# Patient Record
Sex: FEMALE | Race: WHITE | NOT HISPANIC OR LATINO
[De-identification: names, ages, dates, MRNs, and addresses within clinical notes are randomized per-mention and may not be internally consistent; named-entity substitution may affect disease eponyms.]

---

## 2017-02-03 ENCOUNTER — APPOINTMENT (OUTPATIENT)
Dept: OBGYN | Facility: CLINIC | Age: 22
End: 2017-02-03

## 2017-02-03 VITALS
DIASTOLIC BLOOD PRESSURE: 85 MMHG | HEIGHT: 66 IN | SYSTOLIC BLOOD PRESSURE: 120 MMHG | BODY MASS INDEX: 27.32 KG/M2 | WEIGHT: 170 LBS

## 2017-02-06 LAB
BACTERIA GENITAL AEROBE CULT: ABNORMAL
C TRACH DNA SPEC QL NAA+PROBE: NORMAL
C TRACH RRNA SPEC QL NAA+PROBE: NORMAL
N GONORRHOEA DNA SPEC QL NAA+PROBE: NORMAL
N GONORRHOEA RRNA SPEC QL NAA+PROBE: NORMAL
SOURCE AMPLIFICATION: NORMAL
SOURCE AMPLIFICATION: NORMAL
T VAGINALIS RRNA SPEC QL NAA+PROBE: NEGATIVE

## 2017-03-17 ENCOUNTER — LABORATORY RESULT (OUTPATIENT)
Age: 22
End: 2017-03-17

## 2017-03-17 ENCOUNTER — RESULT CHARGE (OUTPATIENT)
Age: 22
End: 2017-03-17

## 2017-03-17 ENCOUNTER — APPOINTMENT (OUTPATIENT)
Dept: OBGYN | Facility: CLINIC | Age: 22
End: 2017-03-17

## 2017-03-17 VITALS — SYSTOLIC BLOOD PRESSURE: 120 MMHG | DIASTOLIC BLOOD PRESSURE: 80 MMHG

## 2017-03-17 LAB
BILIRUB UR QL STRIP: NORMAL
GLUCOSE UR-MCNC: NORMAL
HCG UR QL: 1 EU/DL
HGB UR QL STRIP.AUTO: NORMAL
KETONES UR-MCNC: NORMAL
LEUKOCYTE ESTERASE UR QL STRIP: NORMAL
NITRITE UR QL STRIP: NORMAL
PH UR STRIP: 7
PROT UR STRIP-MCNC: NORMAL
SP GR UR STRIP: 1.02

## 2017-03-18 LAB
APPEARANCE: ABNORMAL
BILIRUBIN URINE: ABNORMAL
BLOOD URINE: NEGATIVE
COLOR: ABNORMAL
GLUCOSE QUALITATIVE U: NORMAL MG/DL
KETONES URINE: NEGATIVE
LEUKOCYTE ESTERASE URINE: ABNORMAL
NITRITE URINE: POSITIVE
PH URINE: 6.5
PROTEIN URINE: NEGATIVE MG/DL
SPECIFIC GRAVITY URINE: 1.03
UROBILINOGEN URINE: 1 MG/DL

## 2017-03-20 LAB
BACTERIA GENITAL AEROBE CULT: ABNORMAL
BACTERIA UR CULT: ABNORMAL

## 2017-07-27 ENCOUNTER — RX RENEWAL (OUTPATIENT)
Age: 22
End: 2017-07-27

## 2017-07-27 ENCOUNTER — MEDICATION RENEWAL (OUTPATIENT)
Age: 22
End: 2017-07-27

## 2017-08-21 ENCOUNTER — APPOINTMENT (OUTPATIENT)
Dept: OBGYN | Facility: CLINIC | Age: 22
End: 2017-08-21
Payer: MEDICAID

## 2017-08-21 VITALS
WEIGHT: 170 LBS | BODY MASS INDEX: 27.32 KG/M2 | DIASTOLIC BLOOD PRESSURE: 60 MMHG | HEIGHT: 66 IN | SYSTOLIC BLOOD PRESSURE: 110 MMHG

## 2017-08-21 PROCEDURE — 99395 PREV VISIT EST AGE 18-39: CPT

## 2017-08-24 LAB
C TRACH RRNA SPEC QL NAA+PROBE: NORMAL
CYTOLOGY CVX/VAG DOC THIN PREP: NORMAL
N GONORRHOEA RRNA SPEC QL NAA+PROBE: NORMAL
SOURCE TP AMPLIFICATION: NORMAL

## 2017-08-29 ENCOUNTER — MEDICATION RENEWAL (OUTPATIENT)
Age: 22
End: 2017-08-29

## 2017-09-14 ENCOUNTER — MEDICATION RENEWAL (OUTPATIENT)
Age: 22
End: 2017-09-14

## 2018-02-09 ENCOUNTER — APPOINTMENT (OUTPATIENT)
Dept: OBGYN | Facility: CLINIC | Age: 23
End: 2018-02-09
Payer: MEDICAID

## 2018-02-09 VITALS
BODY MASS INDEX: 27.62 KG/M2 | DIASTOLIC BLOOD PRESSURE: 70 MMHG | SYSTOLIC BLOOD PRESSURE: 110 MMHG | WEIGHT: 176 LBS | HEIGHT: 67 IN

## 2018-02-09 PROCEDURE — 99213 OFFICE O/P EST LOW 20 MIN: CPT

## 2018-02-09 RX ORDER — SULFAMETHOXAZOLE AND TRIMETHOPRIM 800; 160 MG/1; MG/1
800-160 TABLET ORAL TWICE DAILY
Qty: 14 | Refills: 0 | Status: COMPLETED | COMMUNITY
Start: 2017-09-14 | End: 2018-02-09

## 2018-02-09 RX ORDER — VENLAFAXINE HYDROCHLORIDE 37.5 MG/1
37.5 CAPSULE, EXTENDED RELEASE ORAL
Qty: 30 | Refills: 0 | Status: COMPLETED | COMMUNITY
Start: 2017-08-21

## 2018-02-09 RX ORDER — TOPIRAMATE 25 MG/1
25 TABLET, FILM COATED ORAL
Qty: 120 | Refills: 0 | Status: COMPLETED | COMMUNITY
Start: 2017-08-21

## 2018-02-09 RX ORDER — METRONIDAZOLE 7.5 MG/G
0.75 GEL VAGINAL
Qty: 1 | Refills: 0 | Status: COMPLETED | COMMUNITY
Start: 2017-02-03 | End: 2018-02-09

## 2018-02-09 RX ORDER — VENLAFAXINE HYDROCHLORIDE 150 MG/1
150 CAPSULE, EXTENDED RELEASE ORAL
Qty: 30 | Refills: 0 | Status: COMPLETED | COMMUNITY
Start: 2017-10-16

## 2018-02-09 RX ORDER — METHYLPHENIDATE HYDROCHLORIDE 27 MG/1
27 TABLET, EXTENDED RELEASE ORAL
Qty: 30 | Refills: 0 | Status: COMPLETED | COMMUNITY
Start: 2017-08-21

## 2018-02-09 RX ORDER — METHYLPHENIDATE HYDROCHLORIDE 20 MG/1
20 TABLET ORAL
Qty: 60 | Refills: 0 | Status: COMPLETED | COMMUNITY
Start: 2017-08-21

## 2018-02-09 RX ORDER — CLINDAMYCIN PHOSPHATE 20 MG/G
2 CREAM VAGINAL
Qty: 1 | Refills: 0 | Status: COMPLETED | COMMUNITY
Start: 2017-03-17 | End: 2018-02-09

## 2018-02-12 ENCOUNTER — RX RENEWAL (OUTPATIENT)
Age: 23
End: 2018-02-12

## 2018-07-25 ENCOUNTER — RX RENEWAL (OUTPATIENT)
Age: 23
End: 2018-07-25

## 2018-08-09 ENCOUNTER — MEDICATION RENEWAL (OUTPATIENT)
Age: 23
End: 2018-08-09

## 2018-08-27 ENCOUNTER — LABORATORY RESULT (OUTPATIENT)
Age: 23
End: 2018-08-27

## 2018-08-27 ENCOUNTER — APPOINTMENT (OUTPATIENT)
Dept: OBGYN | Facility: CLINIC | Age: 23
End: 2018-08-27
Payer: MEDICAID

## 2018-08-27 VITALS
HEIGHT: 67 IN | SYSTOLIC BLOOD PRESSURE: 130 MMHG | DIASTOLIC BLOOD PRESSURE: 70 MMHG | WEIGHT: 172 LBS | BODY MASS INDEX: 27 KG/M2

## 2018-08-27 PROCEDURE — 99395 PREV VISIT EST AGE 18-39: CPT

## 2018-08-28 LAB
C TRACH RRNA SPEC QL NAA+PROBE: NOT DETECTED
N GONORRHOEA RRNA SPEC QL NAA+PROBE: NOT DETECTED
SOURCE TP AMPLIFICATION: NORMAL

## 2018-09-06 LAB — CYTOLOGY CVX/VAG DOC THIN PREP: NORMAL

## 2018-11-13 ENCOUNTER — RX RENEWAL (OUTPATIENT)
Age: 23
End: 2018-11-13

## 2018-12-18 ENCOUNTER — APPOINTMENT (OUTPATIENT)
Dept: OBGYN | Facility: CLINIC | Age: 23
End: 2018-12-18
Payer: MEDICAID

## 2018-12-18 VITALS — DIASTOLIC BLOOD PRESSURE: 80 MMHG | SYSTOLIC BLOOD PRESSURE: 110 MMHG

## 2018-12-18 PROCEDURE — 99213 OFFICE O/P EST LOW 20 MIN: CPT

## 2018-12-19 ENCOUNTER — TRANSCRIPTION ENCOUNTER (OUTPATIENT)
Age: 23
End: 2018-12-19

## 2019-01-23 ENCOUNTER — MEDICATION RENEWAL (OUTPATIENT)
Age: 24
End: 2019-01-23

## 2019-02-01 ENCOUNTER — APPOINTMENT (OUTPATIENT)
Dept: OBGYN | Facility: CLINIC | Age: 24
End: 2019-02-01
Payer: MEDICAID

## 2019-02-01 VITALS
BODY MASS INDEX: 27.32 KG/M2 | HEIGHT: 66 IN | WEIGHT: 170 LBS | SYSTOLIC BLOOD PRESSURE: 100 MMHG | DIASTOLIC BLOOD PRESSURE: 60 MMHG

## 2019-02-01 PROCEDURE — 99213 OFFICE O/P EST LOW 20 MIN: CPT

## 2019-02-01 RX ORDER — CEPHALEXIN 500 MG/1
500 CAPSULE ORAL
Qty: 14 | Refills: 0 | Status: COMPLETED | COMMUNITY
Start: 2018-12-19

## 2019-02-01 RX ORDER — ESCITALOPRAM OXALATE 10 MG/1
10 TABLET ORAL
Qty: 30 | Refills: 0 | Status: COMPLETED | COMMUNITY
Start: 2018-03-07

## 2019-02-01 RX ORDER — DESVENLAFAXINE 25 MG/1
25 TABLET, EXTENDED RELEASE ORAL
Qty: 30 | Refills: 0 | Status: COMPLETED | COMMUNITY
Start: 2018-10-29

## 2019-02-01 NOTE — HISTORY OF PRESENT ILLNESS
[6 Months Ago] : 6 months ago [Healthy Diet] : a healthy diet [Regular Exercise] : regular exercise [Weight Concerns] : weight concens [Sexually Active] : is sexually active [Monogamous] : is monogamous [Male ___] : [unfilled] male

## 2019-02-18 ENCOUNTER — RX RENEWAL (OUTPATIENT)
Age: 24
End: 2019-02-18

## 2019-04-03 ENCOUNTER — TRANSCRIPTION ENCOUNTER (OUTPATIENT)
Age: 24
End: 2019-04-03

## 2019-06-10 ENCOUNTER — TRANSCRIPTION ENCOUNTER (OUTPATIENT)
Age: 24
End: 2019-06-10

## 2019-07-01 ENCOUNTER — OUTPATIENT (OUTPATIENT)
Dept: OUTPATIENT SERVICES | Facility: HOSPITAL | Age: 24
LOS: 1 days | End: 2019-07-01
Payer: MEDICAID

## 2019-07-01 PROCEDURE — G9001: CPT

## 2019-07-16 DIAGNOSIS — Z71.89 OTHER SPECIFIED COUNSELING: ICD-10-CM

## 2019-08-29 ENCOUNTER — APPOINTMENT (OUTPATIENT)
Dept: OBGYN | Facility: CLINIC | Age: 24
End: 2019-08-29
Payer: MEDICAID

## 2019-08-29 VITALS
BODY MASS INDEX: 27.99 KG/M2 | DIASTOLIC BLOOD PRESSURE: 80 MMHG | WEIGHT: 174.17 LBS | SYSTOLIC BLOOD PRESSURE: 120 MMHG | HEIGHT: 66 IN

## 2019-08-29 PROCEDURE — 99395 PREV VISIT EST AGE 18-39: CPT

## 2019-08-29 RX ORDER — VENLAFAXINE HYDROCHLORIDE 75 MG/1
75 CAPSULE, EXTENDED RELEASE ORAL
Qty: 30 | Refills: 0 | Status: DISCONTINUED | COMMUNITY
Start: 2017-08-21 | End: 2019-08-29

## 2019-08-29 RX ORDER — CIPROFLOXACIN 3 MG/ML
0.3 SOLUTION OPHTHALMIC
Qty: 5 | Refills: 0 | Status: COMPLETED | COMMUNITY
Start: 2019-04-03

## 2019-08-29 RX ORDER — CLONAZEPAM 1 MG/1
1 TABLET ORAL
Qty: 60 | Refills: 0 | Status: DISCONTINUED | COMMUNITY
Start: 2017-08-21 | End: 2019-08-29

## 2019-08-29 RX ORDER — TRAZODONE HYDROCHLORIDE 150 MG/1
150 TABLET ORAL
Qty: 30 | Refills: 0 | Status: DISCONTINUED | COMMUNITY
Start: 2018-08-03 | End: 2019-08-29

## 2019-08-29 RX ORDER — LISDEXAMFETAMINE DIMESYLATE 30 MG/1
30 CAPSULE ORAL
Qty: 30 | Refills: 0 | Status: DISCONTINUED | COMMUNITY
Start: 2017-10-16 | End: 2019-08-29

## 2019-08-29 NOTE — PHYSICAL EXAM
[Awake] : awake [Alert] : alert [Acute Distress] : no acute distress [Thyroid Nodule] : no thyroid nodule [LAD] : no lymphadenopathy [Mass] : no breast mass [Goiter] : no goiter [Nipple Discharge] : no nipple discharge [Soft] : soft [Axillary LAD] : no axillary lymphadenopathy [Tender] : non tender [Distended] : not distended [H/Smegaly] : no hepatosplenomegaly [Oriented x3] : oriented to person, place, and time [Labia Majora] : labia major [Labia Minora] : labia minora [Normal] : clitoris [Pap Obtained] : a Pap smear was performed [No Bleeding] : there was no active vaginal bleeding [Uterine Adnexae] : were not tender and not enlarged

## 2019-08-29 NOTE — COUNSELING
[Nutrition] : nutrition [Breast Self Exam] : breast self exam [Vitamins/Supplements] : vitamins/supplements [Exercise] : exercise [Contraception] : contraception

## 2019-10-04 ENCOUNTER — OUTPATIENT (OUTPATIENT)
Dept: OUTPATIENT SERVICES | Facility: HOSPITAL | Age: 24
LOS: 1 days | End: 2019-10-04
Payer: MEDICAID

## 2019-10-04 VITALS
HEIGHT: 66.5 IN | RESPIRATION RATE: 16 BRPM | TEMPERATURE: 98 F | OXYGEN SATURATION: 100 % | WEIGHT: 171.96 LBS | SYSTOLIC BLOOD PRESSURE: 126 MMHG | DIASTOLIC BLOOD PRESSURE: 79 MMHG | HEART RATE: 78 BPM

## 2019-10-04 DIAGNOSIS — K21.9 GASTRO-ESOPHAGEAL REFLUX DISEASE WITHOUT ESOPHAGITIS: ICD-10-CM

## 2019-10-04 DIAGNOSIS — Z90.3 ACQUIRED ABSENCE OF STOMACH [PART OF]: Chronic | ICD-10-CM

## 2019-10-04 DIAGNOSIS — Z98.890 OTHER SPECIFIED POSTPROCEDURAL STATES: Chronic | ICD-10-CM

## 2019-10-04 DIAGNOSIS — E66.01 MORBID (SEVERE) OBESITY DUE TO EXCESS CALORIES: ICD-10-CM

## 2019-10-04 DIAGNOSIS — Z01.818 ENCOUNTER FOR OTHER PREPROCEDURAL EXAMINATION: ICD-10-CM

## 2019-10-04 LAB
ANION GAP SERPL CALC-SCNC: 7 MMOL/L — SIGNIFICANT CHANGE UP (ref 5–17)
APTT BLD: 27.6 SEC — SIGNIFICANT CHANGE UP (ref 27.5–36.3)
BASOPHILS # BLD AUTO: 0.08 K/UL — SIGNIFICANT CHANGE UP (ref 0–0.2)
BASOPHILS NFR BLD AUTO: 1.7 % — SIGNIFICANT CHANGE UP (ref 0–2)
BUN SERPL-MCNC: 16 MG/DL — SIGNIFICANT CHANGE UP (ref 7–23)
CALCIUM SERPL-MCNC: 9.1 MG/DL — SIGNIFICANT CHANGE UP (ref 8.5–10.1)
CHLORIDE SERPL-SCNC: 107 MMOL/L — SIGNIFICANT CHANGE UP (ref 96–108)
CO2 SERPL-SCNC: 27 MMOL/L — SIGNIFICANT CHANGE UP (ref 22–31)
CREAT SERPL-MCNC: 0.88 MG/DL — SIGNIFICANT CHANGE UP (ref 0.5–1.3)
EOSINOPHIL # BLD AUTO: 0.2 K/UL — SIGNIFICANT CHANGE UP (ref 0–0.5)
EOSINOPHIL NFR BLD AUTO: 4.3 % — SIGNIFICANT CHANGE UP (ref 0–6)
GLUCOSE SERPL-MCNC: 81 MG/DL — SIGNIFICANT CHANGE UP (ref 70–99)
HCT VFR BLD CALC: 33.6 % — LOW (ref 34.5–45)
HGB BLD-MCNC: 10.1 G/DL — LOW (ref 11.5–15.5)
IMM GRANULOCYTES NFR BLD AUTO: 0.4 % — SIGNIFICANT CHANGE UP (ref 0–1.5)
INR BLD: 0.86 RATIO — LOW (ref 0.88–1.16)
LYMPHOCYTES # BLD AUTO: 1.67 K/UL — SIGNIFICANT CHANGE UP (ref 1–3.3)
LYMPHOCYTES # BLD AUTO: 36.1 % — SIGNIFICANT CHANGE UP (ref 13–44)
MCHC RBC-ENTMCNC: 22.8 PG — LOW (ref 27–34)
MCHC RBC-ENTMCNC: 30.1 GM/DL — LOW (ref 32–36)
MCV RBC AUTO: 75.8 FL — LOW (ref 80–100)
MONOCYTES # BLD AUTO: 0.55 K/UL — SIGNIFICANT CHANGE UP (ref 0–0.9)
MONOCYTES NFR BLD AUTO: 11.9 % — SIGNIFICANT CHANGE UP (ref 2–14)
NEUTROPHILS # BLD AUTO: 2.1 K/UL — SIGNIFICANT CHANGE UP (ref 1.8–7.4)
NEUTROPHILS NFR BLD AUTO: 45.6 % — SIGNIFICANT CHANGE UP (ref 43–77)
PLATELET # BLD AUTO: 288 K/UL — SIGNIFICANT CHANGE UP (ref 150–400)
POTASSIUM SERPL-MCNC: 4.2 MMOL/L — SIGNIFICANT CHANGE UP (ref 3.5–5.3)
POTASSIUM SERPL-SCNC: 4.2 MMOL/L — SIGNIFICANT CHANGE UP (ref 3.5–5.3)
PROTHROM AB SERPL-ACNC: 9.5 SEC — LOW (ref 10–12.9)
RBC # BLD: 4.43 M/UL — SIGNIFICANT CHANGE UP (ref 3.8–5.2)
RBC # FLD: 16.8 % — HIGH (ref 10.3–14.5)
SODIUM SERPL-SCNC: 141 MMOL/L — SIGNIFICANT CHANGE UP (ref 135–145)
WBC # BLD: 4.62 K/UL — SIGNIFICANT CHANGE UP (ref 3.8–10.5)
WBC # FLD AUTO: 4.62 K/UL — SIGNIFICANT CHANGE UP (ref 3.8–10.5)

## 2019-10-04 PROCEDURE — 85730 THROMBOPLASTIN TIME PARTIAL: CPT

## 2019-10-04 PROCEDURE — 80048 BASIC METABOLIC PNL TOTAL CA: CPT

## 2019-10-04 PROCEDURE — 85610 PROTHROMBIN TIME: CPT

## 2019-10-04 PROCEDURE — G0463: CPT | Mod: 25

## 2019-10-04 PROCEDURE — 85025 COMPLETE CBC W/AUTO DIFF WBC: CPT

## 2019-10-04 PROCEDURE — 36415 COLL VENOUS BLD VENIPUNCTURE: CPT

## 2019-10-04 NOTE — H&P PST ADULT - ASSESSMENT
24 years old female present to PST prior  to upper endoscopy with Dr. Edwards.    Plan  1. Expect a call from Endoscopy the day before your procedure between 11am and 3pm.  2. Follow the GI doctor's instructions for preparation  and day before procedure activities.  3. Follow the GI doctor's  instructions for medications.

## 2019-10-04 NOTE — H&P PST ADULT - NSICDXPASTMEDICALHX_GEN_ALL_CORE_FT
PAST MEDICAL HISTORY:  Anxiety and depression     Chronic GERD     Constipation     History of anemia     History of asthma as a child    History of obesity     Migraines

## 2019-10-04 NOTE — H&P PST ADULT - NSICDXFAMILYHX_GEN_ALL_CORE_FT
FAMILY HISTORY:  Family history of anxiety disorder, mother  Family history of depression, mother  Family history of hyperlipidemia, father

## 2019-10-04 NOTE — H&P PST ADULT - NSICDXPASTSURGICALHX_GEN_ALL_CORE_FT
PAST SURGICAL HISTORY:  History of augmentation of both breasts 8/ 2014    History of sleeve gastrectomy 5/ 2012

## 2019-10-05 DIAGNOSIS — K21.9 GASTRO-ESOPHAGEAL REFLUX DISEASE WITHOUT ESOPHAGITIS: ICD-10-CM

## 2019-10-05 DIAGNOSIS — E66.01 MORBID (SEVERE) OBESITY DUE TO EXCESS CALORIES: ICD-10-CM

## 2019-10-05 DIAGNOSIS — Z01.818 ENCOUNTER FOR OTHER PREPROCEDURAL EXAMINATION: ICD-10-CM

## 2019-10-11 ENCOUNTER — OUTPATIENT (OUTPATIENT)
Dept: OUTPATIENT SERVICES | Facility: HOSPITAL | Age: 24
LOS: 1 days | Discharge: HOME | End: 2019-10-11
Payer: MEDICAID

## 2019-10-11 ENCOUNTER — RESULT REVIEW (OUTPATIENT)
Age: 24
End: 2019-10-11

## 2019-10-11 VITALS
TEMPERATURE: 97 F | SYSTOLIC BLOOD PRESSURE: 104 MMHG | WEIGHT: 169.98 LBS | HEIGHT: 65 IN | OXYGEN SATURATION: 100 % | DIASTOLIC BLOOD PRESSURE: 64 MMHG | HEART RATE: 73 BPM | RESPIRATION RATE: 17 BRPM

## 2019-10-11 DIAGNOSIS — Z98.890 OTHER SPECIFIED POSTPROCEDURAL STATES: Chronic | ICD-10-CM

## 2019-10-11 DIAGNOSIS — Z90.3 ACQUIRED ABSENCE OF STOMACH [PART OF]: Chronic | ICD-10-CM

## 2019-10-11 DIAGNOSIS — K21.9 GASTRO-ESOPHAGEAL REFLUX DISEASE WITHOUT ESOPHAGITIS: ICD-10-CM

## 2019-10-11 DIAGNOSIS — E66.01 MORBID (SEVERE) OBESITY DUE TO EXCESS CALORIES: ICD-10-CM

## 2019-10-11 LAB — HCG UR QL: NEGATIVE — SIGNIFICANT CHANGE UP

## 2019-10-11 PROCEDURE — 88313 SPECIAL STAINS GROUP 2: CPT

## 2019-10-11 PROCEDURE — 88313 SPECIAL STAINS GROUP 2: CPT | Mod: 26

## 2019-10-11 PROCEDURE — 88305 TISSUE EXAM BY PATHOLOGIST: CPT

## 2019-10-11 PROCEDURE — 88305 TISSUE EXAM BY PATHOLOGIST: CPT | Mod: 26

## 2019-10-11 PROCEDURE — 88312 SPECIAL STAINS GROUP 1: CPT | Mod: 26

## 2019-10-11 PROCEDURE — 88312 SPECIAL STAINS GROUP 1: CPT

## 2019-10-11 PROCEDURE — 81025 URINE PREGNANCY TEST: CPT

## 2019-10-11 NOTE — ASU PATIENT PROFILE, ADULT - PMH
Anxiety and depression    Chronic GERD    Constipation    History of anemia    History of asthma  as a child  History of obesity    Migraines

## 2019-10-17 DIAGNOSIS — K21.9 GASTRO-ESOPHAGEAL REFLUX DISEASE WITHOUT ESOPHAGITIS: ICD-10-CM

## 2019-10-17 DIAGNOSIS — Z98.84 BARIATRIC SURGERY STATUS: ICD-10-CM

## 2019-10-17 DIAGNOSIS — F32.9 MAJOR DEPRESSIVE DISORDER, SINGLE EPISODE, UNSPECIFIED: ICD-10-CM

## 2019-10-17 DIAGNOSIS — E66.9 OBESITY, UNSPECIFIED: ICD-10-CM

## 2019-10-17 DIAGNOSIS — Z88.0 ALLERGY STATUS TO PENICILLIN: ICD-10-CM

## 2019-10-17 DIAGNOSIS — K21.0 GASTRO-ESOPHAGEAL REFLUX DISEASE WITH ESOPHAGITIS: ICD-10-CM

## 2019-10-17 DIAGNOSIS — F41.9 ANXIETY DISORDER, UNSPECIFIED: ICD-10-CM

## 2019-10-17 DIAGNOSIS — K29.50 UNSPECIFIED CHRONIC GASTRITIS WITHOUT BLEEDING: ICD-10-CM

## 2019-10-17 DIAGNOSIS — J45.909 UNSPECIFIED ASTHMA, UNCOMPLICATED: ICD-10-CM

## 2019-10-22 PROBLEM — K21.9 GASTRO-ESOPHAGEAL REFLUX DISEASE WITHOUT ESOPHAGITIS: Chronic | Status: ACTIVE | Noted: 2019-10-04

## 2019-10-22 PROBLEM — Z86.39 PERSONAL HISTORY OF OTHER ENDOCRINE, NUTRITIONAL AND METABOLIC DISEASE: Chronic | Status: ACTIVE | Noted: 2019-10-04

## 2019-10-22 PROBLEM — Z87.09 PERSONAL HISTORY OF OTHER DISEASES OF THE RESPIRATORY SYSTEM: Chronic | Status: ACTIVE | Noted: 2019-10-04

## 2019-10-22 PROBLEM — Z86.2 PERSONAL HISTORY OF DISEASES OF THE BLOOD AND BLOOD-FORMING ORGANS AND CERTAIN DISORDERS INVOLVING THE IMMUNE MECHANISM: Chronic | Status: ACTIVE | Noted: 2019-10-04

## 2019-10-22 PROBLEM — F41.9 ANXIETY DISORDER, UNSPECIFIED: Chronic | Status: ACTIVE | Noted: 2019-10-04

## 2019-10-22 PROBLEM — G43.909 MIGRAINE, UNSPECIFIED, NOT INTRACTABLE, WITHOUT STATUS MIGRAINOSUS: Chronic | Status: ACTIVE | Noted: 2019-10-04

## 2019-10-22 PROBLEM — K59.00 CONSTIPATION, UNSPECIFIED: Chronic | Status: ACTIVE | Noted: 2019-10-04

## 2019-10-28 ENCOUNTER — TRANSCRIPTION ENCOUNTER (OUTPATIENT)
Age: 24
End: 2019-10-28

## 2019-10-28 ENCOUNTER — RESULT CHARGE (OUTPATIENT)
Age: 24
End: 2019-10-28

## 2019-10-28 ENCOUNTER — APPOINTMENT (OUTPATIENT)
Dept: OBGYN | Facility: CLINIC | Age: 24
End: 2019-10-28
Payer: MEDICAID

## 2019-10-28 LAB
BILIRUB UR QL STRIP: NEGATIVE
CLARITY UR: CLEAR
COLLECTION METHOD: NORMAL
GLUCOSE UR-MCNC: NEGATIVE
HCG UR QL: 0.2 EU/DL
HGB UR QL STRIP.AUTO: NEGATIVE
KETONES UR-MCNC: NEGATIVE
LEUKOCYTE ESTERASE UR QL STRIP: NEGATIVE
NITRITE UR QL STRIP: NEGATIVE
PH UR STRIP: 8.5
PROT UR STRIP-MCNC: NEGATIVE
SP GR UR STRIP: 1.01

## 2019-10-28 PROCEDURE — 99214 OFFICE O/P EST MOD 30 MIN: CPT

## 2019-10-28 RX ORDER — NORGESTIMATE AND ETHINYL ESTRADIOL 0.25-0.035
0.25-35 KIT ORAL
Qty: 84 | Refills: 1 | Status: COMPLETED | COMMUNITY
Start: 2017-08-21 | End: 2019-10-28

## 2019-10-28 NOTE — PHYSICAL EXAM
[Normal] : cervix [Labia Majora] : labia major [Scant] : there was scant vaginal bleeding [FreeTextEntry4] : discharge and blood.

## 2019-10-29 LAB
HBV SURFACE AG SER QL: NONREACTIVE
HCV AB SER QL: NONREACTIVE
HCV S/CO RATIO: 0.11 S/CO
HIV1+2 AB SPEC QL IA.RAPID: NONREACTIVE
T PALLIDUM AB SER QL IA: NEGATIVE

## 2019-11-01 ENCOUNTER — MEDICATION RENEWAL (OUTPATIENT)
Age: 24
End: 2019-11-01

## 2019-11-04 LAB
A VAGINAE DNA VAG QL NAA+PROBE: NORMAL
BVAB2 DNA VAG QL NAA+PROBE: NORMAL
C KRUSEI DNA VAG QL NAA+PROBE: NEGATIVE
C TRACH RRNA SPEC QL NAA+PROBE: NEGATIVE
MEGA1 DNA VAG QL NAA+PROBE: NORMAL
N GONORRHOEA RRNA SPEC QL NAA+PROBE: NEGATIVE
T VAGINALIS RRNA SPEC QL NAA+PROBE: NEGATIVE

## 2019-12-31 ENCOUNTER — OUTPATIENT (OUTPATIENT)
Dept: OUTPATIENT SERVICES | Facility: HOSPITAL | Age: 24
LOS: 1 days | End: 2019-12-31
Payer: MEDICAID

## 2019-12-31 VITALS
SYSTOLIC BLOOD PRESSURE: 112 MMHG | TEMPERATURE: 98 F | HEART RATE: 85 BPM | WEIGHT: 171.96 LBS | HEIGHT: 66 IN | DIASTOLIC BLOOD PRESSURE: 64 MMHG | OXYGEN SATURATION: 99 % | RESPIRATION RATE: 18 BRPM

## 2019-12-31 DIAGNOSIS — Z98.890 OTHER SPECIFIED POSTPROCEDURAL STATES: Chronic | ICD-10-CM

## 2019-12-31 DIAGNOSIS — Z01.818 ENCOUNTER FOR OTHER PREPROCEDURAL EXAMINATION: ICD-10-CM

## 2019-12-31 DIAGNOSIS — E66.01 MORBID (SEVERE) OBESITY DUE TO EXCESS CALORIES: ICD-10-CM

## 2019-12-31 DIAGNOSIS — Z90.3 ACQUIRED ABSENCE OF STOMACH [PART OF]: Chronic | ICD-10-CM

## 2019-12-31 DIAGNOSIS — Z29.9 ENCOUNTER FOR PROPHYLACTIC MEASURES, UNSPECIFIED: ICD-10-CM

## 2019-12-31 LAB
ALBUMIN SERPL ELPH-MCNC: 3.6 G/DL — SIGNIFICANT CHANGE UP (ref 3.3–5)
ANION GAP SERPL CALC-SCNC: 3 MMOL/L — LOW (ref 5–17)
APPEARANCE UR: ABNORMAL
APTT BLD: 28.3 SEC — SIGNIFICANT CHANGE UP (ref 27.5–36.3)
BASOPHILS # BLD AUTO: 0.05 K/UL — SIGNIFICANT CHANGE UP (ref 0–0.2)
BASOPHILS NFR BLD AUTO: 0.8 % — SIGNIFICANT CHANGE UP (ref 0–2)
BILIRUB UR-MCNC: ABNORMAL
BUN SERPL-MCNC: 10 MG/DL — SIGNIFICANT CHANGE UP (ref 7–23)
CALCIUM SERPL-MCNC: 9.1 MG/DL — SIGNIFICANT CHANGE UP (ref 8.5–10.1)
CHLORIDE SERPL-SCNC: 109 MMOL/L — HIGH (ref 96–108)
CO2 SERPL-SCNC: 27 MMOL/L — SIGNIFICANT CHANGE UP (ref 22–31)
COHGB MFR BLDV: 2.2 % — HIGH (ref 0–1.5)
COLOR SPEC: YELLOW — SIGNIFICANT CHANGE UP
CREAT SERPL-MCNC: 1.05 MG/DL — SIGNIFICANT CHANGE UP (ref 0.5–1.3)
DIFF PNL FLD: ABNORMAL
EOSINOPHIL # BLD AUTO: 0.28 K/UL — SIGNIFICANT CHANGE UP (ref 0–0.5)
EOSINOPHIL NFR BLD AUTO: 4.5 % — SIGNIFICANT CHANGE UP (ref 0–6)
GLUCOSE SERPL-MCNC: 96 MG/DL — SIGNIFICANT CHANGE UP (ref 70–99)
GLUCOSE UR QL: NEGATIVE MG/DL — SIGNIFICANT CHANGE UP
HCT VFR BLD CALC: 33.2 % — LOW (ref 34.5–45)
HGB BLD CALC-MCNC: 10.1 G/DL — LOW (ref 11.5–15.5)
HGB BLD-MCNC: 9.8 G/DL — LOW (ref 11.5–15.5)
IMM GRANULOCYTES NFR BLD AUTO: 0.3 % — SIGNIFICANT CHANGE UP (ref 0–1.5)
INR BLD: 0.97 RATIO — SIGNIFICANT CHANGE UP (ref 0.88–1.16)
KETONES UR-MCNC: ABNORMAL
LEUKOCYTE ESTERASE UR-ACNC: ABNORMAL
LYMPHOCYTES # BLD AUTO: 0.92 K/UL — LOW (ref 1–3.3)
LYMPHOCYTES # BLD AUTO: 14.7 % — SIGNIFICANT CHANGE UP (ref 13–44)
MCHC RBC-ENTMCNC: 22.3 PG — LOW (ref 27–34)
MCHC RBC-ENTMCNC: 29.5 GM/DL — LOW (ref 32–36)
MCV RBC AUTO: 75.6 FL — LOW (ref 80–100)
MONOCYTES # BLD AUTO: 0.46 K/UL — SIGNIFICANT CHANGE UP (ref 0–0.9)
MONOCYTES NFR BLD AUTO: 7.4 % — SIGNIFICANT CHANGE UP (ref 2–14)
NEUTROPHILS # BLD AUTO: 4.52 K/UL — SIGNIFICANT CHANGE UP (ref 1.8–7.4)
NEUTROPHILS NFR BLD AUTO: 72.3 % — SIGNIFICANT CHANGE UP (ref 43–77)
NITRITE UR-MCNC: NEGATIVE — SIGNIFICANT CHANGE UP
PH UR: 5 — SIGNIFICANT CHANGE UP (ref 5–8)
PLATELET # BLD AUTO: 247 K/UL — SIGNIFICANT CHANGE UP (ref 150–400)
POTASSIUM SERPL-MCNC: 4.6 MMOL/L — SIGNIFICANT CHANGE UP (ref 3.5–5.3)
POTASSIUM SERPL-SCNC: 4.6 MMOL/L — SIGNIFICANT CHANGE UP (ref 3.5–5.3)
PROT UR-MCNC: 30 MG/DL
PROTHROM AB SERPL-ACNC: 10.7 SEC — SIGNIFICANT CHANGE UP (ref 10–12.9)
RBC # BLD: 4.39 M/UL — SIGNIFICANT CHANGE UP (ref 3.8–5.2)
RBC # FLD: 18 % — HIGH (ref 10.3–14.5)
SODIUM SERPL-SCNC: 139 MMOL/L — SIGNIFICANT CHANGE UP (ref 135–145)
SP GR SPEC: 1.02 — SIGNIFICANT CHANGE UP (ref 1.01–1.02)
UROBILINOGEN FLD QL: 1 MG/DL
WBC # BLD: 6.25 K/UL — SIGNIFICANT CHANGE UP (ref 3.8–10.5)
WBC # FLD AUTO: 6.25 K/UL — SIGNIFICANT CHANGE UP (ref 3.8–10.5)

## 2019-12-31 PROCEDURE — 82375 ASSAY CARBOXYHB QUANT: CPT

## 2019-12-31 PROCEDURE — 81001 URINALYSIS AUTO W/SCOPE: CPT

## 2019-12-31 PROCEDURE — 85730 THROMBOPLASTIN TIME PARTIAL: CPT

## 2019-12-31 PROCEDURE — 93005 ELECTROCARDIOGRAM TRACING: CPT

## 2019-12-31 PROCEDURE — 82040 ASSAY OF SERUM ALBUMIN: CPT

## 2019-12-31 PROCEDURE — 71046 X-RAY EXAM CHEST 2 VIEWS: CPT

## 2019-12-31 PROCEDURE — 80048 BASIC METABOLIC PNL TOTAL CA: CPT

## 2019-12-31 PROCEDURE — 93010 ELECTROCARDIOGRAM REPORT: CPT

## 2019-12-31 PROCEDURE — 85610 PROTHROMBIN TIME: CPT

## 2019-12-31 PROCEDURE — G0463: CPT | Mod: 25

## 2019-12-31 PROCEDURE — 85025 COMPLETE CBC W/AUTO DIFF WBC: CPT

## 2019-12-31 PROCEDURE — 86901 BLOOD TYPING SEROLOGIC RH(D): CPT

## 2019-12-31 PROCEDURE — 86850 RBC ANTIBODY SCREEN: CPT

## 2019-12-31 PROCEDURE — 86900 BLOOD TYPING SEROLOGIC ABO: CPT

## 2019-12-31 PROCEDURE — 36415 COLL VENOUS BLD VENIPUNCTURE: CPT

## 2019-12-31 PROCEDURE — 71046 X-RAY EXAM CHEST 2 VIEWS: CPT | Mod: 26

## 2019-12-31 RX ORDER — NORGESTIMATE AND ETHINYL ESTRADIOL 7DAYSX3 LO
1 KIT ORAL
Qty: 0 | Refills: 0 | DISCHARGE

## 2019-12-31 RX ORDER — BUPROPION HYDROCHLORIDE 150 MG/1
4 TABLET, EXTENDED RELEASE ORAL
Qty: 0 | Refills: 0 | DISCHARGE

## 2019-12-31 NOTE — H&P PST ADULT - NSICDXPASTSURGICALHX_GEN_ALL_CORE_FT
PAST SURGICAL HISTORY:  History of augmentation of both breasts 8/ 2014    History of sleeve gastrectomy 5/ 2012    S/P endoscopy upper - 10/2019

## 2019-12-31 NOTE — H&P PST ADULT - ASSESSMENT
23 y/o female with history of morbid obesity, S/P Lap sleeve gastrectomy in , lost about 80lbs. Complain of chronic GERD, "It wakes me up at night", pt on Nexium. Scheduled for revision of bariatric surgery, for Laparoscopic gastric bypass.   Plan  1. Stop all NSAIDS, herbal supplements and vitamins for 7 days.  2. NPO at midnight.  3. Take the following medications ( Wellbutrin ) with small sips of water on the morning of your procedure/surgery.  4. Use EZ sponges as directed  5. Labs, EKG, CXR as per surgeon. STAT urine pregnancy on admission.   6. PMD visit for optimization prior to surgery as per surgeon    CAPRINI SCORE [CLOT]  AGE RELATED RISK FACTORS                                                       MOBILITY RELATED FACTORS  [ ] Age 41-60 years                                            (1 Point)                  [ ] Bed rest                                                        (1 Point)  [ ] Age: 61-74 years                                           (2 Points)                 [ ] Plaster cast                                                   (2 Points)  [ ] Age= 75 years                                              (3 Points)                 [ ] Bed bound for more than 72 hours                 (2 Points)    DISEASE RELATED RISK FACTORS                                               GENDER SPECIFIC FACTORS  [ ] Edema in the lower extremities                       (1 Point)                  [ ] Pregnancy                                                     (1 Point)  [ ] Varicose veins                                               (1 Point)                  [ ] Post-partum < 6 weeks                                   (1 Point)             [ x ] BMI > 25 Kg/m2                                            (1 Point)                  [ ] Hormonal therapy  or oral contraception          (1 Point)                 [ ] Sepsis (in the previous month)                        (1 Point)                  [ ] History of pregnancy complications                 (1 point)  [ ] Pneumonia or serious lung disease                                               [ ] Unexplained or recurrent                     (1 Point)           (in the previous month)                               (1 Point)  [ ] Abnormal pulmonary function test                     (1 Point)                 SURGERY RELATED RISK FACTORS  [ ] Acute myocardial infarction                              (1 Point)                 [ ]  Section                                             (1 Point)  [ ] Congestive heart failure (in the previous month)  (1 Point)               [ ] Minor surgery                                                  (1 Point)   [ ] Inflammatory bowel disease                             (1 Point)                 [ ] Arthroscopic surgery                                        (2 Points)  [ ] Central venous access                                      (2 Points)                [ x ] General surgery lasting more than 45 minutes   (2 Points)       [ ] Stroke (in the previous month)                          (5 Points)               [ ] Elective arthroplasty                                         (5 Points)     ()  malignancy                                                             (2 points )                                                                                                                                      HEMATOLOGY RELATED FACTORS                                                 TRAUMA RELATED RISK FACTORS  [ ] Prior episodes of VTE                                     (3 Points)                 [ ] Fracture of the hip, pelvis, or leg                       (5 Points)  [ ] Positive family history for VTE                         (3 Points)                 [ ] Acute spinal cord injury (in the previous month)  (5 Points)  [ ] Prothrombin 00218 A                                     (3 Points)                 [ ] Paralysis  (less than 1 month)                             (5 Points)  [ ] Factor V Leiden                                             (3 Points)                  [ ] Multiple Trauma within 1 month                        (5 Points)  [ ] Lupus anticoagulants                                     (3 Points)                                                           [ ] Anticardiolipin antibodies                               (3 Points)                                                       [ ] High homocysteine in the blood                      (3 Points)                                             [ ] Other congenital or acquired thrombophilia      (3 Points)                                                [ ] Heparin induced thrombocytopenia                  (3 Points)    (  ) Malignancy                                        Total Score [  3        ]  The Caprini score indicates this patient is at risk for a VTE event (score 3-5).  Most surgical patients in this group would benefit from pharmacologic prophylaxis.  The surgical team will determine the balance between VTE risk and bleeding risk

## 2019-12-31 NOTE — H&P PST ADULT - HISTORY OF PRESENT ILLNESS
25 y/o female with history of morbid obesity, S/P Lap sleeve gastrectomy in 2012, lost about 80lbs. Complain of chronic GERD, "It wakes me up at night", pt on Nexium. Scheduled for revision of bariatric surgery, for Laparoscopic gastric bypass.

## 2020-01-01 DIAGNOSIS — E66.01 MORBID (SEVERE) OBESITY DUE TO EXCESS CALORIES: ICD-10-CM

## 2020-01-01 DIAGNOSIS — Z01.818 ENCOUNTER FOR OTHER PREPROCEDURAL EXAMINATION: ICD-10-CM

## 2020-01-08 ENCOUNTER — INPATIENT (INPATIENT)
Facility: HOSPITAL | Age: 25
LOS: 1 days | Discharge: HOME | DRG: 222 | End: 2020-01-10
Attending: SURGERY | Admitting: SURGERY
Payer: MEDICAID

## 2020-01-08 VITALS
DIASTOLIC BLOOD PRESSURE: 66 MMHG | TEMPERATURE: 98 F | OXYGEN SATURATION: 98 % | WEIGHT: 169.98 LBS | RESPIRATION RATE: 15 BRPM | HEART RATE: 68 BPM | SYSTOLIC BLOOD PRESSURE: 112 MMHG | HEIGHT: 66 IN

## 2020-01-08 DIAGNOSIS — E66.01 MORBID (SEVERE) OBESITY DUE TO EXCESS CALORIES: ICD-10-CM

## 2020-01-08 DIAGNOSIS — Z90.3 ACQUIRED ABSENCE OF STOMACH [PART OF]: Chronic | ICD-10-CM

## 2020-01-08 DIAGNOSIS — Z98.890 OTHER SPECIFIED POSTPROCEDURAL STATES: Chronic | ICD-10-CM

## 2020-01-08 LAB
APPEARANCE UR: CLEAR — SIGNIFICANT CHANGE UP
BILIRUB UR-MCNC: NEGATIVE — SIGNIFICANT CHANGE UP
COLOR SPEC: YELLOW — SIGNIFICANT CHANGE UP
DIFF PNL FLD: ABNORMAL
GLUCOSE UR QL: NEGATIVE MG/DL — SIGNIFICANT CHANGE UP
HCG UR QL: NEGATIVE — SIGNIFICANT CHANGE UP
KETONES UR-MCNC: ABNORMAL
LEUKOCYTE ESTERASE UR-ACNC: ABNORMAL
NITRITE UR-MCNC: NEGATIVE — SIGNIFICANT CHANGE UP
PH UR: 7 — SIGNIFICANT CHANGE UP (ref 5–8)
PROT UR-MCNC: NEGATIVE MG/DL — SIGNIFICANT CHANGE UP
SP GR SPEC: 1 — LOW (ref 1.01–1.02)
UROBILINOGEN FLD QL: NEGATIVE MG/DL — SIGNIFICANT CHANGE UP

## 2020-01-08 PROCEDURE — 82962 GLUCOSE BLOOD TEST: CPT

## 2020-01-08 PROCEDURE — 85025 COMPLETE CBC W/AUTO DIFF WBC: CPT

## 2020-01-08 PROCEDURE — C9290: CPT

## 2020-01-08 PROCEDURE — C9113: CPT

## 2020-01-08 PROCEDURE — 80048 BASIC METABOLIC PNL TOTAL CA: CPT

## 2020-01-08 PROCEDURE — 85027 COMPLETE CBC AUTOMATED: CPT

## 2020-01-08 PROCEDURE — 81001 URINALYSIS AUTO W/SCOPE: CPT

## 2020-01-08 PROCEDURE — C1889: CPT

## 2020-01-08 PROCEDURE — 36415 COLL VENOUS BLD VENIPUNCTURE: CPT

## 2020-01-08 PROCEDURE — 74220 X-RAY XM ESOPHAGUS 1CNTRST: CPT

## 2020-01-08 PROCEDURE — 81025 URINE PREGNANCY TEST: CPT

## 2020-01-08 RX ORDER — APREPITANT 80 MG/1
80 CAPSULE ORAL ONCE
Refills: 0 | Status: COMPLETED | OUTPATIENT
Start: 2020-01-08 | End: 2020-01-08

## 2020-01-08 RX ORDER — GABAPENTIN 400 MG/1
300 CAPSULE ORAL
Refills: 0 | Status: DISCONTINUED | OUTPATIENT
Start: 2020-01-08 | End: 2020-01-10

## 2020-01-08 RX ORDER — ACETAMINOPHEN 500 MG
1000 TABLET ORAL EVERY 6 HOURS
Refills: 0 | Status: COMPLETED | OUTPATIENT
Start: 2020-01-08 | End: 2020-01-08

## 2020-01-08 RX ORDER — GABAPENTIN 400 MG/1
300 CAPSULE ORAL ONCE
Refills: 0 | Status: DISCONTINUED | OUTPATIENT
Start: 2020-01-08 | End: 2020-01-08

## 2020-01-08 RX ORDER — KETOROLAC TROMETHAMINE 30 MG/ML
30 SYRINGE (ML) INJECTION EVERY 6 HOURS
Refills: 0 | Status: DISCONTINUED | OUTPATIENT
Start: 2020-01-08 | End: 2020-01-09

## 2020-01-08 RX ORDER — HYDROMORPHONE HYDROCHLORIDE 2 MG/ML
0.5 INJECTION INTRAMUSCULAR; INTRAVENOUS; SUBCUTANEOUS
Refills: 0 | Status: DISCONTINUED | OUTPATIENT
Start: 2020-01-08 | End: 2020-01-08

## 2020-01-08 RX ORDER — HEPARIN SODIUM 5000 [USP'U]/ML
5000 INJECTION INTRAVENOUS; SUBCUTANEOUS ONCE
Refills: 0 | Status: COMPLETED | OUTPATIENT
Start: 2020-01-08 | End: 2020-01-08

## 2020-01-08 RX ORDER — ENOXAPARIN SODIUM 100 MG/ML
40 INJECTION SUBCUTANEOUS DAILY
Refills: 0 | Status: DISCONTINUED | OUTPATIENT
Start: 2020-01-08 | End: 2020-01-10

## 2020-01-08 RX ORDER — OXYCODONE HYDROCHLORIDE 5 MG/1
10 TABLET ORAL EVERY 4 HOURS
Refills: 0 | Status: DISCONTINUED | OUTPATIENT
Start: 2020-01-08 | End: 2020-01-10

## 2020-01-08 RX ORDER — SODIUM CHLORIDE 9 MG/ML
1000 INJECTION, SOLUTION INTRAVENOUS
Refills: 0 | Status: DISCONTINUED | OUTPATIENT
Start: 2020-01-08 | End: 2020-01-08

## 2020-01-08 RX ORDER — PANTOPRAZOLE SODIUM 20 MG/1
40 TABLET, DELAYED RELEASE ORAL EVERY 24 HOURS
Refills: 0 | Status: DISCONTINUED | OUTPATIENT
Start: 2020-01-08 | End: 2020-01-10

## 2020-01-08 RX ORDER — ONDANSETRON 8 MG/1
4 TABLET, FILM COATED ORAL ONCE
Refills: 0 | Status: DISCONTINUED | OUTPATIENT
Start: 2020-01-08 | End: 2020-01-08

## 2020-01-08 RX ORDER — SODIUM CHLORIDE 9 MG/ML
1000 INJECTION, SOLUTION INTRAVENOUS
Refills: 0 | Status: DISCONTINUED | OUTPATIENT
Start: 2020-01-08 | End: 2020-01-10

## 2020-01-08 RX ORDER — GABAPENTIN 400 MG/1
300 CAPSULE ORAL ONCE
Refills: 0 | Status: COMPLETED | OUTPATIENT
Start: 2020-01-08 | End: 2020-01-08

## 2020-01-08 RX ORDER — OXYCODONE HYDROCHLORIDE 5 MG/1
5 TABLET ORAL EVERY 4 HOURS
Refills: 0 | Status: DISCONTINUED | OUTPATIENT
Start: 2020-01-08 | End: 2020-01-10

## 2020-01-08 RX ADMIN — ENOXAPARIN SODIUM 40 MILLIGRAM(S): 100 INJECTION SUBCUTANEOUS at 23:31

## 2020-01-08 RX ADMIN — SODIUM CHLORIDE 150 MILLILITER(S): 9 INJECTION, SOLUTION INTRAVENOUS at 23:29

## 2020-01-08 RX ADMIN — Medication 30 MILLIGRAM(S): at 17:48

## 2020-01-08 RX ADMIN — HYDROMORPHONE HYDROCHLORIDE 0.5 MILLIGRAM(S): 2 INJECTION INTRAMUSCULAR; INTRAVENOUS; SUBCUTANEOUS at 17:30

## 2020-01-08 RX ADMIN — OXYCODONE HYDROCHLORIDE 10 MILLIGRAM(S): 5 TABLET ORAL at 22:10

## 2020-01-08 RX ADMIN — GABAPENTIN 300 MILLIGRAM(S): 400 CAPSULE ORAL at 23:31

## 2020-01-08 RX ADMIN — PANTOPRAZOLE SODIUM 40 MILLIGRAM(S): 20 TABLET, DELAYED RELEASE ORAL at 17:48

## 2020-01-08 RX ADMIN — Medication 400 MILLIGRAM(S): at 17:48

## 2020-01-08 RX ADMIN — GABAPENTIN 300 MILLIGRAM(S): 400 CAPSULE ORAL at 10:40

## 2020-01-08 RX ADMIN — SODIUM CHLORIDE 75 MILLILITER(S): 9 INJECTION, SOLUTION INTRAVENOUS at 17:49

## 2020-01-08 RX ADMIN — HEPARIN SODIUM 5000 UNIT(S): 5000 INJECTION INTRAVENOUS; SUBCUTANEOUS at 10:40

## 2020-01-08 RX ADMIN — APREPITANT 80 MILLIGRAM(S): 80 CAPSULE ORAL at 10:40

## 2020-01-08 RX ADMIN — OXYCODONE HYDROCHLORIDE 10 MILLIGRAM(S): 5 TABLET ORAL at 22:45

## 2020-01-08 NOTE — BRIEF OPERATIVE NOTE - NSICDXBRIEFPROCEDURE_GEN_ALL_CORE_FT
PROCEDURES:  Bilateral injection of anesthetic agent into tissue plane defined by transversus abdominis muscle 08-Jan-2020 17:35:17  Tod Machado  EGD 08-Jan-2020 17:35:13  Tod Machado  Laparoscopic conversion, sleeve gastrectomy to Brittany-en-Y gastric bypass 08-Jan-2020 17:35:08  Tod Machado

## 2020-01-08 NOTE — BRIEF OPERATIVE NOTE - OPERATION/FINDINGS
Patient underwent laparoscopic conversion of sleeve gastrectomy to gastric bypass (Brittany 150 & BP 80cm) without any complications. intraoperative EGD confirmed intact GJ anastomosis that was able to be transversed.

## 2020-01-09 DIAGNOSIS — K21.9 GASTRO-ESOPHAGEAL REFLUX DISEASE WITHOUT ESOPHAGITIS: ICD-10-CM

## 2020-01-09 LAB
ANION GAP SERPL CALC-SCNC: 4 MMOL/L — LOW (ref 5–17)
BASOPHILS # BLD AUTO: 0.01 K/UL — SIGNIFICANT CHANGE UP (ref 0–0.2)
BASOPHILS NFR BLD AUTO: 0.1 % — SIGNIFICANT CHANGE UP (ref 0–2)
BUN SERPL-MCNC: 5 MG/DL — LOW (ref 7–23)
CALCIUM SERPL-MCNC: 8.9 MG/DL — SIGNIFICANT CHANGE UP (ref 8.5–10.1)
CHLORIDE SERPL-SCNC: 107 MMOL/L — SIGNIFICANT CHANGE UP (ref 96–108)
CO2 SERPL-SCNC: 27 MMOL/L — SIGNIFICANT CHANGE UP (ref 22–31)
CREAT SERPL-MCNC: 0.7 MG/DL — SIGNIFICANT CHANGE UP (ref 0.5–1.3)
EOSINOPHIL # BLD AUTO: 0 K/UL — SIGNIFICANT CHANGE UP (ref 0–0.5)
EOSINOPHIL NFR BLD AUTO: 0 % — SIGNIFICANT CHANGE UP (ref 0–6)
GLUCOSE SERPL-MCNC: 112 MG/DL — HIGH (ref 70–99)
HCT VFR BLD CALC: 28.1 % — LOW (ref 34.5–45)
HGB BLD-MCNC: 8.7 G/DL — LOW (ref 11.5–15.5)
IMM GRANULOCYTES NFR BLD AUTO: 0.3 % — SIGNIFICANT CHANGE UP (ref 0–1.5)
LYMPHOCYTES # BLD AUTO: 0.97 K/UL — LOW (ref 1–3.3)
LYMPHOCYTES # BLD AUTO: 9.9 % — LOW (ref 13–44)
MCHC RBC-ENTMCNC: 22.9 PG — LOW (ref 27–34)
MCHC RBC-ENTMCNC: 31 GM/DL — LOW (ref 32–36)
MCV RBC AUTO: 73.9 FL — LOW (ref 80–100)
MONOCYTES # BLD AUTO: 0.96 K/UL — HIGH (ref 0–0.9)
MONOCYTES NFR BLD AUTO: 9.8 % — SIGNIFICANT CHANGE UP (ref 2–14)
NEUTROPHILS # BLD AUTO: 7.79 K/UL — HIGH (ref 1.8–7.4)
NEUTROPHILS NFR BLD AUTO: 79.9 % — HIGH (ref 43–77)
PLATELET # BLD AUTO: 230 K/UL — SIGNIFICANT CHANGE UP (ref 150–400)
POTASSIUM SERPL-MCNC: 4.3 MMOL/L — SIGNIFICANT CHANGE UP (ref 3.5–5.3)
POTASSIUM SERPL-SCNC: 4.3 MMOL/L — SIGNIFICANT CHANGE UP (ref 3.5–5.3)
RBC # BLD: 3.8 M/UL — SIGNIFICANT CHANGE UP (ref 3.8–5.2)
RBC # FLD: 17.4 % — HIGH (ref 10.3–14.5)
SODIUM SERPL-SCNC: 138 MMOL/L — SIGNIFICANT CHANGE UP (ref 135–145)
WBC # BLD: 9.76 K/UL — SIGNIFICANT CHANGE UP (ref 3.8–10.5)
WBC # FLD AUTO: 9.76 K/UL — SIGNIFICANT CHANGE UP (ref 3.8–10.5)

## 2020-01-09 PROCEDURE — 74220 X-RAY XM ESOPHAGUS 1CNTRST: CPT | Mod: 26

## 2020-01-09 RX ORDER — ACETAMINOPHEN 500 MG
1000 TABLET ORAL ONCE
Refills: 0 | Status: COMPLETED | OUTPATIENT
Start: 2020-01-09 | End: 2020-01-09

## 2020-01-09 RX ADMIN — OXYCODONE HYDROCHLORIDE 10 MILLIGRAM(S): 5 TABLET ORAL at 10:33

## 2020-01-09 RX ADMIN — GABAPENTIN 300 MILLIGRAM(S): 400 CAPSULE ORAL at 11:12

## 2020-01-09 RX ADMIN — PANTOPRAZOLE SODIUM 40 MILLIGRAM(S): 20 TABLET, DELAYED RELEASE ORAL at 17:56

## 2020-01-09 RX ADMIN — Medication 30 MILLIGRAM(S): at 06:07

## 2020-01-09 RX ADMIN — GABAPENTIN 300 MILLIGRAM(S): 400 CAPSULE ORAL at 21:05

## 2020-01-09 RX ADMIN — Medication 400 MILLIGRAM(S): at 08:45

## 2020-01-09 RX ADMIN — OXYCODONE HYDROCHLORIDE 10 MILLIGRAM(S): 5 TABLET ORAL at 14:51

## 2020-01-09 RX ADMIN — OXYCODONE HYDROCHLORIDE 10 MILLIGRAM(S): 5 TABLET ORAL at 11:12

## 2020-01-09 RX ADMIN — Medication 0.5 MILLIGRAM(S): at 22:00

## 2020-01-09 RX ADMIN — SODIUM CHLORIDE 150 MILLILITER(S): 9 INJECTION, SOLUTION INTRAVENOUS at 05:36

## 2020-01-09 RX ADMIN — OXYCODONE HYDROCHLORIDE 10 MILLIGRAM(S): 5 TABLET ORAL at 06:07

## 2020-01-09 RX ADMIN — OXYCODONE HYDROCHLORIDE 5 MILLIGRAM(S): 5 TABLET ORAL at 20:04

## 2020-01-09 RX ADMIN — ENOXAPARIN SODIUM 40 MILLIGRAM(S): 100 INJECTION SUBCUTANEOUS at 10:34

## 2020-01-09 RX ADMIN — OXYCODONE HYDROCHLORIDE 10 MILLIGRAM(S): 5 TABLET ORAL at 05:37

## 2020-01-09 RX ADMIN — Medication 30 MILLIGRAM(S): at 11:12

## 2020-01-09 RX ADMIN — Medication 30 MILLIGRAM(S): at 05:37

## 2020-01-09 RX ADMIN — Medication 1000 MILLIGRAM(S): at 02:05

## 2020-01-09 RX ADMIN — Medication 30 MILLIGRAM(S): at 00:07

## 2020-01-09 RX ADMIN — Medication 30 MILLIGRAM(S): at 10:33

## 2020-01-09 RX ADMIN — Medication 1000 MILLIGRAM(S): at 09:00

## 2020-01-09 RX ADMIN — Medication 400 MILLIGRAM(S): at 01:35

## 2020-01-09 RX ADMIN — Medication 30 MILLIGRAM(S): at 00:37

## 2020-01-09 NOTE — PROGRESS NOTE ADULT - PROBLEM SELECTOR PLAN 1
The patient is status post laparoscopic conversion of sleeve gastrectomy to gastric bypass and doing very well.    The patient will have an upper G.I. series this morning, if it shows no leak or stricture, will start gastric bypass clears.  Ambulation.  Pain control.  Incentive spirometer.

## 2020-01-09 NOTE — PROGRESS NOTE ADULT - SUBJECTIVE AND OBJECTIVE BOX
Post Op Day#: 1  Procedure:  Laparoscopic conversion of Sleeve Gastrectomy to gastric bypass    The patient is doing well without complaints.    Vital Signs Last 24 Hrs  T(C): 36.7 (09 Jan 2020 11:25), Max: 36.8 (08 Jan 2020 18:45)  T(F): 98.1 (09 Jan 2020 11:25), Max: 98.3 (08 Jan 2020 18:45)  HR: 61 (09 Jan 2020 11:25) (56 - 72)  BP: 114/70 (09 Jan 2020 11:25) (104/61 - 114/70)  BP(mean): --  RR: 16 (09 Jan 2020 11:25) (14 - 20)  SpO2: 100% (09 Jan 2020 11:25) (98% - 100%)    PHYSICAL EXAM:  General: NAD.  HEENT: no JVD, no jaundice.  LUNGS: CTAB.  Heart: S1 S2 RRR  Abd: soft nt/nd   Wounds: clean dry and intact                          8.7    9.76  )-----------( 230      ( 09 Jan 2020 06:35 )             28.1       01-09    138  |  107  |  5<L>  ----------------------------<  112<H>  4.3   |  27  |  0.70    Ca    8.9      09 Jan 2020 06:35

## 2020-01-10 ENCOUNTER — TRANSCRIPTION ENCOUNTER (OUTPATIENT)
Age: 25
End: 2020-01-10

## 2020-01-10 VITALS
HEART RATE: 85 BPM | TEMPERATURE: 98 F | OXYGEN SATURATION: 100 % | SYSTOLIC BLOOD PRESSURE: 132 MMHG | DIASTOLIC BLOOD PRESSURE: 87 MMHG | RESPIRATION RATE: 16 BRPM

## 2020-01-10 LAB
HCT VFR BLD CALC: 25.9 % — LOW (ref 34.5–45)
HGB BLD-MCNC: 7.9 G/DL — LOW (ref 11.5–15.5)
MCHC RBC-ENTMCNC: 23 PG — LOW (ref 27–34)
MCHC RBC-ENTMCNC: 30.5 GM/DL — LOW (ref 32–36)
MCV RBC AUTO: 75.5 FL — LOW (ref 80–100)
PLATELET # BLD AUTO: 203 K/UL — SIGNIFICANT CHANGE UP (ref 150–400)
RBC # BLD: 3.43 M/UL — LOW (ref 3.8–5.2)
RBC # FLD: 17.6 % — HIGH (ref 10.3–14.5)
WBC # BLD: 6.78 K/UL — SIGNIFICANT CHANGE UP (ref 3.8–10.5)
WBC # FLD AUTO: 6.78 K/UL — SIGNIFICANT CHANGE UP (ref 3.8–10.5)

## 2020-01-10 RX ADMIN — ENOXAPARIN SODIUM 40 MILLIGRAM(S): 100 INJECTION SUBCUTANEOUS at 11:21

## 2020-01-10 RX ADMIN — OXYCODONE HYDROCHLORIDE 10 MILLIGRAM(S): 5 TABLET ORAL at 08:43

## 2020-01-10 RX ADMIN — SODIUM CHLORIDE 150 MILLILITER(S): 9 INJECTION, SOLUTION INTRAVENOUS at 09:02

## 2020-01-10 RX ADMIN — OXYCODONE HYDROCHLORIDE 10 MILLIGRAM(S): 5 TABLET ORAL at 10:35

## 2020-01-10 RX ADMIN — GABAPENTIN 300 MILLIGRAM(S): 400 CAPSULE ORAL at 05:59

## 2020-01-10 NOTE — DISCHARGE NOTE PROVIDER - NSDCCPCAREPLAN_GEN_ALL_CORE_FT
PRINCIPAL DISCHARGE DIAGNOSIS  Diagnosis: Chronic GERD  Assessment and Plan of Treatment: Chronic GERD

## 2020-01-10 NOTE — DISCHARGE NOTE NURSING/CASE MANAGEMENT/SOCIAL WORK - PATIENT PORTAL LINK FT
You can access the FollowMyHealth Patient Portal offered by [facility] by registering at the following website: [email or web address]. By joining [employer]’s FollowMyHealth portal, you will also be able to view your health information using other applications (apps) compatible with our system.

## 2020-01-10 NOTE — DISCHARGE NOTE PROVIDER - CARE PROVIDER_API CALL
Kai Edwards)  Surgery  [address], [address]  [place] [address]  Phone: [phone]  Fax: [phone]  Follow Up Time:

## 2020-01-10 NOTE — PROGRESS NOTE ADULT - ASSESSMENT
s/p lap guerrero-en-y gastric bypass    doing very well.  f/u cbc this am  All discharge instructions were given to the patient, as well as potential signs of complications.  The patient will follow up in 2 weeks.  [facility]

## 2020-01-10 NOTE — PROGRESS NOTE ADULT - SUBJECTIVE AND OBJECTIVE BOX
Post Op Day#: 1  Procedure:  Laparoscopic Sleeve Gastrectomy    The patient is doing well without complaints.    Vital Signs Last 24 Hrs  T(C): 36.5 (10 Alexander 2020 05:59), Max: 36.9 (09 Jan 2020 20:42)  T(F): 97.7 (10 Alexander 2020 05:59), Max: 98.5 (09 Jan 2020 20:42)  HR: 73 (10 Alexander 2020 05:59) (61 - 77)  BP: 125/870 (10 Alexander 2020 05:59) (111/65 - 125/870)  BP(mean): --  RR: 16 (10 Alexander 2020 05:59) (16 - 16)  SpO2: 98% (10 Alexander 2020 05:59) (98% - 100%)    PHYSICAL EXAM:  General: NAD.  HEENT: no JVD, no jaundice.  LUNGS: CTAB.  Heart: S1 S2 RRR  Abd: soft nt/nd   Wounds: clean dry and intact                          8.7    9.76  )-----------( 230      ( 09 Jan 2020 06:35 )             28.1       01-09    138  |  107  |  5<L>  ----------------------------<  112<H>  4.3   |  27  |  0.70    Ca    8.9      09 Jan 2020 06:35

## 2020-01-10 NOTE — DISCHARGE NOTE PROVIDER - NSDCCPTREATMENT_GEN_ALL_CORE_FT
PRINCIPAL PROCEDURE  Procedure: Laparoscopic conversion, sleeve gastrectomy to Brittany-en-Y gastric bypass  Findings and Treatment:

## 2020-01-10 NOTE — DISCHARGE NOTE PROVIDER - NSDCMRMEDTOKEN_GEN_ALL_CORE_FT
clonazePAM 0.5 mg oral tablet: 1 tab(s) orally 3 times a day, As Needed  fluvoxaMINE 50 mg oral tablet: 1 tab(s) orally once a day (at bedtime)  Nexium 24HR 20 mg oral delayed release tablet: 1 tab(s) orally once a day (in the morning)  Topamax 50 mg oral tablet: 1 tab(s) orally once a day (at bedtime)  Wellbutrin  mg/24 hours oral tablet, extended release: 1 tab(s) orally every 24 hours

## 2020-01-10 NOTE — CHART NOTE - NSCHARTNOTEFT_GEN_A_CORE
Surgery date: January 8, 2020     Pre-Operative Diagnosis: Refractory GERD after Sleeve Gastrectomy     Post-operative Diagnosis: Same    Primary Procedure  [94842] Brittany-En-Y Gastric Bypass - Laparoscopic   Secondary Procedure(s)  [49152] Uppr Gi Endoscopy, Diagnosis   [31456] Transversus Abdominis Plan (TAP) Block Bilateral     Surgeon: Kai Edwards M.D., FACS   Assistant surgeon: Tod Machado MD     Anesthesia type: General Anesthesia      ICD9 Code   Diagnosis   [E66.3]   OVERWEIGHT (Stable)   [K21.9]   GERD WITHOUT ESOPHAGITIS (Stable)   [R13.10]   DYSPHAGIA UNSPECIFIED (Stable)   [Z68.29]   BODY MASS INDEX 29.0-29.9 ADULT (Stable)   [Z98.84]   BARIATRIC SURGERY STATUS (Stable)     Estimated blood loss: 30 ml     DRAINS: none    COMPLICATIONS: none    INDICATIONS FOR THE PROCEDURE:  The patient is a 24-year old female who had a sleeve gastrectomy in 2012. The patient did significantly well with weight loss but developed severe reflux as a result of the sleeve gastrectomy. The patient did not have gastroesophageal reflux disease prior to the sleeve gastrectomy. She was indicated for conversion of her sleeve gastrectomy to Brittany-en-Y gastric bypass.    DESCRIPTION OF PROCEDURE: The patient was identified properly via a time-out. The patient was brought into the operating room and was placed on the operating room table in supine position. The patient was then given general endotracheal anesthesia and was given preoperative antibiotics. Preoperative heparin was given in the ambulatory surgery unit. The patient was then prepped and draped in the usual sterile fashion. An Exparel mixture was mixed at the back table with 20 mL of Exparel, 30 mL of 0.25% Marcaine and 150 mL of normal saline. A Veress needle was placed in the left upper quadrant. The abdomen was insufflated to 15 millimeters of mercury. Once the abdomen was insufflated, the Exparel mixture was given subcutaneously at the sites of incision. An incision was made within the umbilicus and a 12-millimeter trocar was placed in the abdomen. The laparoscope was inserted and there was no injury on initial trocar placement or initial Veress needle placement. A Transversus Abdominus Plane Block was then conducted by placing 20 mL of the Exparel mixture preperitoneal at the distribution of the spinal nerves on the lateral abdominal wall. This was started at the costal margin at the mid axillary line. 20cc of the Exparel mixture was placed in this area. Another 20 mL was placed four centimeters caudal to this area. Another 20 mL was placed four centimeters caudal to this area and another 15 mL was placed four centimeters caudal to this area. This was repeated on the opposite side of the body in a similar fashion. The rest of the Exparel was placed into the subcutaneous tissues and preperitoneal at every trocar site. Under direct vision, the 12 mm trocar was placed in the right upper quadrant and mid clavicular line and a 5 mm trocar was placed in the right upper quadrant in the anterior axillary line. A 12 mm and 5 mm trocar was placed in the left upper quadrant. A 5 mm subxiphoid incision was then made and through this the Veronica liver retractor was inserted and was held in place using the medi-flex device. The patient was then placed into steep reverse Trendelenburg position. The ligament of Treitz was identified after retracting the colon and greater omentum superiorly. The small bowel was measured for 80 cm from the ligament of Treitz and divided using a 60 mm I drive stapler using the tan load. The Brittany limb was then measured at this point for 150 cm. The Brittany limb was marked using a Penrose drain and was sutured to the end of the Brittany limb using a 2-0 Surgidac suture. The biliopancreatic limb and the Brittany limb were then approximated using two sutures using the Endostitch device. Enterotomies were made in both these limbs. A single firing of the 60 mm tan load was then used to create a functional side to side anastomosis. The enterotomies sites were closed using a running 2-0 Vicryl suture using the Endostitch device. On completion of the anastomosis, the mesenteric defects between the small bowel mesentery were closed with the 2-0 Surgidac suture using the Endostitch device. The greater omentum was then divided longitudinally to create space for the Brittany limb to come into the anticolic and antigastric position. At this time, the upper part of the sleeve gastrectomy was dissected and transected approxiametly 3 cm from the GEJ. A 60 mm purple load was used to create a transverse cut into the stomach for the creation of the pouch. The Brittany limb and the gastric pouch were then approximated using 2-0 Vicryl suture using the Endostitch device. Enterotomies measuring approximately 1 cm were made in both the gastric pouch and the Brittany limb. A completely hand sewn anastomosis was then performed using an inner layer of running Vicryl suture and we then performed an outer layer of running 2-0 Vicryl suture in a Lembert fashion. After completion of the anastomosis, the Brittany limb was clamped and an intraoperative endoscopy was performed. It was found that there was no bleeding or stricture at the anastomosis, and after pumping air and submerging the anastomosis underwater no leak was found. Hemostasis was assured. The abdominal cavity was irrigated and suctioned. Satisfied with the surgery at this point, the liver retractor was removed under direct vision. The remaining trocars were then removed. The skin was then closed with running Monocryl sutures and dermabond was applied over that. The patient tolerated the procedure well. The patient was extubated in the operating room.     Disposition  To recovery room, VS stable.

## 2020-01-14 DIAGNOSIS — K21.9 GASTRO-ESOPHAGEAL REFLUX DISEASE WITHOUT ESOPHAGITIS: ICD-10-CM

## 2020-01-14 DIAGNOSIS — R13.10 DYSPHAGIA, UNSPECIFIED: ICD-10-CM

## 2020-01-14 DIAGNOSIS — Z98.84 BARIATRIC SURGERY STATUS: ICD-10-CM

## 2020-03-13 ENCOUNTER — APPOINTMENT (OUTPATIENT)
Dept: OBGYN | Facility: CLINIC | Age: 25
End: 2020-03-13
Payer: MEDICAID

## 2020-03-13 VITALS
WEIGHT: 160 LBS | SYSTOLIC BLOOD PRESSURE: 120 MMHG | TEMPERATURE: 98 F | DIASTOLIC BLOOD PRESSURE: 80 MMHG | BODY MASS INDEX: 25.71 KG/M2 | HEIGHT: 66 IN

## 2020-03-13 DIAGNOSIS — Z78.9 OTHER SPECIFIED HEALTH STATUS: ICD-10-CM

## 2020-03-13 DIAGNOSIS — Z87.19 PERSONAL HISTORY OF OTHER DISEASES OF THE DIGESTIVE SYSTEM: ICD-10-CM

## 2020-03-13 PROCEDURE — 99214 OFFICE O/P EST MOD 30 MIN: CPT

## 2020-03-13 RX ORDER — TERCONAZOLE 8 MG/G
0.8 CREAM VAGINAL
Qty: 1 | Refills: 1 | Status: DISCONTINUED | COMMUNITY
Start: 2018-08-09 | End: 2020-03-13

## 2020-03-13 RX ORDER — ACAMPROSATE CALCIUM 333 MG/1
333 TABLET, DELAYED RELEASE ORAL
Qty: 18 | Refills: 0 | Status: COMPLETED | COMMUNITY
Start: 2019-11-08

## 2020-03-13 RX ORDER — METRONIDAZOLE 7.5 MG/G
0.75 GEL VAGINAL
Qty: 1 | Refills: 0 | Status: DISCONTINUED | COMMUNITY
Start: 2018-12-18 | End: 2020-03-13

## 2020-03-13 RX ORDER — METRONIDAZOLE 7.5 MG/G
0.75 GEL VAGINAL
Qty: 1 | Refills: 0 | Status: DISCONTINUED | COMMUNITY
Start: 2019-10-28 | End: 2020-03-13

## 2020-03-13 RX ORDER — TOPIRAMATE 50 MG/1
50 TABLET, FILM COATED ORAL
Qty: 180 | Refills: 0 | Status: DISCONTINUED | COMMUNITY
Start: 2019-08-01 | End: 2020-03-13

## 2020-03-13 RX ORDER — RANITIDINE 150 MG/1
150 TABLET ORAL
Qty: 60 | Refills: 0 | Status: DISCONTINUED | COMMUNITY
Start: 2019-03-01 | End: 2020-03-13

## 2020-03-13 RX ORDER — GABAPENTIN 250 MG/5ML
300 SOLUTION ORAL
Qty: 84 | Refills: 0 | Status: COMPLETED | COMMUNITY
Start: 2020-01-08

## 2020-03-13 RX ORDER — NORETHINDRONE ACETATE AND ETHINYL ESTRADIOL TABLETS AND FERROUS FUMARATE TABLETS 1MG-20(24)
1-20 KIT ORAL
Qty: 3 | Refills: 1 | Status: DISCONTINUED | COMMUNITY
Start: 2019-10-28 | End: 2020-03-13

## 2020-03-13 RX ORDER — AZITHROMYCIN 250 MG/1
250 TABLET, FILM COATED ORAL
Qty: 6 | Refills: 0 | Status: COMPLETED | COMMUNITY
Start: 2019-12-31

## 2020-03-13 NOTE — CHIEF COMPLAINT
[Follow Up] : follow up GYN visit [FreeTextEntry1] : For past 2 days has +urgency, feels like bladder isn't emptying, pain with urination, burning, +frequency.\par Taking azo, not helping.\par Taking junel 24 now and happy with it, menses 3 days.\par Had gastric bypass 1/8/2020

## 2020-03-13 NOTE — COUNSELING
[Breast Self Exam] : breast self exam [Nutrition] : nutrition [Exercise] : exercise [Vitamins/Supplements] : vitamins/supplements [FreeTextEntry2] : post coital voiding discussed

## 2020-03-16 LAB — BACTERIA UR CULT: NORMAL

## 2020-04-01 ENCOUNTER — TRANSCRIPTION ENCOUNTER (OUTPATIENT)
Age: 25
End: 2020-04-01

## 2020-04-22 ENCOUNTER — INPATIENT (INPATIENT)
Facility: HOSPITAL | Age: 25
LOS: 0 days | Discharge: HOME | DRG: 337 | End: 2020-04-23
Attending: SURGERY | Admitting: SURGERY
Payer: COMMERCIAL

## 2020-04-22 VITALS — WEIGHT: 145.06 LBS

## 2020-04-22 DIAGNOSIS — Z90.3 ACQUIRED ABSENCE OF STOMACH [PART OF]: Chronic | ICD-10-CM

## 2020-04-22 DIAGNOSIS — Z98.890 OTHER SPECIFIED POSTPROCEDURAL STATES: Chronic | ICD-10-CM

## 2020-04-22 NOTE — ED ADULT TRIAGE NOTE - CHIEF COMPLAINT QUOTE
pt presents to ED due to abdominal pain pt had recent bypass sx 1/2020  now having severe abdominal cramping and pain since 1pm

## 2020-04-23 ENCOUNTER — TRANSCRIPTION ENCOUNTER (OUTPATIENT)
Age: 25
End: 2020-04-23

## 2020-04-23 VITALS
DIASTOLIC BLOOD PRESSURE: 58 MMHG | SYSTOLIC BLOOD PRESSURE: 114 MMHG | OXYGEN SATURATION: 100 % | RESPIRATION RATE: 19 BRPM | HEART RATE: 78 BPM

## 2020-04-23 DIAGNOSIS — Z98.84 BARIATRIC SURGERY STATUS: Chronic | ICD-10-CM

## 2020-04-23 DIAGNOSIS — K56.609 UNSPECIFIED INTESTINAL OBSTRUCTION, UNSPECIFIED AS TO PARTIAL VERSUS COMPLETE OBSTRUCTION: ICD-10-CM

## 2020-04-23 LAB
ALBUMIN SERPL ELPH-MCNC: 3.9 G/DL — SIGNIFICANT CHANGE UP (ref 3.3–5)
ALP SERPL-CCNC: 52 U/L — SIGNIFICANT CHANGE UP (ref 40–120)
ALT FLD-CCNC: 36 U/L — SIGNIFICANT CHANGE UP (ref 12–78)
ANION GAP SERPL CALC-SCNC: 8 MMOL/L — SIGNIFICANT CHANGE UP (ref 5–17)
AST SERPL-CCNC: 22 U/L — SIGNIFICANT CHANGE UP (ref 15–37)
BASOPHILS # BLD AUTO: 0.05 K/UL — SIGNIFICANT CHANGE UP (ref 0–0.2)
BASOPHILS NFR BLD AUTO: 0.5 % — SIGNIFICANT CHANGE UP (ref 0–2)
BILIRUB SERPL-MCNC: 0.3 MG/DL — SIGNIFICANT CHANGE UP (ref 0.2–1.2)
BUN SERPL-MCNC: 12 MG/DL — SIGNIFICANT CHANGE UP (ref 7–23)
CALCIUM SERPL-MCNC: 8.8 MG/DL — SIGNIFICANT CHANGE UP (ref 8.5–10.1)
CHLORIDE SERPL-SCNC: 103 MMOL/L — SIGNIFICANT CHANGE UP (ref 96–108)
CO2 SERPL-SCNC: 25 MMOL/L — SIGNIFICANT CHANGE UP (ref 22–31)
CREAT SERPL-MCNC: 0.85 MG/DL — SIGNIFICANT CHANGE UP (ref 0.5–1.3)
EOSINOPHIL # BLD AUTO: 0.14 K/UL — SIGNIFICANT CHANGE UP (ref 0–0.5)
EOSINOPHIL NFR BLD AUTO: 1.5 % — SIGNIFICANT CHANGE UP (ref 0–6)
GLUCOSE SERPL-MCNC: 98 MG/DL — SIGNIFICANT CHANGE UP (ref 70–99)
HCG SERPL-ACNC: <1 MIU/ML — SIGNIFICANT CHANGE UP
HCT VFR BLD CALC: 31.3 % — LOW (ref 34.5–45)
HGB BLD-MCNC: 9.6 G/DL — LOW (ref 11.5–15.5)
IMM GRANULOCYTES NFR BLD AUTO: 0.2 % — SIGNIFICANT CHANGE UP (ref 0–1.5)
LACTATE SERPL-SCNC: 0.9 MMOL/L — SIGNIFICANT CHANGE UP (ref 0.7–2)
LIDOCAIN IGE QN: 100 U/L — SIGNIFICANT CHANGE UP (ref 73–393)
LYMPHOCYTES # BLD AUTO: 1.35 K/UL — SIGNIFICANT CHANGE UP (ref 1–3.3)
LYMPHOCYTES # BLD AUTO: 14.5 % — SIGNIFICANT CHANGE UP (ref 13–44)
MAGNESIUM SERPL-MCNC: 2.1 MG/DL — SIGNIFICANT CHANGE UP (ref 1.6–2.6)
MCHC RBC-ENTMCNC: 21.9 PG — LOW (ref 27–34)
MCHC RBC-ENTMCNC: 30.7 GM/DL — LOW (ref 32–36)
MCV RBC AUTO: 71.5 FL — LOW (ref 80–100)
MONOCYTES # BLD AUTO: 0.75 K/UL — SIGNIFICANT CHANGE UP (ref 0–0.9)
MONOCYTES NFR BLD AUTO: 8 % — SIGNIFICANT CHANGE UP (ref 2–14)
NEUTROPHILS # BLD AUTO: 7.03 K/UL — SIGNIFICANT CHANGE UP (ref 1.8–7.4)
NEUTROPHILS NFR BLD AUTO: 75.3 % — SIGNIFICANT CHANGE UP (ref 43–77)
PLATELET # BLD AUTO: 258 K/UL — SIGNIFICANT CHANGE UP (ref 150–400)
POTASSIUM SERPL-MCNC: 3.5 MMOL/L — SIGNIFICANT CHANGE UP (ref 3.5–5.3)
POTASSIUM SERPL-SCNC: 3.5 MMOL/L — SIGNIFICANT CHANGE UP (ref 3.5–5.3)
PROT SERPL-MCNC: 7.3 GM/DL — SIGNIFICANT CHANGE UP (ref 6–8.3)
RBC # BLD: 4.38 M/UL — SIGNIFICANT CHANGE UP (ref 3.8–5.2)
RBC # FLD: 15.9 % — HIGH (ref 10.3–14.5)
SARS-COV-2 RNA SPEC QL NAA+PROBE: SIGNIFICANT CHANGE UP
SODIUM SERPL-SCNC: 136 MMOL/L — SIGNIFICANT CHANGE UP (ref 135–145)
WBC # BLD: 9.34 K/UL — SIGNIFICANT CHANGE UP (ref 3.8–10.5)
WBC # FLD AUTO: 9.34 K/UL — SIGNIFICANT CHANGE UP (ref 3.8–10.5)

## 2020-04-23 PROCEDURE — 86900 BLOOD TYPING SEROLOGIC ABO: CPT

## 2020-04-23 PROCEDURE — 86901 BLOOD TYPING SEROLOGIC RH(D): CPT

## 2020-04-23 PROCEDURE — 86850 RBC ANTIBODY SCREEN: CPT

## 2020-04-23 PROCEDURE — 87635 SARS-COV-2 COVID-19 AMP PRB: CPT

## 2020-04-23 PROCEDURE — 74177 CT ABD & PELVIS W/CONTRAST: CPT | Mod: 26

## 2020-04-23 PROCEDURE — C9290: CPT

## 2020-04-23 PROCEDURE — 36415 COLL VENOUS BLD VENIPUNCTURE: CPT

## 2020-04-23 RX ORDER — ACETAMINOPHEN 500 MG
1000 TABLET ORAL ONCE
Refills: 0 | Status: COMPLETED | OUTPATIENT
Start: 2020-04-23 | End: 2020-04-23

## 2020-04-23 RX ORDER — ONDANSETRON 8 MG/1
4 TABLET, FILM COATED ORAL ONCE
Refills: 0 | Status: DISCONTINUED | OUTPATIENT
Start: 2020-04-23 | End: 2020-04-23

## 2020-04-23 RX ORDER — FENTANYL CITRATE 50 UG/ML
25 INJECTION INTRAVENOUS
Refills: 0 | Status: DISCONTINUED | OUTPATIENT
Start: 2020-04-23 | End: 2020-04-23

## 2020-04-23 RX ORDER — FAMOTIDINE 10 MG/ML
20 INJECTION INTRAVENOUS ONCE
Refills: 0 | Status: COMPLETED | OUTPATIENT
Start: 2020-04-23 | End: 2020-04-23

## 2020-04-23 RX ORDER — MORPHINE SULFATE 50 MG/1
4 CAPSULE, EXTENDED RELEASE ORAL ONCE
Refills: 0 | Status: DISCONTINUED | OUTPATIENT
Start: 2020-04-23 | End: 2020-04-23

## 2020-04-23 RX ORDER — SODIUM CHLORIDE 9 MG/ML
1000 INJECTION INTRAMUSCULAR; INTRAVENOUS; SUBCUTANEOUS
Refills: 0 | Status: DISCONTINUED | OUTPATIENT
Start: 2020-04-23 | End: 2020-04-23

## 2020-04-23 RX ORDER — HYDROMORPHONE HYDROCHLORIDE 2 MG/ML
1 INJECTION INTRAMUSCULAR; INTRAVENOUS; SUBCUTANEOUS ONCE
Refills: 0 | Status: DISCONTINUED | OUTPATIENT
Start: 2020-04-23 | End: 2020-04-23

## 2020-04-23 RX ORDER — MEPERIDINE HYDROCHLORIDE 50 MG/ML
12.5 INJECTION INTRAMUSCULAR; INTRAVENOUS; SUBCUTANEOUS
Refills: 0 | Status: DISCONTINUED | OUTPATIENT
Start: 2020-04-23 | End: 2020-04-23

## 2020-04-23 RX ORDER — TOPIRAMATE 25 MG
50 TABLET ORAL AT BEDTIME
Refills: 0 | Status: DISCONTINUED | OUTPATIENT
Start: 2020-04-23 | End: 2020-04-23

## 2020-04-23 RX ORDER — MORPHINE SULFATE 50 MG/1
2 CAPSULE, EXTENDED RELEASE ORAL EVERY 4 HOURS
Refills: 0 | Status: DISCONTINUED | OUTPATIENT
Start: 2020-04-23 | End: 2020-04-23

## 2020-04-23 RX ORDER — SODIUM CHLORIDE 9 MG/ML
1000 INJECTION INTRAMUSCULAR; INTRAVENOUS; SUBCUTANEOUS ONCE
Refills: 0 | Status: COMPLETED | OUTPATIENT
Start: 2020-04-23 | End: 2020-04-23

## 2020-04-23 RX ORDER — OXYCODONE AND ACETAMINOPHEN 5; 325 MG/1; MG/1
1 TABLET ORAL EVERY 4 HOURS
Refills: 0 | Status: DISCONTINUED | OUTPATIENT
Start: 2020-04-23 | End: 2020-04-23

## 2020-04-23 RX ORDER — ONDANSETRON 8 MG/1
4 TABLET, FILM COATED ORAL ONCE
Refills: 0 | Status: COMPLETED | OUTPATIENT
Start: 2020-04-23 | End: 2020-04-23

## 2020-04-23 RX ORDER — HYDROMORPHONE HYDROCHLORIDE 2 MG/ML
1 INJECTION INTRAMUSCULAR; INTRAVENOUS; SUBCUTANEOUS
Refills: 0 | Status: DISCONTINUED | OUTPATIENT
Start: 2020-04-23 | End: 2020-04-23

## 2020-04-23 RX ORDER — SODIUM CHLORIDE 9 MG/ML
1000 INJECTION, SOLUTION INTRAVENOUS
Refills: 0 | Status: DISCONTINUED | OUTPATIENT
Start: 2020-04-23 | End: 2020-04-23

## 2020-04-23 RX ORDER — ENOXAPARIN SODIUM 100 MG/ML
40 INJECTION SUBCUTANEOUS DAILY
Refills: 0 | Status: DISCONTINUED | OUTPATIENT
Start: 2020-04-23 | End: 2020-04-23

## 2020-04-23 RX ORDER — OXYCODONE AND ACETAMINOPHEN 5; 325 MG/1; MG/1
2 TABLET ORAL EVERY 4 HOURS
Refills: 0 | Status: DISCONTINUED | OUTPATIENT
Start: 2020-04-23 | End: 2020-04-23

## 2020-04-23 RX ORDER — SODIUM CHLORIDE 9 MG/ML
1000 INJECTION, SOLUTION INTRAVENOUS ONCE
Refills: 0 | Status: COMPLETED | OUTPATIENT
Start: 2020-04-23 | End: 2020-04-23

## 2020-04-23 RX ORDER — FLUVOXAMINE MALEATE 25 MG/1
50 TABLET ORAL AT BEDTIME
Refills: 0 | Status: DISCONTINUED | OUTPATIENT
Start: 2020-04-23 | End: 2020-04-23

## 2020-04-23 RX ORDER — BUPROPION HYDROCHLORIDE 150 MG/1
300 TABLET, EXTENDED RELEASE ORAL DAILY
Refills: 0 | Status: DISCONTINUED | OUTPATIENT
Start: 2020-04-23 | End: 2020-04-23

## 2020-04-23 RX ORDER — ONDANSETRON 8 MG/1
4 TABLET, FILM COATED ORAL EVERY 6 HOURS
Refills: 0 | Status: DISCONTINUED | OUTPATIENT
Start: 2020-04-23 | End: 2020-04-23

## 2020-04-23 RX ADMIN — HYDROMORPHONE HYDROCHLORIDE 1 MILLIGRAM(S): 2 INJECTION INTRAMUSCULAR; INTRAVENOUS; SUBCUTANEOUS at 02:50

## 2020-04-23 RX ADMIN — FENTANYL CITRATE 25 MICROGRAM(S): 50 INJECTION INTRAVENOUS at 07:34

## 2020-04-23 RX ADMIN — FENTANYL CITRATE 25 MICROGRAM(S): 50 INJECTION INTRAVENOUS at 08:08

## 2020-04-23 RX ADMIN — SODIUM CHLORIDE 1000 MILLILITER(S): 9 INJECTION, SOLUTION INTRAVENOUS at 03:21

## 2020-04-23 RX ADMIN — MORPHINE SULFATE 4 MILLIGRAM(S): 50 CAPSULE, EXTENDED RELEASE ORAL at 02:25

## 2020-04-23 RX ADMIN — SODIUM CHLORIDE 1000 MILLILITER(S): 9 INJECTION INTRAMUSCULAR; INTRAVENOUS; SUBCUTANEOUS at 01:21

## 2020-04-23 RX ADMIN — FENTANYL CITRATE 25 MICROGRAM(S): 50 INJECTION INTRAVENOUS at 07:26

## 2020-04-23 RX ADMIN — ONDANSETRON 4 MILLIGRAM(S): 8 TABLET, FILM COATED ORAL at 01:00

## 2020-04-23 RX ADMIN — SODIUM CHLORIDE 2000 MILLILITER(S): 9 INJECTION INTRAMUSCULAR; INTRAVENOUS; SUBCUTANEOUS at 00:21

## 2020-04-23 RX ADMIN — SODIUM CHLORIDE 75 MILLILITER(S): 9 INJECTION, SOLUTION INTRAVENOUS at 07:31

## 2020-04-23 RX ADMIN — SODIUM CHLORIDE 1000 MILLILITER(S): 9 INJECTION, SOLUTION INTRAVENOUS at 02:45

## 2020-04-23 RX ADMIN — Medication 400 MILLIGRAM(S): at 08:12

## 2020-04-23 RX ADMIN — FAMOTIDINE 20 MILLIGRAM(S): 10 INJECTION INTRAVENOUS at 00:59

## 2020-04-23 NOTE — H&P ADULT - ATTENDING COMMENTS
Imaging reviewed and discussed with patient. Risk & benefits of diagnostic laparoscopy with patient in the setting of internal hernia was discussed in laymen's terms. Patient understands risk's and wishes to proceed with surgical intervention

## 2020-04-23 NOTE — H&P ADULT - NSICDXPASTSURGICALHX_GEN_ALL_CORE_FT
PAST SURGICAL HISTORY:  History of augmentation of both breasts 8/ 2014    History of sleeve gastrectomy 5/ 2012    S/P endoscopy upper - 10/2019    S/P gastric bypass 01/2020

## 2020-04-23 NOTE — ED ADULT NURSE NOTE - OBJECTIVE STATEMENT
Pt presents to er with complaints of abd pain to upper/lower with previous band surgery, pt states she had procedure due to gastric reflux procedure, states she is constipated and passed gas earlier today, pt unable to lie flat, abd soft, non-distended, painful to light palpation, color appropriate for ethnicity, respirations unlabored, pt denies sob, chest pain, headaches, safety maintained, will continue to monitor.

## 2020-04-23 NOTE — BRIEF OPERATIVE NOTE - OPERATION/FINDINGS
Intestinal Adhesions at JJ anastomosis and at terminal ileum, no internal hernia, no Gamboa defect noted

## 2020-04-23 NOTE — BRIEF OPERATIVE NOTE - NSICDXBRIEFPROCEDURE_GEN_ALL_CORE_FT
PROCEDURES:  Laparoscopic lysis of intestinal adhesions 23-Apr-2020 07:17:59  Soni Blackburn  Diagnostic laparoscopy 23-Apr-2020 07:17:28  Soni Blackburn

## 2020-04-23 NOTE — ED PROVIDER NOTE - CLINICAL SUMMARY MEDICAL DECISION MAKING FREE TEXT BOX
23 y/o F s/p laparoscopic Brittany en Y gastric bypass p/w severe diffuse abd pain and vomiting suspect SBO vs gastritis vs anastomotic leak.  Plan: pain control, IV fluids, labs CTAP

## 2020-04-23 NOTE — H&P ADULT - ASSESSMENT
24 F w/ hx of RYGB, presents with SBO and internal hernia     admit to surgery service  NPO  IVF hydration  preop labs, T/S, Covid swab  plan for diagnostic laparoscopy and indicated procedures  DVT/GI ppx    plan discussed with surgery attending Dr Machado

## 2020-04-23 NOTE — H&P ADULT - NSHPPHYSICALEXAM_GEN_ALL_CORE
General: WDWN, afebrile, young female  Neuro: A&Ox3  Chest: normal, nontender  CV: RRR, S1/S2  Abd: soft, tender in epigastric area, nondistended, no palpable mass or aorta  Ext: no peripheral edema

## 2020-04-23 NOTE — DISCHARGE NOTE PROVIDER - NSDCCPCAREPLAN_GEN_ALL_CORE_FT
PRINCIPAL DISCHARGE DIAGNOSIS  Diagnosis: Small bowel obstruction  Assessment and Plan of Treatment:       SECONDARY DISCHARGE DIAGNOSES  Diagnosis: Intestinal adhesions  Assessment and Plan of Treatment:

## 2020-04-23 NOTE — ED PROVIDER NOTE - CPE EDP ENMT NORM
NOTE to MD: patient has declined having occupational therapy at this time. Will continue with PT/SN only.    normal...

## 2020-04-23 NOTE — ED PROVIDER NOTE - OBJECTIVE STATEMENT
23 y/o F with h/o laparoscopic conversion of gastric sleeve to Brittany en Y by Dr. Edwards on 1/8/2020 p/w diffuse abd pain that began in the epigastric area and radiates beginning at 1300 yesterday afternoon.  Pain is severe cramping and associated with nausea and one episode of NBNB emesis at about 1700 yesterday after attempting to eat. Pain is constant but waxing and waning in intensity.  Pt hasn't had a BM in about 4 days.  Pt is having difficulty passing gas.  No recent melena or BRBPR.  Pt was taken off of PPI recently after a tele-medicine visit with bariatric surgery.  Pt denies f/c/r, cough, CP or SOB.  She contacted Dr. Machado who advised her to get labs and CTAP.

## 2020-04-23 NOTE — H&P ADULT - NSHPLABSRESULTS_GEN_ALL_CORE
9.6    9.34  )-----------( 258      ( 23 Apr 2020 00:05 )             31.3   04-23    136  |  103  |  12  ----------------------------<  98  3.5   |  25  |  0.85    Ca    8.8      23 Apr 2020 00:05  Mg     2.1     04-23    TPro  7.3  /  Alb  3.9  /  TBili  0.3  /  DBili  x   /  AST  22  /  ALT  36  /  AlkPhos  52  04-23  < from: CT Abdomen and Pelvis w/ Oral Cont and w/ IV Cont (04.23.20 @ 02:03) >      EXAM:  CT ABDOMEN AND PELVIS OC IC                            PROCEDURE DATE:  04/23/2020          INTERPRETATION:  CLINICAL INFORMATION: Diffuse abdominal pain, Brittany-en-Y gastric bypass on 1/8/2020.    PROCEDURE:   Helical axial images were obtained from the domes of the diaphragm through the pubic symphysis following the administration of oral contrast and intravenous contrast. 90 mls of Omnipaque-350 administered without complication. 10 ml(s) discarded. Coronal and sagittal reformats were also obtained.    COMPARISON: Esophagram 1/9/2020.    FINDINGS:    LOWER CHEST: Partially visualized bilateral breast implants.    LIVER: Unremarkable.  GALLBLADDER/BILE DUCTS: No intrahepatic or extrahepatic biliary dilatation. No significant gallbladder edema.  PANCREAS: Unremarkable.  SPLEEN: Unremarkable.    ADRENALS: Unremarkable.  KIDNEYS/URETERS: No hydronephrosis, hydroureter or significant perinephric stranding.    BLADDER: Partially distended.  REPRODUCTIVE ORGANS: Unremarkable uterus andleft adnexa. A 1.3 x 1.6 cm right ovarian follicle.    BOWEL: Brittany-en-Y gastric bypass. Nonspecific trace fluid in the excluded stomach. Beaked transition point in the distal ileum in the right lower medial abdomen (602:46) with upstream small bowel dilatation and distal collapse. Somewhat clumped bowel loops at the small bowel anastomosis located in the right medial abdomen. Possible tiny jejunal intussusception proximal to the anastomosis (2:51 and 602:48). Nonspecific bowel wall thickening at the transition point. Unremarkable appendix.   PERITONEUM: No drainable fluid collection or free air. Nonspecific mesenteric edema and small free fluid.  VESSELS: Normal caliber of the abdominal aorta.   RETROPERITONEUM: No lymphadenopathy.Subcentimeter lymph nodes without lymphadenopathy.    ABDOMINAL WALL/SOFT TISSUES: Postsurgical changes along the anterior dislocation. Small fat-containing umbilical hernia.  BONES: Degenerative changes of the spine. Grade 1 anterolisthesis of L5 on S1 with bilateral L5 pars defect. Chronic mild anterior wedging of the lower thoracic spine appear    IMPRESSION:     Brittany-en-Y gastric bypass. Suspect small bowel obstruction secondary to internal hernia. Question tiny intussusception proximal to the small bowel small bowel anastomosis in the right abdomen. Nonspecific bowel wall thickening, mesenteric edema and free fluid, which can be seen in the setting of bowel congestion/ischemia. Recommend clinical correlation.    Discussed with Dr. Choi.      < end of copied text >

## 2020-04-23 NOTE — ASU DISCHARGE PLAN (ADULT/PEDIATRIC) - CALL YOUR DOCTOR IF YOU HAVE ANY OF THE FOLLOWING:
Numbness, tingling, color or temperature change to extremity/Increased irritability or sluggishness/Swelling that gets worse/Fever greater than (need to indicate Fahrenheit or Celsius)/Wound/Surgical Site with redness, or foul smelling discharge or pus/Excessive diarrhea/Nausea and vomiting that does not stop/Unable to urinate/Inability to tolerate liquids or foods/Bleeding that does not stop/Pain not relieved by Medications

## 2020-04-23 NOTE — ED PROVIDER NOTE - NS ED ATTENDING STATEMENT MOD
Verified Results  * XR KNEE 3 VW RIGHT NON INJURY [id number] 03:16PM Bree Weston Order Number: [id number]     Test Name Result Flag Reference   XR KNEE 3 VW RIGHT (Report)     RIGHT KNEE     INDICATION: Pain and weakness in the right knee  COMPARISON: None     VIEWS: 3; 3 images     FINDINGS:     There is no acute fracture or dislocation  There is no joint effusion  There is mild patellofemoral compartment osteoarthritis as evidenced by marginal osteophytes  No lytic or blastic lesions are seen  Soft tissues are unremarkable  IMPRESSION:     No acute osseous abnormality  Mild patellofemoral compartment osteoarthritis         Workstation performed: [id number]     Signed by:   Larry Garcia MD   9/13/16 Attending Only

## 2020-04-23 NOTE — ED ADULT NURSE REASSESSMENT NOTE - NS ED NURSE REASSESS COMMENT FT1
Pt started drinking oral contrast at this time, as per ct, pt will be going to ct at 02:00, pt made aware at this time, will continue to monitor.

## 2020-04-23 NOTE — BRIEF OPERATIVE NOTE - NSICDXBRIEFPOSTOP_GEN_ALL_CORE_FT
POST-OP DIAGNOSIS:  Intestinal obstruction due to adhesions 23-Apr-2020 07:18:47  Soni Blackburn  Small bowel obstruction 23-Apr-2020 07:18:14  Soni Blackburn

## 2020-04-23 NOTE — DISCHARGE NOTE PROVIDER - NSDCMRMEDTOKEN_GEN_ALL_CORE_FT
clonazePAM 0.5 mg oral tablet: 1 tab(s) orally 3 times a day, As Needed  fluvoxaMINE 50 mg oral tablet: 1 tab(s) orally once a day (at bedtime)  Nexium 24HR 20 mg oral delayed release tablet: 1 tab(s) orally once a day (in the morning)  oxycodone-acetaminophen 5 mg-325 mg oral tablet: 1-2 tab(s) orally every 4-6 hours, As Needed -for moderate pain MDD:4gm   Topamax 50 mg oral tablet: 1 tab(s) orally once a day (at bedtime)  Wellbutrin  mg/24 hours oral tablet, extended release: 1 tab(s) orally every 24 hours

## 2020-04-23 NOTE — ASU DISCHARGE PLAN (ADULT/PEDIATRIC) - CARE PROVIDER_API CALL
Tod Machado)  Surgery  [address] [facility], [address]  [place] [address]  Phone: [phone]  Fax: [phone]  Follow Up Time:

## 2020-04-23 NOTE — DISCHARGE NOTE PROVIDER - HOSPITAL COURSE
24 F admitted overnight for small bowel obstruction due to possible internal hernia, patient was taken overnight to surgery, underwent a diagnostic laparoscopy and lysis of intestinal adhesions. Patient tolerated the procedure well. Patient is now on liquid diet which will then be advanced to regular bariatric diet for lunch. Patient can return home today early afternoon and follow up in 1 week in bariatric clinic with Dr Edwards and Dr Machado.

## 2020-04-23 NOTE — BRIEF OPERATIVE NOTE - NSICDXBRIEFPREOP_GEN_ALL_CORE_FT
PRE-OP DIAGNOSIS:  Small bowel obstruction 23-Apr-2020 07:17:08  Soni Blackburn  Internal hernia 23-Apr-2020 07:17:00  Soni Blackburn

## 2020-04-23 NOTE — DISCHARGE NOTE PROVIDER - NSDCCPTREATMENT_GEN_ALL_CORE_FT
PRINCIPAL PROCEDURE  Procedure: Diagnostic laparoscopy  Findings and Treatment:       SECONDARY PROCEDURE  Procedure: Lysis of intestinal adhesions  Findings and Treatment:

## 2020-04-23 NOTE — DISCHARGE NOTE PROVIDER - CARE PROVIDER_API CALL
Kai Edwards)  Surgery  [address] [facility], [address]  [place] [address]  Phone: [phone]  Fax: [phone]  Established Patient  Follow Up Time: 1 week hard copy, drawn during this pregnancy

## 2020-04-23 NOTE — ED ADULT NURSE REASSESSMENT NOTE - NS ED NURSE REASSESS COMMENT FT1
Pt back from CT with increased abd pain,  notified, 4mg Morphine ordered, will administer after verification.

## 2020-04-23 NOTE — H&P ADULT - HISTORY OF PRESENT ILLNESS
25 y/o F with h/o laparoscopic conversion of gastric sleeve to Brittany en Y by Dr. Edwards on 1/8/2020 p/w diffuse abd pain that began in the epigastric area and radiates beginning at 1300 yesterday afternoon.  Pain is severe cramping and associated with nausea and one episode of NBNB emesis at about 1700 yesterday after attempting to eat. Pain is constant but waxing and waning in intensity.  Pt hasn't had a BM in about 3 days.  Pt is having difficulty passing gas.  No recent melena or BRBPR.  Pt was taken off of PPI recently and admits to persistent reflux symptoms since off medicine. Pt underwent a conversion from a lap sleeve to a bypass for reflux symptoms. Denies any fever, chills, chest pain, shortness of breath. Admits to nausea, vomiting, abdominal pain and constipation.

## 2020-04-23 NOTE — ED PROVIDER NOTE - PSH
History of augmentation of both breasts  8/ 2014  History of sleeve gastrectomy  5/ 2012  S/P endoscopy  upper - 10/2019

## 2020-04-25 ENCOUNTER — MESSAGE (OUTPATIENT)
Age: 25
End: 2020-04-25

## 2020-04-29 NOTE — CHART NOTE - NSCHARTNOTEFT_GEN_A_CORE
Surgery date:  4-    Pre-Operative Diagnosis: Small Bowel Obstruction, Internal Hernia    Post-operative Diagnosis: Small Bowel Obstruction due to adhesions    Primary Procedure  [98070] Laparoscopic Lysis of Adhesions     Secondary Procedure(s)  [52654] Bilateral TAP Block    Surgeon(s)  Surgeon:  Kai Edwards MD    Assistant surgeon: Tod Machado MD    Anesthesia type: General Anesthesia     Specimens:  none     Estimated blood loss: 30 ml     DRAINS: NONE    COMPLICATIONS: NONE     INDICATIONS OF THE PROCEDURE: The patient is a 24-year-old female who has a history of a laparoscopic conversion of a sleeve gastrectomy to a laparoscopic Brittany-en-Y gastric bypass on January 8, 2020.  The patient is complaining of diffuse abdominal pain which began in the epigastric area which started yesterday afternoon.  the patient attempted to eat yesterday and had an episode of vomiting.  The patient also has not had a bowel movement in 3 days and states that she has not passed gas.  The CT scan of the abdomen showed an internal hernia and a small bowel obstruction.  The patient was brought to the operating room for diagnostic laparoscopy and to fix her small bowel obstruction.    DESCRIPTION OF PROCEDURE: The patient was identified properly via a time-out. The patient was brought into the operating room and was placed on the operating room table in supine position. The patient was then given general endotracheal anesthesia and was given preoperative antibiotics. Preoperative heparin was given in the ambulatory surgery unit. The patient was then prepped and draped in the usual sterile fashion. An Exparel mixture was mixed at the back table with 20 mL of Exparel, 30 mL of 0.25% Marcaine and 150 mL of normal saline. A Veress needle was placed in the left upper quadrant. The abdomen was insufflated to 15 millimeters of mercury. Once the abdomen was insufflated, the Exparel mixture was given subcutaneously at the sites of incision. An incision was made within the umbilicus and a 5-millimeter trocar was placed in the abdomen. The laparoscope was inserted and there was no injury on initial trocar placement or initial Veress needle placement. A Transversus Abdominus Plane Block was then conducted by placing 20 mL of the Exparel mixture preperitoneal at the distribution of the spinal nerves on the lateral abdominal wall. This was started at the costal margin at the mid axillary line. 20cc of the Exparel mixture was placed in this area. Another 20 mL was placed four centimeters caudal to this area. Another 20 mL was placed four centimeters caudal to this area and another 15 mL was placed four centimeters caudal to this area. This was repeated on the opposite side of the body in a similar fashion. The rest of the Exparel was placed into the subcutaneous tissues and preperitoneal at every trocar site. A 5-millimeter and 12-millimeter trocar was placed in the right upper quadrant. A 5-millimeter and 5-millimeter trocar was placed in left upper quadrant.  We started to run the bowel from the gastric pouch.  We ran the bowel from the Brittany limb to the jejunojejunostomy.  The Brittany limb was then partially obstructed by an adhesion from the jejunojejunostomy to the transverse mesocolon.  The jejunojejunostomy was on the right side of the Brittany limb.  The adhesion was then lysed with the LigaSure device which then freed the jejunojejunostomy and unobstructed the Brittany limb.  The Brittany limb was then continued to be run to the jejunojejunostomy.  The jejunojejunostomy was then run back to the ligament of Treitz which did not show any adhesions.  We then ran the common channel from the jejunojejunostomy to the ilio cecal valve and found another adhesion that partially obstructed the terminal ilium.  This adhesion was from omentum to the cecum by the ilio cecal valve.  This was also lysed with the LigaSure device.  The common channel was dilated due to this adhesion.  Once the adhesion was lysed her obstruction was relieved.  There was no further pathology within the abdomen.  We then checked the 2 mesenteric defects which were adhesed together.  The Petersons defect was adhesed to the mesocolon and the jejunojejunostomy mesenteric defect was also adhesed together  from her previous operation.  The abdomen was then desufflated.  The trochars were then removed.  The skin was closed with 4 Monocryl and Dermabond was applied over that.  The patient tolerated procedure well.    Disposition  To recovery room, VS stable.

## 2020-05-03 DIAGNOSIS — R10.9 UNSPECIFIED ABDOMINAL PAIN: ICD-10-CM

## 2020-05-03 DIAGNOSIS — F32.9 MAJOR DEPRESSIVE DISORDER, SINGLE EPISODE, UNSPECIFIED: ICD-10-CM

## 2020-05-03 DIAGNOSIS — G43.909 MIGRAINE, UNSPECIFIED, NOT INTRACTABLE, WITHOUT STATUS MIGRAINOSUS: ICD-10-CM

## 2020-05-03 DIAGNOSIS — F41.9 ANXIETY DISORDER, UNSPECIFIED: ICD-10-CM

## 2020-05-03 DIAGNOSIS — K56.51 INTESTINAL ADHESIONS [BANDS], WITH PARTIAL OBSTRUCTION: ICD-10-CM

## 2020-05-03 DIAGNOSIS — Z88.0 ALLERGY STATUS TO PENICILLIN: ICD-10-CM

## 2020-05-03 DIAGNOSIS — K21.9 GASTRO-ESOPHAGEAL REFLUX DISEASE WITHOUT ESOPHAGITIS: ICD-10-CM

## 2020-05-03 DIAGNOSIS — Z98.82 BREAST IMPLANT STATUS: ICD-10-CM

## 2020-05-03 DIAGNOSIS — Z98.84 BARIATRIC SURGERY STATUS: ICD-10-CM

## 2020-05-06 ENCOUNTER — APPOINTMENT (OUTPATIENT)
Dept: DISASTER EMERGENCY | Facility: HOSPITAL | Age: 25
End: 2020-05-06

## 2020-05-06 LAB
SARS-COV-2 IGG SERPL IA-ACNC: <0.1 INDEX
SARS-COV-2 IGG SERPL QL IA: NEGATIVE

## 2020-05-11 ENCOUNTER — OUTPATIENT (OUTPATIENT)
Dept: OUTPATIENT SERVICES | Facility: HOSPITAL | Age: 25
LOS: 1 days | Discharge: HOME | End: 2020-05-11

## 2020-05-11 DIAGNOSIS — Z98.84 BARIATRIC SURGERY STATUS: Chronic | ICD-10-CM

## 2020-05-11 DIAGNOSIS — D64.9 ANEMIA, UNSPECIFIED: ICD-10-CM

## 2020-05-11 DIAGNOSIS — Z98.890 OTHER SPECIFIED POSTPROCEDURAL STATES: Chronic | ICD-10-CM

## 2020-05-11 DIAGNOSIS — Z90.3 ACQUIRED ABSENCE OF STOMACH [PART OF]: Chronic | ICD-10-CM

## 2020-05-12 ENCOUNTER — APPOINTMENT (OUTPATIENT)
Dept: HEMATOLOGY ONCOLOGY | Facility: CLINIC | Age: 25
End: 2020-05-12
Payer: COMMERCIAL

## 2020-05-12 PROCEDURE — 99205 OFFICE O/P NEW HI 60 MIN: CPT | Mod: 95

## 2020-05-12 NOTE — ASSESSMENT
[FreeTextEntry1] : Iron deficiency anemia-clinically secondary to menstrual blood loss in this premenopausal female, exacerbated by likely decreased absorption secondary to bariatric surgeries. Discussed treatment options with respective pro's/con's. At this time, patient is unable to tolerate p.o. iron, and wishes for parenteral iron-alternatives discussed. Patient gave verbal consent for IV Feraheme-potential side effects reviewed including but not limited to hypersensitivity reaction, skin hyperpigmentation. Interval followup lab work planned.\par Patient was given the opportunity to ask questions. Her questions have been answered to her apparent satisfaction at this time.

## 2020-05-12 NOTE — OB HISTORY
[Definite:  ___ (Date)] : the last menstrual period was [unfilled] [Regular Cycle Intervals] : periods have been regular [Frequency: Q ___ days] : menstrual periods occur approximately every [unfilled] days [Menarche Age: ____] : age at menarche was [unfilled] [___] : Total Pregnancies: [unfilled]

## 2020-05-12 NOTE — CONSULT LETTER
[Dear  ___] : Dear  [unfilled], [Consult Letter:] : I had the pleasure of evaluating your patient, [unfilled]. [Please see my note below.] : Please see my note below. [Consult Closing:] : Thank you very much for allowing me to participate in the care of this patient.  If you have any questions, please do not hesitate to contact me. [Sincerely,] : Sincerely, [FreeTextEntry3] : Cheryl Norris M.D.

## 2020-05-12 NOTE — RESULTS/DATA
[FreeTextEntry1] : 12/31/19-prothrombin time 10.7/INR 0.97, PTT 28.3.\par 4/23/20-creatinine 0.85, total protein 7.3, total bilirubin 0.3. 4/23/20-MCV 72\par 4/11/20-hemoglobin 9.5, hematocrit 31.6, WBC 4.2, platelet count 313,000, serum iron 25, 7% saturation, ferritin 2. Vitamin B12 is 401, folate 11.3.

## 2020-05-12 NOTE — HISTORY OF PRESENT ILLNESS
[de-identified] : 5/2012-Status post sleeve gastrectomy, after which patient lost 100 pounds. \par 1/2020-Patient underwent revision of her prior bariatric surgery secondary to severe reflux-status post gastric bypass.\par 4/2020-Developed bowel obstruction requiring surgery/lysis of adhesions.\par Patient has a history of heavy menstrual periods with iron deficiency anemia. She is no longer able to tolerate p.o. iron with recent bowel issues.\par Patient is fatigued. No complaints of chest pain, shortness of breath, palpitations. 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

## 2020-05-12 NOTE — REVIEW OF SYSTEMS
[Fatigue] : fatigue [Negative] : Allergic/Immunologic [Dysuria] : no dysuria [Easy Bruising] : no tendency for easy bruising

## 2020-05-14 LAB
ALBUMIN MFR SERPL ELPH: 60 %
ALBUMIN SERPL-MCNC: 4.1 G/DL
ALBUMIN/GLOB SERPL: 1.5 RATIO
ALPHA1 GLOB MFR SERPL ELPH: 3.8 %
ALPHA1 GLOB SERPL ELPH-MCNC: 0.3 G/DL
ALPHA2 GLOB MFR SERPL ELPH: 11.2 %
ALPHA2 GLOB SERPL ELPH-MCNC: 0.8 G/DL
B-GLOBULIN MFR SERPL ELPH: 12.1 %
B-GLOBULIN SERPL ELPH-MCNC: 0.8 G/DL
BASOPHILS # BLD AUTO: 0.06 K/UL
BASOPHILS NFR BLD AUTO: 1.3 %
CREAT SERPL-MCNC: 0.77 MG/DL
EOSINOPHIL # BLD AUTO: 0.11 K/UL
EOSINOPHIL NFR BLD AUTO: 2.4 %
FERRITIN SERPL-MCNC: 6 NG/ML
FOLATE SERPL-MCNC: 16.7 NG/ML
GAMMA GLOB FLD ELPH-MCNC: 0.9 G/DL
GAMMA GLOB MFR SERPL ELPH: 12.9 %
HAPTOGLOB SERPL-MCNC: 136 MG/DL
HCT VFR BLD CALC: 32.8 %
HGB BLD-MCNC: 9.9 G/DL
IMM GRANULOCYTES NFR BLD AUTO: 0 %
INTERPRETATION SERPL IEP-IMP: NORMAL
IRON SATN MFR SERPL: 7 %
IRON SERPL-MCNC: 34 UG/DL
LYMPHOCYTES # BLD AUTO: 1.59 K/UL
LYMPHOCYTES NFR BLD AUTO: 34.1 %
MAN DIFF?: NORMAL
MCHC RBC-ENTMCNC: 22.5 PG
MCHC RBC-ENTMCNC: 30.2 GM/DL
MCV RBC AUTO: 74.5 FL
MONOCYTES # BLD AUTO: 0.43 K/UL
MONOCYTES NFR BLD AUTO: 9.2 %
NEUTROPHILS # BLD AUTO: 2.47 K/UL
NEUTROPHILS NFR BLD AUTO: 53 %
PLATELET # BLD AUTO: 380 K/UL
PROT SERPL-MCNC: 6.8 G/DL
PROT SERPL-MCNC: 6.8 G/DL
RBC # BLD: 4.4 M/UL
RBC # BLD: 4.4 M/UL
RBC # FLD: 17.8 %
RETICS # AUTO: 1.1 %
RETICS AGGREG/RBC NFR: 46.2 K/UL
TIBC SERPL-MCNC: 458 UG/DL
UIBC SERPL-MCNC: 424 UG/DL
VIT B12 SERPL-MCNC: 452 PG/ML
WBC # FLD AUTO: 4.66 K/UL

## 2020-05-15 ENCOUNTER — APPOINTMENT (OUTPATIENT)
Dept: INFUSION THERAPY | Facility: HOSPITAL | Age: 25
End: 2020-05-15

## 2020-05-15 LAB
HGB A MFR BLD: 97.8 %
HGB A2 MFR BLD: 2.2 %
HGB FRACT BLD-IMP: NORMAL

## 2020-05-18 DIAGNOSIS — D50.9 IRON DEFICIENCY ANEMIA, UNSPECIFIED: ICD-10-CM

## 2020-05-22 ENCOUNTER — APPOINTMENT (OUTPATIENT)
Dept: INFUSION THERAPY | Facility: HOSPITAL | Age: 25
End: 2020-05-22

## 2020-05-26 DIAGNOSIS — R11.2 NAUSEA WITH VOMITING, UNSPECIFIED: ICD-10-CM

## 2020-05-29 ENCOUNTER — APPOINTMENT (OUTPATIENT)
Dept: INFUSION THERAPY | Facility: HOSPITAL | Age: 25
End: 2020-05-29

## 2020-05-29 ENCOUNTER — TRANSCRIPTION ENCOUNTER (OUTPATIENT)
Age: 25
End: 2020-05-29

## 2020-05-29 ENCOUNTER — RX RENEWAL (OUTPATIENT)
Age: 25
End: 2020-05-29

## 2020-06-02 ENCOUNTER — APPOINTMENT (OUTPATIENT)
Dept: CT IMAGING | Facility: CLINIC | Age: 25
End: 2020-06-02
Payer: COMMERCIAL

## 2020-06-02 ENCOUNTER — OUTPATIENT (OUTPATIENT)
Dept: OUTPATIENT SERVICES | Facility: HOSPITAL | Age: 25
LOS: 1 days | End: 2020-06-02
Payer: COMMERCIAL

## 2020-06-02 DIAGNOSIS — Z90.3 ACQUIRED ABSENCE OF STOMACH [PART OF]: Chronic | ICD-10-CM

## 2020-06-02 DIAGNOSIS — Z98.890 OTHER SPECIFIED POSTPROCEDURAL STATES: Chronic | ICD-10-CM

## 2020-06-02 DIAGNOSIS — Z00.8 ENCOUNTER FOR OTHER GENERAL EXAMINATION: ICD-10-CM

## 2020-06-02 DIAGNOSIS — Z98.84 BARIATRIC SURGERY STATUS: Chronic | ICD-10-CM

## 2020-06-02 PROCEDURE — 74177 CT ABD & PELVIS W/CONTRAST: CPT | Mod: 26

## 2020-06-02 PROCEDURE — 74177 CT ABD & PELVIS W/CONTRAST: CPT

## 2020-06-05 ENCOUNTER — APPOINTMENT (OUTPATIENT)
Dept: INFUSION THERAPY | Facility: HOSPITAL | Age: 25
End: 2020-06-05

## 2020-06-24 ENCOUNTER — OUTPATIENT (OUTPATIENT)
Dept: OUTPATIENT SERVICES | Facility: HOSPITAL | Age: 25
LOS: 1 days | Discharge: HOME | End: 2020-06-24

## 2020-06-24 DIAGNOSIS — Z98.890 OTHER SPECIFIED POSTPROCEDURAL STATES: Chronic | ICD-10-CM

## 2020-06-24 DIAGNOSIS — Z90.3 ACQUIRED ABSENCE OF STOMACH [PART OF]: Chronic | ICD-10-CM

## 2020-06-24 DIAGNOSIS — D64.9 ANEMIA, UNSPECIFIED: ICD-10-CM

## 2020-06-24 DIAGNOSIS — Z98.84 BARIATRIC SURGERY STATUS: Chronic | ICD-10-CM

## 2020-06-29 ENCOUNTER — APPOINTMENT (OUTPATIENT)
Dept: HEMATOLOGY ONCOLOGY | Facility: CLINIC | Age: 25
End: 2020-06-29
Payer: COMMERCIAL

## 2020-06-29 PROCEDURE — 99212 OFFICE O/P EST SF 10 MIN: CPT | Mod: 95

## 2020-06-29 RX ORDER — NITROFURANTOIN (MONOHYDRATE/MACROCRYSTALS) 25; 75 MG/1; MG/1
100 CAPSULE ORAL
Qty: 14 | Refills: 0 | Status: DISCONTINUED | COMMUNITY
Start: 2020-03-13 | End: 2020-06-29

## 2020-06-29 RX ORDER — OXYCODONE AND ACETAMINOPHEN 5; 325 MG/1; MG/1
5-325 TABLET ORAL
Qty: 30 | Refills: 0 | Status: DISCONTINUED | COMMUNITY
Start: 2020-04-27 | End: 2020-06-29

## 2020-06-29 RX ORDER — FERROUS SULFATE TAB 325 MG (65 MG ELEMENTAL FE) 325 (65 FE) MG
325 (65 FE) TAB ORAL
Qty: 90 | Refills: 0 | Status: DISCONTINUED | COMMUNITY
Start: 2020-02-03 | End: 2020-06-29

## 2020-06-29 NOTE — PHYSICAL EXAM
[Normal] : affect appropriate [de-identified] : breathing appeared unlabored [de-identified] : coloration appeared normal

## 2020-06-29 NOTE — HISTORY OF PRESENT ILLNESS
[de-identified] : 5/2012-Status post sleeve gastrectomy, after which patient lost 100 pounds. \par 1/2020-Patient underwent revision of her prior bariatric surgery secondary to severe reflux-status post gastric bypass.\par 4/2020-Developed bowel obstruction requiring surgery/lysis of adhesions.\par Patient has a history of heavy menstrual periods with iron deficiency anemia. She is no longer able to tolerate p.o. iron with recent bowel issues.\par Receives PRN IV iron/Venofer infusions. [de-identified] : Telehealth visit due to COVID-19 pandemic.\par On OC's. Menses have had light flow.\par S/P 4 Venofer infusions. \par Thinks overall energy may be a little better. 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 No hematuria, BPR or melena. No c/o cough or fevers.

## 2020-06-29 NOTE — ASSESSMENT
[FreeTextEntry1] : Iron deficiency anemia-clinically secondary to h/o menstrual blood loss in this premenopausal female, exacerbated by likely decreased absorption secondary to bariatric surgeries.\par S/P IV venofer infusions. F/U lab work to be done.\par Patient was given the opportunity to ask questions. Her questions have been answered to her apparent satisfaction at this time.

## 2020-06-29 NOTE — REASON FOR VISIT
[Medical Office: ([facility])___] : at the medical office located in  [Home] : at home, [unfilled] , at the time of the visit. [Verbal consent obtained from patient] : the patient, [unfilled] [Follow-Up Visit] : a follow-up visit for [FreeTextEntry2] : iron deficiency anemia

## 2020-06-29 NOTE — OB HISTORY
[Definite:  ___ (Date)] : the last menstrual period was [unfilled] [Frequency: Q ___ days] : menstrual periods occur approximately every [unfilled] days [Menarche Age: ____] : age at menarche was [unfilled] [Regular Cycle Intervals] : periods have been regular [___] : Total Pregnancies: [unfilled]

## 2020-07-01 LAB
BASOPHILS # BLD AUTO: 0.04 K/UL
BASOPHILS NFR BLD AUTO: 0.8 %
EOSINOPHIL # BLD AUTO: 0.1 K/UL
EOSINOPHIL NFR BLD AUTO: 2 %
FERRITIN SERPL-MCNC: 78 NG/ML
HCT VFR BLD CALC: 37.9 %
HGB BLD-MCNC: 11.5 G/DL
IMM GRANULOCYTES NFR BLD AUTO: 0.4 %
IRON SATN MFR SERPL: 9 %
IRON SERPL-MCNC: 31 UG/DL
LYMPHOCYTES # BLD AUTO: 1.05 K/UL
LYMPHOCYTES NFR BLD AUTO: 20.5 %
MAN DIFF?: NORMAL
MCHC RBC-ENTMCNC: 26.7 PG
MCHC RBC-ENTMCNC: 30.3 GM/DL
MCV RBC AUTO: 88.1 FL
MONOCYTES # BLD AUTO: 0.41 K/UL
MONOCYTES NFR BLD AUTO: 8 %
NEUTROPHILS # BLD AUTO: 3.49 K/UL
NEUTROPHILS NFR BLD AUTO: 68.3 %
PLATELET # BLD AUTO: 238 K/UL
RBC # BLD: 4.3 M/UL
RBC # BLD: 4.3 M/UL
RBC # FLD: 21.6 %
RETICS # AUTO: 1.4 %
RETICS AGGREG/RBC NFR: 58.1 K/UL
TIBC SERPL-MCNC: 347 UG/DL
UIBC SERPL-MCNC: 317 UG/DL
WBC # FLD AUTO: 5.11 K/UL

## 2020-07-23 ENCOUNTER — RESULT CHARGE (OUTPATIENT)
Age: 25
End: 2020-07-23

## 2020-07-23 ENCOUNTER — APPOINTMENT (OUTPATIENT)
Dept: OBGYN | Facility: CLINIC | Age: 25
End: 2020-07-23
Payer: COMMERCIAL

## 2020-07-23 VITALS
TEMPERATURE: 98.3 F | SYSTOLIC BLOOD PRESSURE: 112 MMHG | HEIGHT: 66 IN | WEIGHT: 150 LBS | DIASTOLIC BLOOD PRESSURE: 78 MMHG | BODY MASS INDEX: 24.11 KG/M2

## 2020-07-23 LAB
BILIRUB UR QL STRIP: NORMAL
GLUCOSE UR-MCNC: NORMAL
HCG UR QL: 0.2 EU/DL
HGB UR QL STRIP.AUTO: NORMAL
KETONES UR-MCNC: NORMAL
LEUKOCYTE ESTERASE UR QL STRIP: NORMAL
NITRITE UR QL STRIP: NORMAL
PH UR STRIP: 6
PROT UR STRIP-MCNC: NORMAL
SP GR UR STRIP: 1.03

## 2020-07-23 PROCEDURE — 99395 PREV VISIT EST AGE 18-39: CPT

## 2020-07-23 PROCEDURE — 99213 OFFICE O/P EST LOW 20 MIN: CPT | Mod: 25

## 2020-07-23 NOTE — PHYSICAL EXAM
[Awake] : awake [Acute Distress] : no acute distress [Alert] : alert [LAD] : no lymphadenopathy [Goiter] : no goiter [Thyroid Nodule] : no thyroid nodule [Mass] : no breast mass [Nipple Discharge] : no nipple discharge [Soft] : soft [Axillary LAD] : no axillary lymphadenopathy [Distended] : not distended [Tender] : non tender [H/Smegaly] : no hepatosplenomegaly [Oriented x3] : oriented to person, place, and time [Depressed Mood] : not depressed [Flat Affect] : affect not flat [Labia Majora] : labia major [Labia Minora] : labia minora [No Bleeding] : there was no active vaginal bleeding [Normal] : clitoris [Discharge] : a  ~M vaginal discharge was present [Uterine Adnexae] : were not tender and not enlarged

## 2020-07-25 LAB
C TRACH RRNA SPEC QL NAA+PROBE: NOT DETECTED
HBV SURFACE AG SER QL: NONREACTIVE
HCV AB SER QL: NONREACTIVE
HCV S/CO RATIO: 0.07 S/CO
HIV1+2 AB SPEC QL IA.RAPID: NONREACTIVE
N GONORRHOEA RRNA SPEC QL NAA+PROBE: NOT DETECTED
SOURCE TP AMPLIFICATION: NORMAL
T PALLIDUM AB SER QL IA: NEGATIVE

## 2020-08-01 ENCOUNTER — TRANSCRIPTION ENCOUNTER (OUTPATIENT)
Age: 25
End: 2020-08-01

## 2020-10-28 ENCOUNTER — EMERGENCY (EMERGENCY)
Facility: HOSPITAL | Age: 25
LOS: 0 days | Discharge: HOME | End: 2020-10-28
Attending: EMERGENCY MEDICINE
Payer: COMMERCIAL

## 2020-10-28 VITALS
RESPIRATION RATE: 18 BRPM | HEART RATE: 64 BPM | TEMPERATURE: 98 F | DIASTOLIC BLOOD PRESSURE: 89 MMHG | HEIGHT: 72 IN | OXYGEN SATURATION: 98 % | SYSTOLIC BLOOD PRESSURE: 133 MMHG | WEIGHT: 139.99 LBS

## 2020-10-28 VITALS
SYSTOLIC BLOOD PRESSURE: 129 MMHG | RESPIRATION RATE: 20 BRPM | TEMPERATURE: 98 F | OXYGEN SATURATION: 98 % | HEART RATE: 57 BPM | DIASTOLIC BLOOD PRESSURE: 86 MMHG

## 2020-10-28 DIAGNOSIS — Z98.890 OTHER SPECIFIED POSTPROCEDURAL STATES: Chronic | ICD-10-CM

## 2020-10-28 DIAGNOSIS — Z88.0 ALLERGY STATUS TO PENICILLIN: ICD-10-CM

## 2020-10-28 DIAGNOSIS — Z90.3 ACQUIRED ABSENCE OF STOMACH [PART OF]: Chronic | ICD-10-CM

## 2020-10-28 DIAGNOSIS — Z98.84 BARIATRIC SURGERY STATUS: Chronic | ICD-10-CM

## 2020-10-28 DIAGNOSIS — F41.9 ANXIETY DISORDER, UNSPECIFIED: ICD-10-CM

## 2020-10-28 DIAGNOSIS — R10.84 GENERALIZED ABDOMINAL PAIN: ICD-10-CM

## 2020-10-28 DIAGNOSIS — R10.9 UNSPECIFIED ABDOMINAL PAIN: ICD-10-CM

## 2020-10-28 DIAGNOSIS — K21.9 GASTRO-ESOPHAGEAL REFLUX DISEASE WITHOUT ESOPHAGITIS: ICD-10-CM

## 2020-10-28 DIAGNOSIS — F32.9 MAJOR DEPRESSIVE DISORDER, SINGLE EPISODE, UNSPECIFIED: ICD-10-CM

## 2020-10-28 DIAGNOSIS — Z90.3 ACQUIRED ABSENCE OF STOMACH [PART OF]: ICD-10-CM

## 2020-10-28 DIAGNOSIS — K59.00 CONSTIPATION, UNSPECIFIED: ICD-10-CM

## 2020-10-28 DIAGNOSIS — Z98.84 BARIATRIC SURGERY STATUS: ICD-10-CM

## 2020-10-28 LAB
ALBUMIN SERPL ELPH-MCNC: 3.4 G/DL — SIGNIFICANT CHANGE UP (ref 3.3–5)
ALP SERPL-CCNC: 56 U/L — SIGNIFICANT CHANGE UP (ref 40–120)
ALT FLD-CCNC: 28 U/L — SIGNIFICANT CHANGE UP (ref 12–78)
ANION GAP SERPL CALC-SCNC: 3 MMOL/L — LOW (ref 5–17)
APPEARANCE UR: CLEAR — SIGNIFICANT CHANGE UP
APTT BLD: 27.7 SEC — SIGNIFICANT CHANGE UP (ref 27.5–35.5)
AST SERPL-CCNC: 24 U/L — SIGNIFICANT CHANGE UP (ref 15–37)
BASOPHILS # BLD AUTO: 0.06 K/UL — SIGNIFICANT CHANGE UP (ref 0–0.2)
BASOPHILS NFR BLD AUTO: 0.6 % — SIGNIFICANT CHANGE UP (ref 0–2)
BILIRUB SERPL-MCNC: 0.2 MG/DL — SIGNIFICANT CHANGE UP (ref 0.2–1.2)
BILIRUB UR-MCNC: NEGATIVE — SIGNIFICANT CHANGE UP
BUN SERPL-MCNC: 18 MG/DL — SIGNIFICANT CHANGE UP (ref 7–23)
CALCIUM SERPL-MCNC: 8.7 MG/DL — SIGNIFICANT CHANGE UP (ref 8.5–10.1)
CHLORIDE SERPL-SCNC: 109 MMOL/L — HIGH (ref 96–108)
CO2 SERPL-SCNC: 27 MMOL/L — SIGNIFICANT CHANGE UP (ref 22–31)
COLOR SPEC: YELLOW — SIGNIFICANT CHANGE UP
CREAT SERPL-MCNC: 0.76 MG/DL — SIGNIFICANT CHANGE UP (ref 0.5–1.3)
DIFF PNL FLD: NEGATIVE — SIGNIFICANT CHANGE UP
EOSINOPHIL # BLD AUTO: 0.37 K/UL — SIGNIFICANT CHANGE UP (ref 0–0.5)
EOSINOPHIL NFR BLD AUTO: 3.9 % — SIGNIFICANT CHANGE UP (ref 0–6)
GLUCOSE SERPL-MCNC: 70 MG/DL — SIGNIFICANT CHANGE UP (ref 70–99)
GLUCOSE UR QL: NEGATIVE MG/DL — SIGNIFICANT CHANGE UP
HCG SERPL-ACNC: <1 MIU/ML — SIGNIFICANT CHANGE UP
HCT VFR BLD CALC: 35.2 % — SIGNIFICANT CHANGE UP (ref 34.5–45)
HGB BLD-MCNC: 11.2 G/DL — LOW (ref 11.5–15.5)
IMM GRANULOCYTES NFR BLD AUTO: 0.3 % — SIGNIFICANT CHANGE UP (ref 0–1.5)
INR BLD: 0.82 RATIO — LOW (ref 0.88–1.16)
KETONES UR-MCNC: NEGATIVE — SIGNIFICANT CHANGE UP
LEUKOCYTE ESTERASE UR-ACNC: ABNORMAL
LIDOCAIN IGE QN: 124 U/L — SIGNIFICANT CHANGE UP (ref 73–393)
LYMPHOCYTES # BLD AUTO: 2.74 K/UL — SIGNIFICANT CHANGE UP (ref 1–3.3)
LYMPHOCYTES # BLD AUTO: 28.9 % — SIGNIFICANT CHANGE UP (ref 13–44)
MCHC RBC-ENTMCNC: 27.7 PG — SIGNIFICANT CHANGE UP (ref 27–34)
MCHC RBC-ENTMCNC: 31.8 GM/DL — LOW (ref 32–36)
MCV RBC AUTO: 87.1 FL — SIGNIFICANT CHANGE UP (ref 80–100)
MONOCYTES # BLD AUTO: 0.97 K/UL — HIGH (ref 0–0.9)
MONOCYTES NFR BLD AUTO: 10.2 % — SIGNIFICANT CHANGE UP (ref 2–14)
NEUTROPHILS # BLD AUTO: 5.31 K/UL — SIGNIFICANT CHANGE UP (ref 1.8–7.4)
NEUTROPHILS NFR BLD AUTO: 56.1 % — SIGNIFICANT CHANGE UP (ref 43–77)
NITRITE UR-MCNC: NEGATIVE — SIGNIFICANT CHANGE UP
PH UR: 6 — SIGNIFICANT CHANGE UP (ref 5–8)
PLATELET # BLD AUTO: 241 K/UL — SIGNIFICANT CHANGE UP (ref 150–400)
POTASSIUM SERPL-MCNC: 4 MMOL/L — SIGNIFICANT CHANGE UP (ref 3.5–5.3)
POTASSIUM SERPL-SCNC: 4 MMOL/L — SIGNIFICANT CHANGE UP (ref 3.5–5.3)
PROT SERPL-MCNC: 6.9 GM/DL — SIGNIFICANT CHANGE UP (ref 6–8.3)
PROT UR-MCNC: NEGATIVE MG/DL — SIGNIFICANT CHANGE UP
PROTHROM AB SERPL-ACNC: 9.7 SEC — LOW (ref 10.6–13.6)
RBC # BLD: 4.04 M/UL — SIGNIFICANT CHANGE UP (ref 3.8–5.2)
RBC # FLD: 13.1 % — SIGNIFICANT CHANGE UP (ref 10.3–14.5)
SODIUM SERPL-SCNC: 139 MMOL/L — SIGNIFICANT CHANGE UP (ref 135–145)
SP GR SPEC: 1.02 — SIGNIFICANT CHANGE UP (ref 1.01–1.02)
UROBILINOGEN FLD QL: NEGATIVE MG/DL — SIGNIFICANT CHANGE UP
WBC # BLD: 9.48 K/UL — SIGNIFICANT CHANGE UP (ref 3.8–10.5)
WBC # FLD AUTO: 9.48 K/UL — SIGNIFICANT CHANGE UP (ref 3.8–10.5)

## 2020-10-28 PROCEDURE — 96361 HYDRATE IV INFUSION ADD-ON: CPT

## 2020-10-28 PROCEDURE — 74177 CT ABD & PELVIS W/CONTRAST: CPT

## 2020-10-28 PROCEDURE — 96374 THER/PROPH/DIAG INJ IV PUSH: CPT | Mod: XU

## 2020-10-28 PROCEDURE — 85730 THROMBOPLASTIN TIME PARTIAL: CPT

## 2020-10-28 PROCEDURE — 84702 CHORIONIC GONADOTROPIN TEST: CPT

## 2020-10-28 PROCEDURE — 85610 PROTHROMBIN TIME: CPT

## 2020-10-28 PROCEDURE — 96375 TX/PRO/DX INJ NEW DRUG ADDON: CPT

## 2020-10-28 PROCEDURE — 99284 EMERGENCY DEPT VISIT MOD MDM: CPT | Mod: 25

## 2020-10-28 PROCEDURE — 85025 COMPLETE CBC W/AUTO DIFF WBC: CPT

## 2020-10-28 PROCEDURE — 83690 ASSAY OF LIPASE: CPT

## 2020-10-28 PROCEDURE — 81001 URINALYSIS AUTO W/SCOPE: CPT

## 2020-10-28 PROCEDURE — 99284 EMERGENCY DEPT VISIT MOD MDM: CPT

## 2020-10-28 PROCEDURE — 74177 CT ABD & PELVIS W/CONTRAST: CPT | Mod: 26

## 2020-10-28 PROCEDURE — 87086 URINE CULTURE/COLONY COUNT: CPT

## 2020-10-28 PROCEDURE — 86901 BLOOD TYPING SEROLOGIC RH(D): CPT

## 2020-10-28 PROCEDURE — 36415 COLL VENOUS BLD VENIPUNCTURE: CPT

## 2020-10-28 PROCEDURE — 86850 RBC ANTIBODY SCREEN: CPT

## 2020-10-28 PROCEDURE — 86900 BLOOD TYPING SEROLOGIC ABO: CPT

## 2020-10-28 PROCEDURE — 80053 COMPREHEN METABOLIC PANEL: CPT

## 2020-10-28 RX ORDER — SODIUM CHLORIDE 9 MG/ML
1000 INJECTION INTRAMUSCULAR; INTRAVENOUS; SUBCUTANEOUS ONCE
Refills: 0 | Status: COMPLETED | OUTPATIENT
Start: 2020-10-28 | End: 2020-10-28

## 2020-10-28 RX ORDER — KETOROLAC TROMETHAMINE 30 MG/ML
30 SYRINGE (ML) INJECTION ONCE
Refills: 0 | Status: DISCONTINUED | OUTPATIENT
Start: 2020-10-28 | End: 2020-10-28

## 2020-10-28 RX ORDER — ONDANSETRON 8 MG/1
4 TABLET, FILM COATED ORAL ONCE
Refills: 0 | Status: COMPLETED | OUTPATIENT
Start: 2020-10-28 | End: 2020-10-28

## 2020-10-28 RX ORDER — SOD SULF/SODIUM/NAHCO3/KCL/PEG
4000 SOLUTION, RECONSTITUTED, ORAL ORAL ONCE
Refills: 0 | Status: COMPLETED | OUTPATIENT
Start: 2020-10-28 | End: 2020-10-28

## 2020-10-28 RX ADMIN — Medication 30 MILLIGRAM(S): at 05:40

## 2020-10-28 RX ADMIN — SODIUM CHLORIDE 1000 MILLILITER(S): 9 INJECTION INTRAMUSCULAR; INTRAVENOUS; SUBCUTANEOUS at 05:01

## 2020-10-28 RX ADMIN — SODIUM CHLORIDE 1000 MILLILITER(S): 9 INJECTION INTRAMUSCULAR; INTRAVENOUS; SUBCUTANEOUS at 06:01

## 2020-10-28 RX ADMIN — ONDANSETRON 4 MILLIGRAM(S): 8 TABLET, FILM COATED ORAL at 05:01

## 2020-10-28 RX ADMIN — Medication 4000 MILLILITER(S): at 09:40

## 2020-10-28 RX ADMIN — SODIUM CHLORIDE 1000 MILLILITER(S): 9 INJECTION INTRAMUSCULAR; INTRAVENOUS; SUBCUTANEOUS at 09:01

## 2020-10-28 RX ADMIN — SODIUM CHLORIDE 1000 MILLILITER(S): 9 INJECTION INTRAMUSCULAR; INTRAVENOUS; SUBCUTANEOUS at 08:00

## 2020-10-28 RX ADMIN — Medication 30 MILLIGRAM(S): at 05:10

## 2020-10-28 NOTE — ED PROVIDER NOTE - PROGRESS NOTE DETAILS
discussed with dr finch who will be down to see pt, asks for another liter of fluid pt seen and cleared for dc by dr finch, f/u with dr soria and with ирина

## 2020-10-28 NOTE — ED PROVIDER NOTE - PSH
History of augmentation of both breasts  8/ 2014  History of sleeve gastrectomy  5/ 2012  S/P endoscopy  upper - 10/2019  S/P gastric bypass  01/2020

## 2020-10-28 NOTE — ED ADULT NURSE NOTE - CHPI ED NUR SYMPTOMS NEG
no burning urination/no abdominal distension/no blood in stool/no diarrhea/no dysuria/no vomiting/no chills/no hematuria/no fever

## 2020-10-28 NOTE — ED PROVIDER NOTE - CARE PROVIDER_API CALL
Edy Gonzalez (MD)  Gastroenterology; Internal Medicine  [address] [facility], [address] [place] [address]  Phone: [phone]  Fax: [phone]  Follow Up Time: 1-3 Days

## 2020-10-28 NOTE — ED ADULT TRIAGE NOTE - CHIEF COMPLAINT QUOTE
Pt presents to ER with c/o of abdominal pain and bloating.  Pt in RN on 3E.  Pt had gastric bypass sx in January and due to complications had to have sx for SBO and adhesions in March.  Pt reports not having a BM since Monday. Took Magnesium citrate and stool softners/lax with no relief.  Pt reports eating regularly, had one episode of Nausea.  Denies VD.

## 2020-10-28 NOTE — ED PROVIDER NOTE - OBJECTIVE STATEMENT
24 y/o female in ED c/o abd pain and constipation x 5 days.   pt states h/o gastric surgery by Dr Edwards and sbo.   pt denies any fever, HA, cp, sob, n/v/d.   tolerating PO.   no sick contacts or recent travel.   states took laxatives with some relief.

## 2020-10-28 NOTE — CONSULT NOTE ADULT - SUBJECTIVE AND OBJECTIVE BOX
HPI: Patient is an 24 yo F that  underwent laparoscopic conversion of sleeve gastrectomy to RNY gastric bypass (2020) due to chronic GERD, who subsequently had a diagnostic laparoscopy with lysis of adhesions secondary to small bowel obstruction (2020) who present's today with abdominal pain and constipation since . Patient took multiple laxatives, including Mg-Citrate, and had small bowel movement after enema without much improvement of her abdominal pain.       PAST MEDICAL & SURGICAL HISTORY:  History of asthma  as a child    History of anemia    Constipation    Migraines    History of obesity    Chronic GERD    Anxiety and depression    S/P gastric bypass  2020    S/P endoscopy  upper - 10/2019    History of sleeve gastrectomy  2012    History of augmentation of both breasts  2014        REVIEW OF SYSTEMS      General: Denies	    Skin/Breast:  Denies  	  Ophthalmologic: Denies  	  ENMT:	 Denies    Respiratory and Thorax: Denies  	  Cardiovascular:	 Denies    Abdominal pain & constipation     Genitourinary:	 Denies    Musculoskeletal:	 Denies    Neurological:	 Denies    Psychiatric:	 Denies    Hematology/Lymphatics:	 Denies    Endocrine:	 Denies    Allergic/Immunologic:	    MEDICATIONS  (STANDING):  polyethylene glycol/electrolyte Solution. 4000 milliLiter(s) Oral once  sodium chloride 0.9% Bolus 1000 milliLiter(s) IV Bolus once    MEDICATIONS  (PRN):      Allergies    penicillins (Unknown)    Intolerances        SOCIAL HISTORY:    FAMILY HISTORY:  Family history of hyperlipidemia  father    Family history of depression  mother    Family history of anxiety disorder  mother        Vital Signs Last 24 Hrs  T(C): 36.7 (28 Oct 2020 04:28), Max: 36.7 (28 Oct 2020 04:28)  T(F): 98 (28 Oct 2020 04:28), Max: 98 (28 Oct 2020 04:28)  HR: 64 (28 Oct 2020 04:28) (64 - 64)  BP: 133/89 (28 Oct 2020 04:28) (133/89 - 133/89)  BP(mean): --  RR: 18 (28 Oct 2020 04:28) (18 - 18)  SpO2: 98% (28 Oct 2020 04:28) (98% - 98%)    PHYSICAL EXAM:      Constitutional: NAD, AAOx3    Respiratory: Non-labored    Cardiovascular: +S1+S2     Gastrointestinal: Soft, NT, ND      LABS:                        11.2   9.48  )-----------( 241      ( 28 Oct 2020 04:49 )             35.2     10-28    139  |  109<H>  |  18  ----------------------------<  70  4.0   |  27  |  0.76    Ca    8.7      28 Oct 2020 04:49    TPro  6.9  /  Alb  3.4  /  TBili  0.2  /  DBili  x   /  AST  24  /  ALT  28  /  AlkPhos  56  10-28    PT/INR - ( 28 Oct 2020 04:49 )   PT: 9.7 sec;   INR: 0.82 ratio         PTT - ( 28 Oct 2020 04:49 )  PTT:27.7 sec  Urinalysis Basic - ( 28 Oct 2020 04:49 )    Color: Yellow / Appearance: Clear / S.025 / pH: x  Gluc: x / Ketone: Negative  / Bili: Negative / Urobili: Negative mg/dL   Blood: x / Protein: Negative mg/dL / Nitrite: Negative   Leuk Esterase: Trace / RBC: 0-2 /HPF / WBC 3-5   Sq Epi: x / Non Sq Epi: Few / Bacteria: Occasional        RADIOLOGY & ADDITIONAL STUDIES:      EXAM:  CT ABDOMEN AND PELVIS OC IC                            PROCEDURE DATE:  10/28/2020          INTERPRETATION:  CLINICAL HISTORY: Abdominal pain. Constipation.    TECHNIQUE:  CT scan of the abdomen and pelvis with IV contrast.  Transaxial images were acquired from the domes of the diaphragm to the symphysis pubis with intravenous contrast. Oral contrast was administered. Coronal and sagittal images were also provided from the transaxial source data. 90mLs of Omnipaque 350 was administered intravenously without complication and 10 mLs was discarded.    COMPARISON: CT of the abdomen and pelvis from 2020    FINDINGS:  The heart is not enlarged. There is new patchy groundglass attenuation in the right lung base.    Patient is again status post Brittany-en-Y gastric bypass surgery. The large and small bowel are normal in caliber without obstruction. There is no abnormal mesenteric swirling to suggest the presence of an internal hernia. There is no free intraperitoneal air or abdominal abscess.  The appendix is normal. There is no abnormal bowel wall thickening or inflammatory change. Moderate amount of retained fecal material seen through the colon.    The liver, gallbladder, spleen, pancreas and adrenal glands are unremarkable aside from trace amount of nonspecific periportal edema..  The kidneys enhance symmetrically without hydronephrosis.    The abdominal aorta is normal in caliber. There is no retroperitoneal, pelvic or inguinal adenopathy. There is no ascites.    The urinary bladder is unremarkable. The uterus and adnexal structures are within normal limits.    The visualized osseous structures demonstrate no acute abnormality. Bilateral pars interarticularis defects at L5, unchanged. Bilateral subglandular breast implants again noted.    IMPRESSION:  Status post gastric bypass surgery. No bowel obstruction, wall thickening or inflammatory change.    New nonspecific groundglass attenuation right lung base which could be due to partial atelectasis, infection or inflammatory process. Please correlate clinically.            MICHI DIEGO MD; Attending Radiologist  This document has been electronically signed. Oct 28 2020  7:36AM

## 2020-10-28 NOTE — ED ADULT NURSE NOTE - OBJECTIVE STATEMENT
Patient A&Ox4 ambulatory to ED c/o abdominal pain.  Patient reports onset of intermittent  abdominal pain since saturday, patient notes nausea.  Patient reports last BM was monday and feels constipated.  Patient has been taking mag citrate without relief.  Patient had gastric bypass surgery in january.  Patient had SBO and went for emergent surgery in march.  Patient denies SOB, chest pain, vomiting, diarrhea, fever/chills.

## 2020-10-28 NOTE — CONSULT NOTE ADULT - PROBLEM SELECTOR RECOMMENDATION 9
- Patient cleared for discharge home with Golytely bowel prep for constipation. Patient has been instructed not to combine with any other laxative.  - Patient can follow-up with Dr. Gonzalez (GI) for problems with constipation.  - Patient also instructed to get adequate hydration and to use Metamucil daily  - Patient can follow-up in the office within 2 weeks.

## 2020-10-28 NOTE — ED PROVIDER NOTE - PATIENT PORTAL LINK FT
You can access the FollowMyHealth Patient Portal offered by [facility] by registering at the following website: [email or web address]. By joining [employer]’s FollowMyHealth portal, you will also be able to view your health information using other applications (apps) compatible with our system.

## 2020-10-29 LAB
CULTURE RESULTS: SIGNIFICANT CHANGE UP
SPECIMEN SOURCE: SIGNIFICANT CHANGE UP

## 2020-11-03 ENCOUNTER — APPOINTMENT (OUTPATIENT)
Dept: GASTROENTEROLOGY | Facility: CLINIC | Age: 25
End: 2020-11-03
Payer: COMMERCIAL

## 2020-11-03 VITALS
BODY MASS INDEX: 25.71 KG/M2 | DIASTOLIC BLOOD PRESSURE: 94 MMHG | TEMPERATURE: 98 F | HEIGHT: 66 IN | HEART RATE: 72 BPM | WEIGHT: 160 LBS | SYSTOLIC BLOOD PRESSURE: 132 MMHG

## 2020-11-03 PROCEDURE — 99072 ADDL SUPL MATRL&STAF TM PHE: CPT

## 2020-11-03 PROCEDURE — 99203 OFFICE O/P NEW LOW 30 MIN: CPT

## 2020-11-03 NOTE — ASSESSMENT
[FreeTextEntry1] : 26 yo female s/p gastric bypass with chronic constipation. Will obtain labs. Sitz marker study. Trial of Linzess 145 micrograms. Follow up in three weeks.

## 2020-11-03 NOTE — HISTORY OF PRESENT ILLNESS
[de-identified] : Ms. ARSLAN NATARAJAN is a 25 year old female with history of gastric sleeve converted to Brittany-en-Y gastric bypass several years ago for GERD. Patient has had issues with a small bowel obstruction in the past. Patient has had long-standing issues with constipation which have increased since her prior surgery. Patient ended up in the emergency room her CT scan did not show evidence of obstruction. There has been no nausea vomiting, weight loss.\par

## 2020-12-03 ENCOUNTER — APPOINTMENT (OUTPATIENT)
Dept: GASTROENTEROLOGY | Facility: CLINIC | Age: 25
End: 2020-12-03

## 2020-12-10 ENCOUNTER — OUTPATIENT (OUTPATIENT)
Dept: OUTPATIENT SERVICES | Facility: HOSPITAL | Age: 25
LOS: 1 days | End: 2020-12-10
Payer: COMMERCIAL

## 2020-12-10 DIAGNOSIS — Z98.890 OTHER SPECIFIED POSTPROCEDURAL STATES: Chronic | ICD-10-CM

## 2020-12-10 DIAGNOSIS — Z90.3 ACQUIRED ABSENCE OF STOMACH [PART OF]: Chronic | ICD-10-CM

## 2020-12-10 DIAGNOSIS — Z11.59 ENCOUNTER FOR SCREENING FOR OTHER VIRAL DISEASES: ICD-10-CM

## 2020-12-10 DIAGNOSIS — Z98.84 BARIATRIC SURGERY STATUS: Chronic | ICD-10-CM

## 2020-12-10 LAB — SARS-COV-2 RNA SPEC QL NAA+PROBE: SIGNIFICANT CHANGE UP

## 2020-12-10 PROCEDURE — U0003: CPT

## 2020-12-11 DIAGNOSIS — Z11.59 ENCOUNTER FOR SCREENING FOR OTHER VIRAL DISEASES: ICD-10-CM

## 2020-12-21 ENCOUNTER — OUTPATIENT (OUTPATIENT)
Dept: OUTPATIENT SERVICES | Facility: HOSPITAL | Age: 25
LOS: 1 days | End: 2020-12-21
Payer: COMMERCIAL

## 2020-12-21 DIAGNOSIS — Z20.828 CONTACT WITH AND (SUSPECTED) EXPOSURE TO OTHER VIRAL COMMUNICABLE DISEASES: ICD-10-CM

## 2020-12-21 DIAGNOSIS — Z98.890 OTHER SPECIFIED POSTPROCEDURAL STATES: Chronic | ICD-10-CM

## 2020-12-21 DIAGNOSIS — Z90.3 ACQUIRED ABSENCE OF STOMACH [PART OF]: Chronic | ICD-10-CM

## 2020-12-21 DIAGNOSIS — Z98.84 BARIATRIC SURGERY STATUS: Chronic | ICD-10-CM

## 2020-12-21 LAB — SARS-COV-2 RNA SPEC QL NAA+PROBE: SIGNIFICANT CHANGE UP

## 2020-12-21 PROCEDURE — U0003: CPT

## 2020-12-22 DIAGNOSIS — Z20.828 CONTACT WITH AND (SUSPECTED) EXPOSURE TO OTHER VIRAL COMMUNICABLE DISEASES: ICD-10-CM

## 2021-01-02 ENCOUNTER — OUTPATIENT (OUTPATIENT)
Dept: OUTPATIENT SERVICES | Facility: HOSPITAL | Age: 26
LOS: 1 days | End: 2021-01-02
Payer: COMMERCIAL

## 2021-01-02 DIAGNOSIS — Z98.890 OTHER SPECIFIED POSTPROCEDURAL STATES: Chronic | ICD-10-CM

## 2021-01-02 DIAGNOSIS — Z20.828 CONTACT WITH AND (SUSPECTED) EXPOSURE TO OTHER VIRAL COMMUNICABLE DISEASES: ICD-10-CM

## 2021-01-02 DIAGNOSIS — Z90.3 ACQUIRED ABSENCE OF STOMACH [PART OF]: Chronic | ICD-10-CM

## 2021-01-02 DIAGNOSIS — Z98.84 BARIATRIC SURGERY STATUS: Chronic | ICD-10-CM

## 2021-01-02 LAB — SARS-COV-2 RNA SPEC QL NAA+PROBE: SIGNIFICANT CHANGE UP

## 2021-01-02 PROCEDURE — U0003: CPT

## 2021-01-02 PROCEDURE — U0005: CPT

## 2021-01-02 PROCEDURE — C9803: CPT

## 2021-01-03 DIAGNOSIS — Z20.828 CONTACT WITH AND (SUSPECTED) EXPOSURE TO OTHER VIRAL COMMUNICABLE DISEASES: ICD-10-CM

## 2021-01-09 ENCOUNTER — EMERGENCY (EMERGENCY)
Facility: HOSPITAL | Age: 26
LOS: 0 days | Discharge: HOME | End: 2021-01-09
Attending: EMERGENCY MEDICINE
Payer: COMMERCIAL

## 2021-01-09 VITALS
DIASTOLIC BLOOD PRESSURE: 75 MMHG | SYSTOLIC BLOOD PRESSURE: 122 MMHG | OXYGEN SATURATION: 98 % | HEART RATE: 98 BPM | RESPIRATION RATE: 18 BRPM | TEMPERATURE: 98 F

## 2021-01-09 VITALS — HEIGHT: 66 IN | WEIGHT: 149.91 LBS

## 2021-01-09 DIAGNOSIS — Z90.3 ACQUIRED ABSENCE OF STOMACH [PART OF]: Chronic | ICD-10-CM

## 2021-01-09 DIAGNOSIS — H92.21 OTORRHAGIA, RIGHT EAR: ICD-10-CM

## 2021-01-09 DIAGNOSIS — Z98.84 BARIATRIC SURGERY STATUS: Chronic | ICD-10-CM

## 2021-01-09 DIAGNOSIS — Z88.0 ALLERGY STATUS TO PENICILLIN: ICD-10-CM

## 2021-01-09 DIAGNOSIS — Z98.890 OTHER SPECIFIED POSTPROCEDURAL STATES: Chronic | ICD-10-CM

## 2021-01-09 DIAGNOSIS — S00.411A ABRASION OF RIGHT EAR, INITIAL ENCOUNTER: ICD-10-CM
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
## 2021-01-09 PROCEDURE — 99282 EMERGENCY DEPT VISIT SF MDM: CPT

## 2021-01-09 NOTE — ED STATDOCS - OBJECTIVE STATEMENT
26 y/o female with PMHx of anxiety, depression, GERD, anemia, asthma, obesity, migraines presents to the ED c/o bleeding from her right ear. Pt states it started bleeding after she showered this morning. Was a bright red. has no other complaints or pain.

## 2021-01-09 NOTE — ED ADULT NURSE NOTE - OBJECTIVE STATEMENT
Pt presents to ED ambulatory c/o right ear drainage. Pt states she hit her head on her dresser and used a q-tip earlier and her ear started bleeding. Pt c/o mild headache, took advil PTA. Bleeding controlled PTA.

## 2021-01-13 NOTE — ASU PREOP CHECKLIST - NEEDLE GAUGE
COVID-19 PCR test completed. Patient handout For Patients Who Have Been Tested for Covid-19 (Coronavirus) was given to the patient, which includes test result notification process.    
20

## 2021-01-25 NOTE — ED STATDOCS - CROS ED SKIN ALL NEG
Patient's name: Simi Lamar STAFF NAME: Teofilo Jiang  : 2001    Date of service: 2021  Session no.: 1            Purpose: Treatment Plan        Intervention: Patient engaged in the treatment plan process. Patient reports being in a safe space, however, patient does not always feel safe in her neighborhood. Patient thankful to be in touch with a trauma . Patient reports that she needs to find a PCP, a dentist, and needs a job. Patient reports working with [employer]. Writer will follow-up with patient by the end of the week to discuss her social service options.         Mental status:    Pt's attitude was cooperative, euthymic mood, w/ normal affect.  Pt was oriented to person, place, time and context.  Concentration appeared normal.  Immediate, recent, and remote memory was intact.  Speech was normal.  Thoughts appeared linear and coherent.  Pt denied SI, HI, and psychosis. Judgment fair w/ fair insight.        Plan: Follow-up with patient to further assess her social service needs.            
- - -

## 2021-02-11 ENCOUNTER — RESULT CHARGE (OUTPATIENT)
Age: 26
End: 2021-02-11

## 2021-02-11 ENCOUNTER — APPOINTMENT (OUTPATIENT)
Dept: OBGYN | Facility: CLINIC | Age: 26
End: 2021-02-11
Payer: COMMERCIAL

## 2021-02-11 VITALS
BODY MASS INDEX: 24.11 KG/M2 | SYSTOLIC BLOOD PRESSURE: 110 MMHG | WEIGHT: 150 LBS | DIASTOLIC BLOOD PRESSURE: 70 MMHG | HEIGHT: 66 IN | TEMPERATURE: 98.7 F

## 2021-02-11 DIAGNOSIS — Z92.29 PERSONAL HISTORY OF OTHER DRUG THERAPY: ICD-10-CM

## 2021-02-11 LAB
BILIRUB UR QL STRIP: NORMAL
CLARITY UR: CLEAR
COLLECTION METHOD: NORMAL
GLUCOSE UR-MCNC: NORMAL
HCG UR QL: 0.2 EU/DL
HGB UR QL STRIP.AUTO: NORMAL
KETONES UR-MCNC: NORMAL
LEUKOCYTE ESTERASE UR QL STRIP: NORMAL
NITRITE UR QL STRIP: NORMAL
PH UR STRIP: 5.5
PROT UR STRIP-MCNC: NORMAL
SP GR UR STRIP: 1.01

## 2021-02-11 PROCEDURE — 99072 ADDL SUPL MATRL&STAF TM PHE: CPT

## 2021-02-11 PROCEDURE — 99395 PREV VISIT EST AGE 18-39: CPT

## 2021-02-11 PROCEDURE — 81003 URINALYSIS AUTO W/O SCOPE: CPT | Mod: QW

## 2021-02-11 NOTE — PHYSICAL EXAM
[Appropriately responsive] : appropriately responsive [Alert] : alert [No Acute Distress] : no acute distress [No Lymphadenopathy] : no lymphadenopathy [Regular Rate Rhythm] : regular rate rhythm [No Murmurs] : no murmurs [Clear to Auscultation B/L] : clear to auscultation bilaterally [Soft] : soft [Non-tender] : non-tender [Non-distended] : non-distended [No HSM] : No HSM [No Lesions] : no lesions [No Mass] : no mass [Oriented x3] : oriented x3 [FreeTextEntry7] : multiple lap scars [Examination Of The Breasts] : a normal appearance [No Masses] : no breast masses were palpable [Labia Majora] : normal [Labia Minora] : normal [Normal] : normal [Uterine Adnexae] : normal

## 2021-02-11 NOTE — HISTORY OF PRESENT ILLNESS
[FreeTextEntry1] : 26 yo here for av she is doing well , SHe had  her 2 shots and is doing well. She is still on the wooryst21 and doing well. wants renewal. \par She was complining of burning and freq. for 2 days and she took cystec today and feels better. \par Had gastric bypsss after having the sleeve.  [Currently Active] : currently active [Men] : men [Vaginal] : vaginal [No] : No

## 2021-02-22 ENCOUNTER — EMERGENCY (EMERGENCY)
Facility: HOSPITAL | Age: 26
LOS: 0 days | Discharge: HOME | End: 2021-02-23
Attending: EMERGENCY MEDICINE
Payer: COMMERCIAL

## 2021-02-22 ENCOUNTER — LABORATORY RESULT (OUTPATIENT)
Age: 26
End: 2021-02-22

## 2021-02-22 VITALS
TEMPERATURE: 99 F | RESPIRATION RATE: 18 BRPM | SYSTOLIC BLOOD PRESSURE: 126 MMHG | HEART RATE: 84 BPM | DIASTOLIC BLOOD PRESSURE: 94 MMHG | OXYGEN SATURATION: 98 %

## 2021-02-22 VITALS — HEIGHT: 66 IN | WEIGHT: 164.91 LBS

## 2021-02-22 DIAGNOSIS — S61.230A PUNCTURE WOUND WITHOUT FOREIGN BODY OF RIGHT INDEX FINGER WITHOUT DAMAGE TO NAIL, INITIAL ENCOUNTER: ICD-10-CM

## 2021-02-22 DIAGNOSIS — J45.909 UNSPECIFIED ASTHMA, UNCOMPLICATED: ICD-10-CM

## 2021-02-22 DIAGNOSIS — Z98.890 OTHER SPECIFIED POSTPROCEDURAL STATES: Chronic | ICD-10-CM

## 2021-02-22 DIAGNOSIS — Z77.21 CONTACT WITH AND (SUSPECTED) EXPOSURE TO POTENTIALLY HAZARDOUS BODY FLUIDS: ICD-10-CM

## 2021-02-22 DIAGNOSIS — K21.9 GASTRO-ESOPHAGEAL REFLUX DISEASE WITHOUT ESOPHAGITIS: ICD-10-CM
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
## 2021-02-22 DIAGNOSIS — Z98.84 BARIATRIC SURGERY STATUS: Chronic | ICD-10-CM

## 2021-02-22 DIAGNOSIS — Z90.3 ACQUIRED ABSENCE OF STOMACH [PART OF]: Chronic | ICD-10-CM

## 2021-02-22 DIAGNOSIS — Z88.0 ALLERGY STATUS TO PENICILLIN: ICD-10-CM

## 2021-02-22 DIAGNOSIS — Z81.8 FAMILY HISTORY OF OTHER MENTAL AND BEHAVIORAL DISORDERS: ICD-10-CM

## 2021-02-22 DIAGNOSIS — Z98.84 BARIATRIC SURGERY STATUS: ICD-10-CM

## 2021-02-22 DIAGNOSIS — F41.9 ANXIETY DISORDER, UNSPECIFIED: ICD-10-CM

## 2021-02-22 DIAGNOSIS — Z86.69 PERSONAL HISTORY OF OTHER DISEASES OF THE NERVOUS SYSTEM AND SENSE ORGANS: ICD-10-CM

## 2021-02-22 DIAGNOSIS — F32.9 MAJOR DEPRESSIVE DISORDER, SINGLE EPISODE, UNSPECIFIED: ICD-10-CM
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
## 2021-02-22 PROCEDURE — 99282 EMERGENCY DEPT VISIT SF MDM: CPT

## 2021-02-22 PROCEDURE — 99053 MED SERV 10PM-8AM 24 HR FAC: CPT

## 2021-02-22 NOTE — ED STATDOCS - NS ED ROS FT
Review of Systems:  	•	CONSTITUTIONAL: no fever, needle stick  	•	SKIN: no rash  	•	RESPIRATORY: no shortness of breath    	•	GI:  , no vomiting    	•	PSYSCHIATRIC: appropriate concern about symptoms

## 2021-02-22 NOTE — ED STATDOCS - NSFOLLOWUPINSTRUCTIONS_ED_ALL_ED_FT
FOLLOW UP WITH PMD WITHIN 1-2DAYS, CALL TO MAKE APPOINTMENT  COME BACK TO ED IF YOUR CONDITION WORSENS OR IF YOU DEVELOP FEVER GREATER THAN 100.4F, CHEST PAIN,  SHORTNESS OF BREATH OR ANY OTHER SYMPTOMS CONCERNING TO YOU  TAKE TYLENOL (ACETAMINOPHEN) 650 MG EVERY 6 HOURS AS NEEDED FOR PAIN    Employee Health will call to update you on results    Needle Stick Injuries    WHAT YOU NEED TO KNOW:    Needle stick injuries usually happen to healthcare workers in hospitals, clinics, and labs. Needle stick injuries can also happen at home or in the community if needles are not discarded properly. Used needles may have blood or body fluids that carry HIV, the hepatitis B virus (HBV), or the hepatitis C virus (HCV). The virus can spread to a person who gets pricked by a needle used on an infected person.    DISCHARGE INSTRUCTIONS:    Ways that needle stick injuries can occur: Needle stick injuries usually happen by accident. [place] may cause injury to you or to someone else if they were not properly discarded after use. An injury can also occur if you do not use gloves to protect your hands while you work with needles.     Treatment that may be given for needle stick injuries: Postexposure prophylaxis (PEP) may be needed. PEP is treatment that may protect a person from infection after exposure to another person's body fluids. PEP may be needed if the person whose fluids you were exposed to has a known infection. Do not donate blood, organs, tissues, or semen until your follow-up is completed at 6 months.    PEP for HBV may include HBV vaccinations or medicine to prevent HBV. This treatment works best if started within 24 hours of exposure.       PEP for HIV may include 2 or 3 types of medicine to prevent HIV. This treatment works best if started within 72 hours of exposure. Continue treatment for 4 weeks. Practice safe sex to prevent spreading HIV and to prevent pregnancy during the follow-up period. If you are breastfeeding, your healthcare provider may recommend that you stop. Ask your healthcare provider if you can breastfeed.       PEP for HCV is not available. You will need to be tested for HCV and treated if you were infected.    Follow up with your healthcare provider as directed: You will need more blood tests. You will also need to make sure your medicines are working. PEP for HIV often causes side effects. Talk with your healthcare provider about your symptoms. He will need to make sure you are taking the medicine correctly. Write down your questions so you remember to ask them during your visits.    Prevent needle stick injuries:     Always use gloves when you handle needles that are exposed to blood or other body fluids. You may want to use 2 pairs of gloves for extra protection.      Do not recap needles after use. Recapping needles increases your risk for a needle stick.      Throw away needles in a safe container. A hard container with a lid may prevent accidental needle sticks.

## 2021-02-22 NOTE — ED STATDOCS - OBJECTIVE STATEMENT
Pertinent HPI/PMH/PSH/FHx/SHx and Review of Systems contained within  HPI:  Patient p/w CC needlstick today, new onset. pt is an RN in . Pt was throwing away dirty insulin needle in sharps container and stuck her right 2nd digit which started bleeding. pts vaccines utd & states she has tested negative for hiv. source pt's chart check & pts never been tested for hiv or hepatitis.   PMH/PSH relevant for:  anxiety, depression, GERD, anemia, asthma, obesity, migraines  ROS negative for: fever, SOB, vomitting  FamilyHx and SocialHx not otherwise contributory

## 2021-02-22 NOTE — ED ADULT TRIAGE NOTE - CHIEF COMPLAINT QUOTE
Pt presents to ER s/p needle stick. Pt was throwing away dirty needle in sharps container and stuck her right pointer finger

## 2021-02-22 NOTE — ED STATDOCS - CLINICAL SUMMARY MEDICAL DECISION MAKING FREE TEXT BOX
needle stick r 2nd digit distal to dip dorsal region. 0.003 % of yimi HIV Or, 1 in [id number]. PEP treatment optional but not recommended, according to studies. offered PEP HIV ppx but pt refused

## 2021-02-22 NOTE — ED STATDOCS - FAMILY HISTORY
Mother  Still living? Unknown  Family history of anxiety disorder, Age at diagnosis: Age Unknown  Family history of depression, Age at diagnosis: Age Unknown     Father  Still living? Unknown  Family history of hyperlipidemia, Age at diagnosis: Age Unknown

## 2021-02-22 NOTE — ED STATDOCS - PHYSICAL EXAMINATION
*GEN: No acute distress, well appearing   *HEAD: Normocephalic, Atraumatic  *EYES/NOSE: b/l Pupils symmetric & Reactive to ligth, EOMI b/l  *THROAT: airway patent, moist mucous membranes  *NECK: Neck supple  *PULMONARY: No Respiratory distress, symmetric b/l chest rise  *CARDIAC: s1s2, regular rhythm   *ABDOMEN:  Non Tender, Non Distended, soft, no guarding, no rebound, no masses   *BACK: no CVA tenderness, No midline vertebral tenderness to palpation   *EXTREMITIES: symmetric pulses, 2+ DP & radial pulses, no cyanosis, no edema   *SKIN: needle stick r 2nd digit distal to dip dorsal region  *NEUROLOGIC: alert,  full active & passive ROM in all 4 extremities, normal gait  *PSYCH: appropriate concern about symptoms, pleasant

## 2021-02-22 NOTE — ED STATDOCS - PATIENT PORTAL LINK FT
You can access the FollowMyHealth Patient Portal offered by [facility] by registering at the following website: [email or web address]. By joining [employer]’s FollowMyHealth portal, you will also be able to view your health information using other applications (apps) compatible with our system.

## 2021-02-23 NOTE — ED ADULT NURSE NOTE - HIV OFFER
INR- 2.0,  Pt notified to continue same dose and re test in 2 weeks.    Previously Declined (within the last year)

## 2021-02-23 NOTE — ED ADULT NURSE NOTE - OBJECTIVE STATEMENT
Pt presents to ER s/p needle stick.  Patient is an employee at .  Pt was throwing away dirty needle in sharps container and stuck her right pointer finger.

## 2021-03-25 ENCOUNTER — OUTPATIENT (OUTPATIENT)
Dept: OUTPATIENT SERVICES | Facility: HOSPITAL | Age: 26
LOS: 1 days | Discharge: HOME | End: 2021-03-25

## 2021-03-25 DIAGNOSIS — Z90.3 ACQUIRED ABSENCE OF STOMACH [PART OF]: Chronic | ICD-10-CM

## 2021-03-25 DIAGNOSIS — D64.9 ANEMIA, UNSPECIFIED: ICD-10-CM

## 2021-03-25 DIAGNOSIS — Z98.890 OTHER SPECIFIED POSTPROCEDURAL STATES: Chronic | ICD-10-CM

## 2021-03-25 DIAGNOSIS — Z98.84 BARIATRIC SURGERY STATUS: Chronic | ICD-10-CM

## 2021-03-26 ENCOUNTER — RX RENEWAL (OUTPATIENT)
Age: 26
End: 2021-03-26

## 2021-03-30 ENCOUNTER — APPOINTMENT (OUTPATIENT)
Dept: HEMATOLOGY ONCOLOGY | Facility: CLINIC | Age: 26
End: 2021-03-30

## 2021-03-31 ENCOUNTER — APPOINTMENT (OUTPATIENT)
Dept: GASTROENTEROLOGY | Facility: CLINIC | Age: 26
End: 2021-03-31

## 2021-05-18 ENCOUNTER — NON-APPOINTMENT (OUTPATIENT)
Age: 26
End: 2021-05-18

## 2021-05-18 ENCOUNTER — APPOINTMENT (OUTPATIENT)
Dept: OBGYN | Facility: CLINIC | Age: 26
End: 2021-05-18
Payer: COMMERCIAL

## 2021-05-18 ENCOUNTER — LABORATORY RESULT (OUTPATIENT)
Age: 26
End: 2021-05-18

## 2021-05-18 VITALS
DIASTOLIC BLOOD PRESSURE: 72 MMHG | SYSTOLIC BLOOD PRESSURE: 120 MMHG | BODY MASS INDEX: 25.11 KG/M2 | HEIGHT: 67 IN | WEIGHT: 160 LBS

## 2021-05-18 LAB
BILIRUB UR QL STRIP: NORMAL
GLUCOSE UR-MCNC: NORMAL
HCG UR QL: 0.2 EU/DL
HGB UR QL STRIP.AUTO: NORMAL
KETONES UR-MCNC: NORMAL
LEUKOCYTE ESTERASE UR QL STRIP: NORMAL
NITRITE UR QL STRIP: NORMAL
PH UR STRIP: 6
PROT UR STRIP-MCNC: NORMAL
SP GR UR STRIP: 1.01

## 2021-05-18 PROCEDURE — 99213 OFFICE O/P EST LOW 20 MIN: CPT

## 2021-05-18 PROCEDURE — 81003 URINALYSIS AUTO W/O SCOPE: CPT | Mod: QW

## 2021-05-18 PROCEDURE — 99072 ADDL SUPL MATRL&STAF TM PHE: CPT

## 2021-05-18 NOTE — DISCUSSION/SUMMARY
[FreeTextEntry1] : 24 YO PATIENT PRESENTS TO OFFICE TODAY WITH COMPLAINTS OF UTI SYMPTOMS \par PATIENT STATES FEELINGS OF DYSURIA/ URGENCY STARTED 5 DAYS AGO\par PATIENT DENIES HEMATURIA \par \par SURESWAB COLLECTED- STD TESTING DESIRED \par URINE CULTURE SENT \par URINE DIP STICK IN OFFICE TODAY\par PELVIC EXAM WNL\par \par RX MACROBID AND DIFLUCAN SENT TO PHARMACY WITH INSTRUCTIONS ON USE \par PATIENT EDUCATED ON BLADDER HYGIENE \par \par ALL QUESTIONS ANSWERED TO PATIENT SATISFACTION \par \par RTO IN FOR ANNUAL OR PRN\par

## 2021-05-18 NOTE — COUNSELING
[Nutrition/ Exercise/ Weight Management] : nutrition, exercise, weight management [Body Image] : body image [Vitamins/Supplements] : vitamins/supplements [Medication Management] : medication management

## 2021-05-19 LAB
APPEARANCE: CLEAR
BILIRUBIN URINE: NEGATIVE
BLOOD URINE: NEGATIVE
COLOR: YELLOW
GLUCOSE QUALITATIVE U: NEGATIVE
KETONES URINE: NEGATIVE
LEUKOCYTE ESTERASE URINE: NEGATIVE
NITRITE URINE: NEGATIVE
PH URINE: 6.5
PROTEIN URINE: NEGATIVE
SPECIFIC GRAVITY URINE: 1
UROBILINOGEN URINE: NORMAL

## 2021-09-27 ENCOUNTER — APPOINTMENT (OUTPATIENT)
Dept: OBGYN | Facility: CLINIC | Age: 26
End: 2021-09-27
Payer: COMMERCIAL

## 2021-09-27 VITALS
WEIGHT: 160 LBS | DIASTOLIC BLOOD PRESSURE: 60 MMHG | BODY MASS INDEX: 25.11 KG/M2 | HEIGHT: 67 IN | SYSTOLIC BLOOD PRESSURE: 102 MMHG

## 2021-09-27 DIAGNOSIS — N94.6 DYSMENORRHEA, UNSPECIFIED: ICD-10-CM

## 2021-09-27 PROCEDURE — 99213 OFFICE O/P EST LOW 20 MIN: CPT

## 2021-09-27 RX ORDER — FLUCONAZOLE 150 MG/1
150 TABLET ORAL
Qty: 1 | Refills: 2 | Status: COMPLETED | COMMUNITY
Start: 2021-05-18 | End: 2021-09-27

## 2021-09-27 RX ORDER — NITROFURANTOIN (MONOHYDRATE/MACROCRYSTALS) 25; 75 MG/1; MG/1
100 CAPSULE ORAL TWICE DAILY
Qty: 10 | Refills: 0 | Status: COMPLETED | COMMUNITY
Start: 2021-05-18 | End: 2021-09-27

## 2021-09-27 RX ORDER — NITROFURANTOIN (MONOHYDRATE/MACROCRYSTALS) 25; 75 MG/1; MG/1
100 CAPSULE ORAL
Qty: 14 | Refills: 0 | Status: COMPLETED | COMMUNITY
Start: 2020-07-23 | End: 2021-09-27

## 2021-09-27 RX ORDER — LINACLOTIDE 145 UG/1
145 CAPSULE, GELATIN COATED ORAL
Qty: 30 | Refills: 4 | Status: COMPLETED | COMMUNITY
Start: 2020-11-03 | End: 2021-09-27

## 2021-09-27 RX ORDER — CLONIDINE HYDROCHLORIDE 0.1 MG/1
0.1 TABLET ORAL
Qty: 90 | Refills: 0 | Status: COMPLETED | COMMUNITY
Start: 2017-08-21 | End: 2021-09-27

## 2021-09-27 NOTE — PHYSICAL EXAM
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

## 2021-09-27 NOTE — HISTORY OF PRESENT ILLNESS
[FreeTextEntry1] : had bariatric surgery and is anemic [TextBox_4] : pt on blisovi and doing well. SHe has lite menses, didn’t have covid did have vaccine.  [Currently Active] : currently active [Men] : men [Vaginal] : vaginal [No] : No

## 2021-09-30 DIAGNOSIS — N39.0 URINARY TRACT INFECTION, SITE NOT SPECIFIED: ICD-10-CM

## 2021-09-30 DIAGNOSIS — Z87.42 PERSONAL HISTORY OF OTHER DISEASES OF THE FEMALE GENITAL TRACT: ICD-10-CM

## 2021-09-30 DIAGNOSIS — Z82.49 FAMILY HISTORY OF ISCHEMIC HEART DISEASE AND OTHER DISEASES OF THE CIRCULATORY SYSTEM: ICD-10-CM

## 2021-09-30 DIAGNOSIS — R87.612 LOW GRADE SQUAMOUS INTRAEPITHELIAL LESION ON CYTOLOGIC SMEAR OF CERVIX (LGSIL): ICD-10-CM

## 2021-09-30 DIAGNOSIS — R39.9 UNSPECIFIED SYMPTOMS AND SIGNS INVOLVING THE GENITOURINARY SYSTEM: ICD-10-CM

## 2021-09-30 DIAGNOSIS — N89.8 OTHER SPECIFIED NONINFLAMMATORY DISORDERS OF VAGINA: ICD-10-CM

## 2021-10-08 ENCOUNTER — OUTPATIENT (OUTPATIENT)
Dept: OUTPATIENT SERVICES | Facility: HOSPITAL | Age: 26
LOS: 1 days | Discharge: HOME | End: 2021-10-08

## 2021-10-08 DIAGNOSIS — Z90.3 ACQUIRED ABSENCE OF STOMACH [PART OF]: Chronic | ICD-10-CM

## 2021-10-08 DIAGNOSIS — Z98.84 BARIATRIC SURGERY STATUS: Chronic | ICD-10-CM

## 2021-10-08 DIAGNOSIS — Z98.890 OTHER SPECIFIED POSTPROCEDURAL STATES: Chronic | ICD-10-CM

## 2021-10-08 DIAGNOSIS — D64.9 ANEMIA, UNSPECIFIED: ICD-10-CM

## 2021-10-10 PROBLEM — Z87.19 HISTORY OF SMALL BOWEL OBSTRUCTION: Status: RESOLVED | Noted: 2021-10-10 | Resolved: 2021-10-10

## 2021-10-11 ENCOUNTER — APPOINTMENT (OUTPATIENT)
Dept: HEMATOLOGY ONCOLOGY | Facility: CLINIC | Age: 26
End: 2021-10-11
Payer: COMMERCIAL

## 2021-10-11 VITALS
HEART RATE: 111 BPM | BODY MASS INDEX: 26.31 KG/M2 | SYSTOLIC BLOOD PRESSURE: 118 MMHG | TEMPERATURE: 98.6 F | WEIGHT: 168 LBS | DIASTOLIC BLOOD PRESSURE: 82 MMHG

## 2021-10-11 DIAGNOSIS — Z87.19 PERSONAL HISTORY OF OTHER DISEASES OF THE DIGESTIVE SYSTEM: ICD-10-CM

## 2021-10-11 PROCEDURE — 99213 OFFICE O/P EST LOW 20 MIN: CPT

## 2021-10-11 NOTE — PHYSICAL EXAM
[Normal] : full range of motion and no deformities appreciated [de-identified] : anicteric [de-identified] : breathing appeared unlabored [de-identified] : no rashes

## 2021-10-11 NOTE — HISTORY OF PRESENT ILLNESS
[de-identified] : ARSLAN NATARAJAN is a 26 y.o.with a PMH significant for gastric sleeve 2/2011, then guerrero -en-Y bypass 1/2020 due to severe GERD, and then ELAP, RAIZA 4/2020 for SBO, who we are following for an iron deficient anemia. \par \par 5/2012 - S/P sleeve gastrectomy, after which patient lost 100 pounds. \par 1/2020 - Patient underwent revision of her prior bariatric surgery- RNY 2nd to severe GERD. \par 4/2020 - Developed SBO - s/p ELAP, RAIZA.  \par Patient has a history of heavy menstrual periods with iron deficiency anemia. \par She is no longer able to tolerate p.o. iron with recent bowel issues.\par Has been getting IV iron PRN, last 6/2020 (Dr. Cheryl Norris). \par \par Now transferring her care to our office as we are closer to her house.  [de-identified] : Patient reports energy has been down, also has mental fogginess.  Denies any pica but has noticed an increase in headaches recently.  \par Patient with low normal B12 - reports she is taking B12 SL already.\par Denies any changes in her family, medical, or social history since her last visit of 6/29/20.

## 2021-10-11 NOTE — RESULTS/DATA
[FreeTextEntry1] : 9/23/21:\par WBC: 7.89  Hgb: 11.1  Hct: 37.0  MCV: 88.1  Plts: 280\par Ferritin: 9  TSAT: 6%\par \par 3/22/21:  B12: 328

## 2021-10-11 NOTE — REVIEW OF SYSTEMS
[Negative] : Allergic/Immunologic [Patient Intake Form Reviewed] : Patient intake form was reviewed [Fatigue] : fatigue [Constipation] : constipation [Anxiety] : anxiety [de-identified] : Headaches; more mental fogginess when iron is low.  [de-identified] : sleep problems (works night shift)

## 2021-10-11 NOTE — ASSESSMENT
[FreeTextEntry1] : Patient is a 26 y.o. with Iron deficiency anemia 2nd to heavy periods and likely exacerbated by decreased absorption from her bariatric surgeries.\par For IV iron PRN.  Iron stores now low, recommend Venofer x 4. \par \par For completeness sake would recommend monitoring B12 as well since given gastric surgeries she may have an issue with B12 absorption. Last level 3/22/21 borderline low at 328.\par She she reports she is already taking B12 SL will give B12 injections with each infusion. \par Recommend she double the SL B12 dose. \par Check labs 1 month post. \par

## 2021-10-13 DIAGNOSIS — K95.89 OTHER COMPLICATIONS OF OTHER BARIATRIC PROCEDURE: ICD-10-CM

## 2021-10-13 DIAGNOSIS — N92.0 EXCESSIVE AND FREQUENT MENSTRUATION WITH REGULAR CYCLE: ICD-10-CM

## 2021-10-13 DIAGNOSIS — Z98.84 BARIATRIC SURGERY STATUS: ICD-10-CM

## 2021-10-19 ENCOUNTER — RESULT REVIEW (OUTPATIENT)
Age: 26
End: 2021-10-19

## 2021-10-19 ENCOUNTER — APPOINTMENT (OUTPATIENT)
Dept: INFUSION THERAPY | Facility: CLINIC | Age: 26
End: 2021-10-19

## 2021-10-19 VITALS
DIASTOLIC BLOOD PRESSURE: 83 MMHG | WEIGHT: 166 LBS | SYSTOLIC BLOOD PRESSURE: 131 MMHG | BODY MASS INDEX: 26 KG/M2 | HEART RATE: 112 BPM | TEMPERATURE: 98.1 F

## 2021-10-19 LAB
BASOPHILS # BLD AUTO: 0.07 K/UL — SIGNIFICANT CHANGE UP (ref 0–0.2)
BASOPHILS NFR BLD AUTO: 1 % — SIGNIFICANT CHANGE UP (ref 0–2)
EOSINOPHIL # BLD AUTO: 0.15 K/UL — SIGNIFICANT CHANGE UP (ref 0–0.5)
EOSINOPHIL NFR BLD AUTO: 2.2 % — SIGNIFICANT CHANGE UP (ref 0–6)
FERRITIN SERPL-MCNC: 10 NG/ML — LOW (ref 15–150)
HCT VFR BLD CALC: 37.2 % — SIGNIFICANT CHANGE UP (ref 34.5–45)
HGB BLD-MCNC: 12.1 G/DL — SIGNIFICANT CHANGE UP (ref 11.5–15.5)
IMM GRANULOCYTES NFR BLD AUTO: 0.3 % — SIGNIFICANT CHANGE UP (ref 0–1.5)
IRON SATN MFR SERPL: 29 UG/DL — LOW (ref 30–160)
IRON SATN MFR SERPL: 8 % — LOW (ref 14–50)
LYMPHOCYTES # BLD AUTO: 1.92 K/UL — SIGNIFICANT CHANGE UP (ref 1–3.3)
LYMPHOCYTES # BLD AUTO: 28.3 % — SIGNIFICANT CHANGE UP (ref 13–44)
MCHC RBC-ENTMCNC: 27.8 PG — SIGNIFICANT CHANGE UP (ref 27–34)
MCHC RBC-ENTMCNC: 32.5 GM/DL — SIGNIFICANT CHANGE UP (ref 32–36)
MCV RBC AUTO: 85.3 FL — SIGNIFICANT CHANGE UP (ref 80–100)
MONOCYTES # BLD AUTO: 0.4 K/UL — SIGNIFICANT CHANGE UP (ref 0–0.9)
MONOCYTES NFR BLD AUTO: 5.9 % — SIGNIFICANT CHANGE UP (ref 2–14)
NEUTROPHILS # BLD AUTO: 4.22 K/UL — SIGNIFICANT CHANGE UP (ref 1.8–7.4)
NEUTROPHILS NFR BLD AUTO: 62.3 % — SIGNIFICANT CHANGE UP (ref 43–77)
NRBC # BLD: 0 /100 WBCS — SIGNIFICANT CHANGE UP (ref 0–0)
PLATELET # BLD AUTO: 295 K/UL — SIGNIFICANT CHANGE UP (ref 150–400)
RBC # BLD: 4.36 M/UL — SIGNIFICANT CHANGE UP (ref 3.8–5.2)
RBC # FLD: 13.8 % — SIGNIFICANT CHANGE UP (ref 10.3–14.5)
TIBC SERPL-MCNC: 375 UG/DL — SIGNIFICANT CHANGE UP (ref 220–430)
UIBC SERPL-MCNC: 345 UG/DL — SIGNIFICANT CHANGE UP (ref 110–370)
VIT B12 SERPL-MCNC: 280 PG/ML — SIGNIFICANT CHANGE UP (ref 232–1245)
WBC # BLD: 6.78 K/UL — SIGNIFICANT CHANGE UP (ref 3.8–10.5)
WBC # FLD AUTO: 6.78 K/UL — SIGNIFICANT CHANGE UP (ref 3.8–10.5)

## 2021-10-20 DIAGNOSIS — E53.8 DEFICIENCY OF OTHER SPECIFIED B GROUP VITAMINS: ICD-10-CM

## 2021-10-20 DIAGNOSIS — D50.9 IRON DEFICIENCY ANEMIA, UNSPECIFIED: ICD-10-CM

## 2021-10-20 LAB
ERYTHROCYTE [SEDIMENTATION RATE] IN BLOOD: 12 MM/HR — SIGNIFICANT CHANGE UP (ref 0–15)
PCA AB SER-ACNC: SIGNIFICANT CHANGE UP

## 2021-10-21 ENCOUNTER — APPOINTMENT (OUTPATIENT)
Dept: INFUSION THERAPY | Facility: CLINIC | Age: 26
End: 2021-10-21

## 2021-10-21 VITALS
TEMPERATURE: 97.8 F | DIASTOLIC BLOOD PRESSURE: 81 MMHG | WEIGHT: 168 LBS | BODY MASS INDEX: 26.37 KG/M2 | HEIGHT: 67 IN | SYSTOLIC BLOOD PRESSURE: 119 MMHG | HEART RATE: 79 BPM

## 2021-10-21 LAB — IF BLOCK AB SER-ACNC: 1 AU/ML — SIGNIFICANT CHANGE UP (ref 0–1.1)

## 2021-10-26 ENCOUNTER — APPOINTMENT (OUTPATIENT)
Dept: INFUSION THERAPY | Facility: CLINIC | Age: 26
End: 2021-10-26

## 2021-10-26 VITALS
SYSTOLIC BLOOD PRESSURE: 135 MMHG | DIASTOLIC BLOOD PRESSURE: 85 MMHG | BODY MASS INDEX: 26 KG/M2 | HEART RATE: 84 BPM | WEIGHT: 166 LBS

## 2021-10-27 ENCOUNTER — APPOINTMENT (OUTPATIENT)
Dept: HEMATOLOGY ONCOLOGY | Facility: CLINIC | Age: 26
End: 2021-10-27

## 2021-10-28 ENCOUNTER — NON-APPOINTMENT (OUTPATIENT)
Age: 26
End: 2021-10-28

## 2021-10-28 ENCOUNTER — APPOINTMENT (OUTPATIENT)
Dept: INFUSION THERAPY | Facility: CLINIC | Age: 26
End: 2021-10-28

## 2021-10-28 VITALS
TEMPERATURE: 97.9 F | WEIGHT: 170 LBS | DIASTOLIC BLOOD PRESSURE: 82 MMHG | HEIGHT: 67 IN | HEART RATE: 88 BPM | SYSTOLIC BLOOD PRESSURE: 122 MMHG | BODY MASS INDEX: 26.68 KG/M2

## 2021-11-04 ENCOUNTER — APPOINTMENT (OUTPATIENT)
Dept: OBGYN | Facility: CLINIC | Age: 26
End: 2021-11-04
Payer: COMMERCIAL

## 2021-11-04 ENCOUNTER — TRANSCRIPTION ENCOUNTER (OUTPATIENT)
Age: 26
End: 2021-11-04

## 2021-11-04 ENCOUNTER — RESULT CHARGE (OUTPATIENT)
Age: 26
End: 2021-11-04

## 2021-11-04 VITALS
BODY MASS INDEX: 26.68 KG/M2 | HEIGHT: 67 IN | WEIGHT: 170 LBS | SYSTOLIC BLOOD PRESSURE: 116 MMHG | DIASTOLIC BLOOD PRESSURE: 78 MMHG

## 2021-11-04 LAB
BILIRUB UR QL STRIP: NORMAL
GLUCOSE UR-MCNC: 250
HCG UR QL: 8 EU/DL
HGB UR QL STRIP.AUTO: NORMAL
KETONES UR-MCNC: 15
LEUKOCYTE ESTERASE UR QL STRIP: NORMAL
NITRITE UR QL STRIP: NORMAL
PH UR STRIP: 5
PROT UR STRIP-MCNC: 100
SP GR UR STRIP: 1.02

## 2021-11-04 PROCEDURE — 99214 OFFICE O/P EST MOD 30 MIN: CPT

## 2021-11-04 NOTE — HISTORY OF PRESENT ILLNESS
[FreeTextEntry1] : 27 yo went to urgent care last friday with bactrium , she still has the pressure and burning inside.  [Currently Active] : currently active [Men] : men [Vaginal] : vaginal [No] : No

## 2021-11-04 NOTE — PHYSICAL EXAM
[Appropriately responsive] : appropriately responsive [Alert] : alert [No Acute Distress] : no acute distress [Soft] : soft [Non-tender] : non-tender [Non-distended] : non-distended [No HSM] : No HSM [No Mass] : no mass [Oriented x3] : oriented x3 [No Lesions] : no lesions  [Labia Majora] : normal [Labia Minora] : normal [Normal] : normal [Uterine Adnexae] : normal

## 2021-11-04 NOTE — DISCUSSION/SUMMARY
[FreeTextEntry1] : rx for urine, cultured because partner cheated\par urine sent out.\par bld work rx for std

## 2021-11-05 LAB
C TRACH RRNA SPEC QL NAA+PROBE: NOT DETECTED
N GONORRHOEA RRNA SPEC QL NAA+PROBE: NOT DETECTED
SOURCE AMPLIFICATION: NORMAL

## 2021-11-08 ENCOUNTER — APPOINTMENT (OUTPATIENT)
Dept: OBGYN | Facility: CLINIC | Age: 26
End: 2021-11-08
Payer: COMMERCIAL

## 2021-11-08 VITALS
BODY MASS INDEX: 26.68 KG/M2 | WEIGHT: 170 LBS | DIASTOLIC BLOOD PRESSURE: 70 MMHG | TEMPERATURE: 97.6 F | HEIGHT: 67 IN | SYSTOLIC BLOOD PRESSURE: 110 MMHG

## 2021-11-08 DIAGNOSIS — B00.9 HERPESVIRAL INFECTION, UNSPECIFIED: ICD-10-CM

## 2021-11-08 LAB
BACTERIA UR CULT: NORMAL
HCG UR QL: NEGATIVE
QUALITY CONTROL: YES

## 2021-11-08 PROCEDURE — 99214 OFFICE O/P EST MOD 30 MIN: CPT

## 2021-11-08 PROCEDURE — 81025 URINE PREGNANCY TEST: CPT

## 2021-11-08 NOTE — PHYSICAL EXAM
[Normal] : uterus [No Bleeding] : there was no active vaginal bleeding [Uterine Adnexae] : were not tender and not enlarged [de-identified] : = 2 small blisters to the left lower labia.

## 2021-11-08 NOTE — CHIEF COMPLAINT
[Urgent Visit] : Urgent Visit [FreeTextEntry1] :  pt has beenbeing treated for a uti, was here 4 days ago  no visable lesions at the time, she did notice  a lesion the 2 days after her visit  she came in today to have it cultured.

## 2021-11-11 LAB
HSV+VZV DNA SPEC QL NAA+PROBE: ABNORMAL
SPECIMEN SOURCE: NORMAL

## 2021-11-26 ENCOUNTER — OUTPATIENT (OUTPATIENT)
Dept: OUTPATIENT SERVICES | Facility: HOSPITAL | Age: 26
LOS: 1 days | Discharge: HOME | End: 2021-11-26

## 2021-11-26 DIAGNOSIS — Z98.890 OTHER SPECIFIED POSTPROCEDURAL STATES: Chronic | ICD-10-CM

## 2021-11-26 DIAGNOSIS — E53.8 DEFICIENCY OF OTHER SPECIFIED B GROUP VITAMINS: ICD-10-CM

## 2021-11-26 DIAGNOSIS — N92.0 EXCESSIVE AND FREQUENT MENSTRUATION WITH REGULAR CYCLE: ICD-10-CM

## 2021-11-26 DIAGNOSIS — Z98.84 BARIATRIC SURGERY STATUS: Chronic | ICD-10-CM

## 2021-11-26 DIAGNOSIS — K95.89 OTHER COMPLICATIONS OF OTHER BARIATRIC PROCEDURE: ICD-10-CM

## 2021-11-26 DIAGNOSIS — Z90.3 ACQUIRED ABSENCE OF STOMACH [PART OF]: Chronic | ICD-10-CM

## 2021-11-26 DIAGNOSIS — Z98.84 BARIATRIC SURGERY STATUS: ICD-10-CM

## 2021-11-26 DIAGNOSIS — D50.9 IRON DEFICIENCY ANEMIA, UNSPECIFIED: ICD-10-CM

## 2021-11-29 ENCOUNTER — APPOINTMENT (OUTPATIENT)
Dept: HEMATOLOGY ONCOLOGY | Facility: CLINIC | Age: 26
End: 2021-11-29

## 2021-12-22 LAB
BASOPHILS # BLD AUTO: 0.06 K/UL
BASOPHILS NFR BLD AUTO: 1.2 %
EOSINOPHIL # BLD AUTO: 0.23 K/UL
EOSINOPHIL NFR BLD AUTO: 4.4 %
FERRITIN SERPL-MCNC: 126 NG/ML
HCT VFR BLD CALC: 39.9 %
HGB BLD-MCNC: 12.8 G/DL
IMM GRANULOCYTES NFR BLD AUTO: 0.2 %
IRON SATN MFR SERPL: 29 %
IRON SERPL-MCNC: 90 UG/DL
LYMPHOCYTES # BLD AUTO: 1.8 K/UL
LYMPHOCYTES NFR BLD AUTO: 34.6 %
MAN DIFF?: NORMAL
MCHC RBC-ENTMCNC: 28.8 PG
MCHC RBC-ENTMCNC: 32.1 GM/DL
MCV RBC AUTO: 89.9 FL
MONOCYTES # BLD AUTO: 0.55 K/UL
MONOCYTES NFR BLD AUTO: 10.6 %
NEUTROPHILS # BLD AUTO: 2.55 K/UL
NEUTROPHILS NFR BLD AUTO: 49 %
PLATELET # BLD AUTO: 236 K/UL
RBC # BLD: 4.44 M/UL
RBC # FLD: 13.5 %
TIBC SERPL-MCNC: 307 UG/DL
UIBC SERPL-MCNC: 217 UG/DL
VIT B12 SERPL-MCNC: 387 PG/ML
WBC # FLD AUTO: 5.2 K/UL

## 2021-12-27 ENCOUNTER — NON-APPOINTMENT (OUTPATIENT)
Age: 26
End: 2021-12-27

## 2022-01-21 ENCOUNTER — APPOINTMENT (OUTPATIENT)
Dept: PSYCHIATRY | Facility: CLINIC | Age: 27
End: 2022-01-21

## 2022-02-27 ENCOUNTER — NON-APPOINTMENT (OUTPATIENT)
Age: 27
End: 2022-02-27

## 2022-03-04 ENCOUNTER — APPOINTMENT (OUTPATIENT)
Dept: OBGYN | Facility: CLINIC | Age: 27
End: 2022-03-04
Payer: COMMERCIAL

## 2022-03-04 VITALS
BODY MASS INDEX: 25.43 KG/M2 | HEIGHT: 67 IN | DIASTOLIC BLOOD PRESSURE: 72 MMHG | WEIGHT: 162 LBS | SYSTOLIC BLOOD PRESSURE: 114 MMHG

## 2022-03-04 PROCEDURE — 99395 PREV VISIT EST AGE 18-39: CPT

## 2022-03-04 NOTE — PHYSICAL EXAM
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

## 2022-03-04 NOTE — HISTORY OF PRESENT ILLNESS
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 take Macrobid prophylactic.  [Currently Active] : currently active [Men] : men [Vaginal] : vaginal [Yes] : Yes

## 2022-03-07 LAB
C TRACH RRNA SPEC QL NAA+PROBE: NOT DETECTED
HPV HIGH+LOW RISK DNA PNL CVX: NOT DETECTED
N GONORRHOEA RRNA SPEC QL NAA+PROBE: NOT DETECTED
SOURCE TP AMPLIFICATION: NORMAL

## 2022-03-09 LAB — CYTOLOGY CVX/VAG DOC THIN PREP: ABNORMAL

## 2022-04-27 ENCOUNTER — APPOINTMENT (OUTPATIENT)
Dept: PSYCHIATRY | Facility: CLINIC | Age: 27
End: 2022-04-27

## 2022-05-05 ENCOUNTER — APPOINTMENT (OUTPATIENT)
Dept: PSYCHIATRY | Facility: CLINIC | Age: 27
End: 2022-05-05
Payer: COMMERCIAL

## 2022-05-05 DIAGNOSIS — F45.22 BODY DYSMORPHIC DISORDER: ICD-10-CM

## 2022-05-05 PROCEDURE — 99205 OFFICE O/P NEW HI 60 MIN: CPT

## 2022-08-04 ENCOUNTER — APPOINTMENT (OUTPATIENT)
Dept: PSYCHIATRY | Facility: CLINIC | Age: 27
End: 2022-08-04

## 2022-08-04 DIAGNOSIS — F90.0 ATTENTION-DEFICIT HYPERACTIVITY DISORDER, PREDOMINANTLY INATTENTIVE TYPE: ICD-10-CM

## 2022-08-04 DIAGNOSIS — F41.9 ANXIETY DISORDER, UNSPECIFIED: ICD-10-CM

## 2022-08-04 DIAGNOSIS — F10.11 ALCOHOL ABUSE, IN REMISSION: ICD-10-CM

## 2022-08-04 DIAGNOSIS — F32.A ANXIETY DISORDER, UNSPECIFIED: ICD-10-CM

## 2022-08-04 PROCEDURE — 99214 OFFICE O/P EST MOD 30 MIN: CPT

## 2022-08-08 ENCOUNTER — APPOINTMENT (OUTPATIENT)
Dept: OBGYN | Facility: CLINIC | Age: 27
End: 2022-08-08

## 2022-08-08 VITALS
SYSTOLIC BLOOD PRESSURE: 100 MMHG | BODY MASS INDEX: 24.64 KG/M2 | DIASTOLIC BLOOD PRESSURE: 60 MMHG | HEIGHT: 67 IN | TEMPERATURE: 97.9 F | WEIGHT: 157 LBS

## 2022-08-08 LAB
BILIRUB UR QL STRIP: NORMAL
CLARITY UR: CLEAR
COLLECTION METHOD: NORMAL
GLUCOSE UR-MCNC: NORMAL
HCG UR QL: 0.2 EU/DL
HCG UR QL: NEGATIVE
HGB UR QL STRIP.AUTO: NORMAL
KETONES UR-MCNC: NORMAL
LEUKOCYTE ESTERASE UR QL STRIP: NORMAL
NITRITE UR QL STRIP: NORMAL
PH UR STRIP: 7
PROT UR STRIP-MCNC: NORMAL
QUALITY CONTROL: YES
SP GR UR STRIP: 1.01

## 2022-08-08 PROCEDURE — 81003 URINALYSIS AUTO W/O SCOPE: CPT | Mod: QW

## 2022-08-08 PROCEDURE — 81025 URINE PREGNANCY TEST: CPT

## 2022-08-08 PROCEDURE — 99213 OFFICE O/P EST LOW 20 MIN: CPT

## 2022-08-08 NOTE — COUNSELING
[Nutrition/ Exercise/ Weight Management] : nutrition, exercise, weight management [Body Image] : body image [Vitamins/Supplements] : vitamins/supplements [STD (testing, results, tx)] : STD (testing, results, tx) [Medication Management] : medication management

## 2022-08-08 NOTE — DISCUSSION/SUMMARY
[FreeTextEntry1] : 28 YO PATIENT PRESENTS IN OFFICE FOR COMPLAINTS OF VAGINAL DISCHARGE X 2 WEEKS\par PATIENT STATES DISCHARGE IS WHITE IN COLOR WITH FOUL ODOR\par \par CULTURES COLLECTED- STD TESTING DESIRED \par PELVIC EXAM WNL\par RX FLAGYL AND DIFLUCAN SENT TO PHARMACY WITH INSTRUCTIONS ON USE, PATIENT STATES UNDERSTANDING \par ALL QUESTIONS ANSWERED TO PATIENT SATISFACTION \par \par RTO IN 3 WEEKS FOR TEST OF CURE\par

## 2022-08-11 LAB
A VAGINAE DNA VAG QL NAA+PROBE: ABNORMAL
BVAB2 DNA VAG QL NAA+PROBE: NORMAL
C KRUSEI DNA VAG QL NAA+PROBE: NEGATIVE
C TRACH RRNA SPEC QL NAA+PROBE: NEGATIVE
MEGA1 DNA VAG QL NAA+PROBE: ABNORMAL
N GONORRHOEA RRNA SPEC QL NAA+PROBE: NEGATIVE
T VAGINALIS RRNA SPEC QL NAA+PROBE: NEGATIVE

## 2022-09-08 ENCOUNTER — TRANSCRIPTION ENCOUNTER (OUTPATIENT)
Age: 27
End: 2022-09-08

## 2022-09-10 ENCOUNTER — APPOINTMENT (OUTPATIENT)
Dept: OBGYN | Facility: CLINIC | Age: 27
End: 2022-09-10

## 2022-09-10 VITALS
TEMPERATURE: 98 F | BODY MASS INDEX: 23.86 KG/M2 | HEIGHT: 67 IN | SYSTOLIC BLOOD PRESSURE: 110 MMHG | DIASTOLIC BLOOD PRESSURE: 60 MMHG | WEIGHT: 152 LBS

## 2022-09-10 PROCEDURE — 99214 OFFICE O/P EST MOD 30 MIN: CPT

## 2022-09-10 NOTE — PHYSICAL EXAM
[Appropriately responsive] : appropriately responsive [Alert] : alert [No Acute Distress] : no acute distress [Soft] : soft [Oriented x3] : oriented x3 [No Lesions] : no lesions  [Labia Majora] : normal [Labia Minora] : normal [Normal] : normal [Uterine Adnexae] : normal

## 2022-09-10 NOTE — HISTORY OF PRESENT ILLNESS
[FreeTextEntry1] : 26 yo here for 6mo check had itch and dc  had changed her soap . \par No outbreaks, doing well on pill. NO covid. Used difucan 3 days ago and sx are imp of itch . \par doing well on her pill minimal period [Currently Active] : currently active [Men] : men

## 2022-09-10 NOTE — DISCUSSION/SUMMARY
[FreeTextEntry1] : synalar for irritation\par oc renewal\par no smoking, RBAD .\par Discussed the risks of DVT and blood clots,strokes\par  good and bad side effects of the pill discussed and instructions on how to take pills and when to use back up. \par Encouraged exercise , \par good diet filled with,plant based foods, calcium and vit.D.rtn 6 months. \par Discussed SBE,\par Discussed the NIH suggests minimum of 2.5 hours of exercise a week\par If contracted covid call office to change to progesterone only pill(Slynd) for 3 months) DIsc. hypercoagulative state/or ASA therapy low dose\par Answered any questions she may have.\par

## 2022-09-13 LAB
CANDIDA VAG CYTO: NOT DETECTED
G VAGINALIS+PREV SP MTYP VAG QL MICRO: DETECTED
T VAGINALIS VAG QL WET PREP: NOT DETECTED

## 2022-09-28 ENCOUNTER — APPOINTMENT (OUTPATIENT)
Dept: GASTROENTEROLOGY | Facility: CLINIC | Age: 27
End: 2022-09-28

## 2022-09-28 VITALS
DIASTOLIC BLOOD PRESSURE: 93 MMHG | BODY MASS INDEX: 23.54 KG/M2 | HEIGHT: 67 IN | HEART RATE: 82 BPM | WEIGHT: 150 LBS | SYSTOLIC BLOOD PRESSURE: 135 MMHG

## 2022-09-28 PROCEDURE — 99214 OFFICE O/P EST MOD 30 MIN: CPT

## 2022-09-28 RX ORDER — PANTOPRAZOLE 40 MG/1
40 TABLET, DELAYED RELEASE ORAL DAILY
Qty: 90 | Refills: 3 | Status: ACTIVE | COMMUNITY
Start: 2022-09-28 | End: 1900-01-01

## 2022-09-28 NOTE — HISTORY OF PRESENT ILLNESS
[FreeTextEntry1] : Ms. ARSLAN NATARAJAN is a 27 year old female with history of Brittany-en-Y gastric bypass. Patient has had significant weight loss as a result. Patient has had long-standing problems with constipation which had responded to the use of Linzess in the past. Patient also has problems with heartburn and takes daily PPIs. There is no dysphagia. Patient has recently noted intermittent episodes of right upper quadrant discomfort occasionally radiating to the back. Some of these episodes are long-lasting and severe. Patient is concerned about the etiology. There is some associated nausea as well.\par

## 2022-09-28 NOTE — ASSESSMENT
[FreeTextEntry1] : 26 yo female with history of RUQ discomfort, GERD, and constipation. Will restart Linzess, add \par pantoprazole. Patient encouraged to take daily vitamin supplements. Will arrange ultrasound to R/O biliary colic.

## 2022-09-28 NOTE — PHYSICAL EXAM

## 2022-09-30 ENCOUNTER — APPOINTMENT (OUTPATIENT)
Dept: ULTRASOUND IMAGING | Facility: CLINIC | Age: 27
End: 2022-09-30

## 2022-09-30 ENCOUNTER — OUTPATIENT (OUTPATIENT)
Dept: OUTPATIENT SERVICES | Facility: HOSPITAL | Age: 27
LOS: 1 days | End: 2022-09-30
Payer: COMMERCIAL

## 2022-09-30 DIAGNOSIS — Z98.890 OTHER SPECIFIED POSTPROCEDURAL STATES: Chronic | ICD-10-CM

## 2022-09-30 DIAGNOSIS — Z98.84 BARIATRIC SURGERY STATUS: Chronic | ICD-10-CM

## 2022-09-30 DIAGNOSIS — R10.11 RIGHT UPPER QUADRANT PAIN: ICD-10-CM

## 2022-09-30 DIAGNOSIS — Z90.3 ACQUIRED ABSENCE OF STOMACH [PART OF]: Chronic | ICD-10-CM

## 2022-09-30 PROCEDURE — 76700 US EXAM ABDOM COMPLETE: CPT

## 2022-09-30 PROCEDURE — 76700 US EXAM ABDOM COMPLETE: CPT | Mod: 26

## 2022-10-04 ENCOUNTER — OUTPATIENT (OUTPATIENT)
Dept: OUTPATIENT SERVICES | Facility: HOSPITAL | Age: 27
LOS: 1 days | Discharge: HOME | End: 2022-10-04

## 2022-10-04 DIAGNOSIS — Z98.890 OTHER SPECIFIED POSTPROCEDURAL STATES: Chronic | ICD-10-CM

## 2022-10-04 DIAGNOSIS — Z98.84 BARIATRIC SURGERY STATUS: Chronic | ICD-10-CM

## 2022-10-04 DIAGNOSIS — D50.9 IRON DEFICIENCY ANEMIA, UNSPECIFIED: ICD-10-CM

## 2022-10-04 DIAGNOSIS — Z90.3 ACQUIRED ABSENCE OF STOMACH [PART OF]: Chronic | ICD-10-CM

## 2022-10-05 ENCOUNTER — APPOINTMENT (OUTPATIENT)
Dept: INFUSION THERAPY | Facility: CLINIC | Age: 27
End: 2022-10-05

## 2022-10-05 ENCOUNTER — APPOINTMENT (OUTPATIENT)
Dept: HEMATOLOGY ONCOLOGY | Facility: CLINIC | Age: 27
End: 2022-10-05
Payer: COMMERCIAL

## 2022-10-05 VITALS
DIASTOLIC BLOOD PRESSURE: 96 MMHG | OXYGEN SATURATION: 100 % | RESPIRATION RATE: 18 BRPM | TEMPERATURE: 98.3 F | HEART RATE: 91 BPM | SYSTOLIC BLOOD PRESSURE: 115 MMHG

## 2022-10-05 DIAGNOSIS — Z87.42 PERSONAL HISTORY OF OTHER DISEASES OF THE FEMALE GENITAL TRACT: ICD-10-CM

## 2022-10-05 PROCEDURE — 99214 OFFICE O/P EST MOD 30 MIN: CPT

## 2022-10-05 RX ORDER — FLUVOXAMINE MALEATE 50 MG/1
50 TABLET ORAL
Qty: 135 | Refills: 0 | Status: DISCONTINUED | COMMUNITY
Start: 2019-08-01 | End: 2022-10-05

## 2022-10-05 RX ORDER — CLONAZEPAM 0.5 MG/1
0.5 TABLET ORAL
Qty: 90 | Refills: 0 | Status: DISCONTINUED | COMMUNITY
Start: 2018-10-29 | End: 2022-10-05

## 2022-10-05 RX ORDER — CIPROFLOXACIN HYDROCHLORIDE 500 MG/1
500 TABLET, FILM COATED ORAL
Qty: 14 | Refills: 0 | Status: DISCONTINUED | COMMUNITY
Start: 2021-11-04 | End: 2022-10-05

## 2022-10-05 RX ORDER — FLUOCINOLONE ACETONIDE 0.25 MG/G
0.03 OINTMENT TOPICAL TWICE DAILY
Qty: 15 | Refills: 0 | Status: DISCONTINUED | COMMUNITY
Start: 2022-09-10 | End: 2022-10-05

## 2022-10-05 RX ORDER — FLUCONAZOLE 150 MG/1
150 TABLET ORAL
Qty: 2 | Refills: 0 | Status: DISCONTINUED | COMMUNITY
Start: 2022-08-08 | End: 2022-10-05

## 2022-10-05 RX ORDER — NITROFURANTOIN MACROCRYSTALS 50 MG/1
50 CAPSULE ORAL
Qty: 30 | Refills: 1 | Status: DISCONTINUED | COMMUNITY
Start: 2021-09-27 | End: 2022-10-05

## 2022-10-05 RX ORDER — METRONIDAZOLE 500 MG/1
500 TABLET ORAL TWICE DAILY
Qty: 10 | Refills: 0 | Status: DISCONTINUED | COMMUNITY
Start: 2022-08-08 | End: 2022-10-05

## 2022-10-05 RX ORDER — BUPROPION HYDROCHLORIDE 150 MG/1
150 TABLET, EXTENDED RELEASE ORAL
Qty: 30 | Refills: 0 | Status: DISCONTINUED | COMMUNITY
Start: 2018-08-03 | End: 2022-10-05

## 2022-10-05 RX ORDER — VALACYCLOVIR 1 G/1
1 TABLET, FILM COATED ORAL
Qty: 20 | Refills: 0 | Status: DISCONTINUED | COMMUNITY
Start: 2021-11-08 | End: 2022-10-05

## 2022-10-05 RX ORDER — PANTOPRAZOLE 40 MG/1
40 TABLET, DELAYED RELEASE ORAL
Qty: 30 | Refills: 0 | Status: DISCONTINUED | COMMUNITY
Start: 2020-01-08 | End: 2022-10-05

## 2022-10-05 RX ORDER — METRONIDAZOLE 7.5 MG/G
0.75 GEL VAGINAL
Qty: 1 | Refills: 0 | Status: DISCONTINUED | COMMUNITY
Start: 2022-09-13 | End: 2022-10-05

## 2022-10-05 NOTE — REVIEW OF SYSTEMS
[FreeTextEntry7] : RUQ abdominal pains [de-identified] : Mental fogginess when iron is low.  [de-identified] : sleep problems (works night shift)

## 2022-10-05 NOTE — ASSESSMENT
[FreeTextEntry1] : Patient is a 27 y.o. with recurrent multifactorial iron deficiency anemia. \par Felt to be from  decreased absorption from her bariatric surgeries, also from hx of heavy periods.\par Has been ~ 1 year since last IV iron infusion.  \par  \par Generally if malabsorption only issue then patients usually needs IV iron ~ Q18 - 24 months.  Periods lighter since on OCP's.  \par Patient now with RUQ pains. Would recommend she d/w GI - may need EGD for completeness sake.  \par \par For now recommend Feraheme x 2 with B12.   \par Again reminded of importance of taking SL B12 daily. \par Check labs 1 month post. \par \par PCP [facility], [facility] \par Dr. Wright

## 2022-10-05 NOTE — HISTORY OF PRESENT ILLNESS
[de-identified] : ARSLAN NATARAJAN is a 27 y.o.with a PMH significant for gastric sleeve 2/2011, then guerrero -en-Y bypass 1/2020 due to severe GERD, and then ELAP, RAIZA 4/2020 for SBO, who we are following for an iron deficient anemia. \par \par 5/2012 - S/P sleeve gastrectomy, after which patient lost 100 pounds. \par 1/2020 - Patient underwent revision of her prior bariatric surgery- RnY 2nd to severe GERD. \par 4/2020 - Developed SBO - s/p ELAP, RAIZA.  \par Patient has a history of heavy menstrual periods with iron deficiency anemia. \par She is no longer able to tolerate p.o. iron with recent bowel issues.\par Had been getting IV iron PRN, last 6/2020 (Dr. Cheryl Norris). \par \par 10/11/21 - Initially seen in our office - (Transferred her care to our office as we are closer to her house). \par 9/23/21 -  WBC: 7.89  Hgb: 11.1  Hct: 37.0  MCV: 88.1  Plts: 280, Ferritin: 9  TSAT: 6%\par 3/22/21 -  B12: 328\par Venofer and B12 injections x 4 completed 10/28/21 - labs ~ 2 months post with good response -  12/21/21 -  Ferritin 126, TSAT 29%,  Hgb: 39.9, MCV: 90 [de-identified] : Patient comes in today with labs from her PCP\par 10/3/22 - WBC: 8.98,  Hgb: 10.8,  Hct: 35.3,  MCV: 88.3,  Plts: 344,  Ferritin: 8,  TSAT: 7%,  B12: 214.\par \par Patient states she has been feeling extremely tired - her routine PE was due in November and she asked to be seen sooner due to the fatigue and feeling poorly. \par Also has mental fogginess.  Again denies any pica. \par She does work out and has been having increased lightheadedness and tachycardia.  \par States has not been taking B12 tabs but recently ordered. \par \par Patient has been on birth control pills and her periods have been much lighter.  \par \par Has seen Dr. Wright recently for RUQ pains.  Had sono that was OK.  She states they really thought it would show a gallbladder issue.  Was re-started on "Linsest."  \par She is fearful it is something more as she has hx of SBO.  \par \par Denies any changes in her family, medical, or social history since her last visit of 10/11/21.

## 2022-10-05 NOTE — PHYSICAL EXAM
[de-identified] : anicteric [de-identified] : breathing appeared unlabored [de-identified] : no rashes

## 2022-10-06 ENCOUNTER — RESULT CHARGE (OUTPATIENT)
Age: 27
End: 2022-10-06

## 2022-10-06 DIAGNOSIS — K95.89 OTHER COMPLICATIONS OF OTHER BARIATRIC PROCEDURE: ICD-10-CM

## 2022-10-06 DIAGNOSIS — Z98.84 BARIATRIC SURGERY STATUS: ICD-10-CM

## 2022-10-06 DIAGNOSIS — R10.11 RIGHT UPPER QUADRANT PAIN: ICD-10-CM

## 2022-10-06 DIAGNOSIS — E53.8 DEFICIENCY OF OTHER SPECIFIED B GROUP VITAMINS: ICD-10-CM

## 2022-10-10 ENCOUNTER — OUTPATIENT (OUTPATIENT)
Dept: OUTPATIENT SERVICES | Facility: HOSPITAL | Age: 27
LOS: 1 days | End: 2022-10-10
Payer: COMMERCIAL

## 2022-10-10 ENCOUNTER — APPOINTMENT (OUTPATIENT)
Dept: BARIATRICS | Facility: CLINIC | Age: 27
End: 2022-10-10

## 2022-10-10 ENCOUNTER — RESULT REVIEW (OUTPATIENT)
Age: 27
End: 2022-10-10

## 2022-10-10 VITALS
HEART RATE: 83 BPM | TEMPERATURE: 96.7 F | HEIGHT: 67 IN | SYSTOLIC BLOOD PRESSURE: 100 MMHG | DIASTOLIC BLOOD PRESSURE: 74 MMHG | OXYGEN SATURATION: 100 % | WEIGHT: 159.39 LBS | BODY MASS INDEX: 25.02 KG/M2

## 2022-10-10 DIAGNOSIS — Z98.890 OTHER SPECIFIED POSTPROCEDURAL STATES: Chronic | ICD-10-CM

## 2022-10-10 DIAGNOSIS — Z98.84 BARIATRIC SURGERY STATUS: Chronic | ICD-10-CM

## 2022-10-10 DIAGNOSIS — K21.9 GASTRO-ESOPHAGEAL REFLUX DISEASE W/OUT ESOPHAGITIS: ICD-10-CM

## 2022-10-10 DIAGNOSIS — Z98.84 BARIATRIC SURGERY STATUS: ICD-10-CM

## 2022-10-10 DIAGNOSIS — Z90.3 ACQUIRED ABSENCE OF STOMACH [PART OF]: Chronic | ICD-10-CM

## 2022-10-10 LAB — HCG UR QL: NEGATIVE — SIGNIFICANT CHANGE UP

## 2022-10-10 PROCEDURE — 74177 CT ABD & PELVIS W/CONTRAST: CPT | Mod: 26

## 2022-10-10 PROCEDURE — 81025 URINE PREGNANCY TEST: CPT

## 2022-10-10 PROCEDURE — 99204 OFFICE O/P NEW MOD 45 MIN: CPT

## 2022-10-10 PROCEDURE — 74177 CT ABD & PELVIS W/CONTRAST: CPT

## 2022-10-10 RX ORDER — LIDOCAINE 5 G/100G
5 OINTMENT TOPICAL
Qty: 1 | Refills: 0 | Status: DISCONTINUED | COMMUNITY
Start: 2021-11-08 | End: 2022-10-10

## 2022-10-10 RX ORDER — PSYLLIUM HUSK 0.4 G
CAPSULE ORAL
Refills: 0 | Status: DISCONTINUED | COMMUNITY
End: 2022-10-10

## 2022-10-10 NOTE — DATA REVIEWED
[FreeTextEntry1] : CT abdomen images reviewed.  Final read pending.  Dilated loops of bowel noted, large stool burden.

## 2022-10-10 NOTE — REVIEW OF SYSTEMS
[Negative] : Allergic/Immunologic [Abdominal Pain] : abdominal pain [Constipation] : constipation [Reflux/Heartburn] : reflux/heartburn [Hernia] : hernia

## 2022-10-10 NOTE — HISTORY OF PRESENT ILLNESS
[de-identified] : 27-year-old female with a history of morbid obesity status post bariatric surgery with Dr. Edwards at [place]: \par 2012 Laparoscopic sleeve gastrectomy, \par 2019 conversion to Brittany-en-Y gastric bypass for severe GERD,\par 4/2020 laparoscopy and lysis of adhesions for SBO/ internal hernia - no bowel resection\par \par Patient now presents with abdominal bloating and discomfort/pain. Notes mid abdominal pain that radiates to RUQ and back. Usually postprandial but not always. Sometimes skips meals due to anticipation of pain. Also notes some burning/reflux occasionally minimally improved with PPI. Patient has tried gas-x and other medications to try to help with bloating and pain without much relief. Notes these symptoms have been going on for the past few months. \par \par Patient seen by GI and restarted on Linzess. RUQ sonogram done to r/o cholelithiasis and GB was within normal limits. Denies vomiting but does note post-prandial nausea. Also notes constipation which she has had for years.

## 2022-10-10 NOTE — ASSESSMENT
[FreeTextEntry1] : 27-year-old female status post laparoscopic sleeve gastrectomy converted to Brittany-en-Y gastric bypass for severe GERD status post diagnostic laparoscopy and lysis of adhesions for SBO/internal hernia now presenting with abdominal pain, bloating and nausea\par \par CT read pending\par \par Patient's abdominal pain may be related to biliary colic, partial small bowel obstruction due to adhesions or internal hernia.  I discussed options with patient including further work-up including pH with impedance testing.  Patient states she is very uncomfortable with worsening symptoms and wants an answer/solution as soon as possible.  I discussed doing a laparoscopic cholecystectomy and a diagnostic laparoscopy to evaluate her bypass anatomy and rule out an internal hernia or other possible reasons for her symptoms.  Patient was agreeable.  If nothing is found Intra-Op that can explain her symptoms, will do further testing and discussed with patient she may be having gas bloat - FODMAP diet\par -OR planning for diagnostic laparoscopy, cholecystectomy, upper endoscopy\par -Medical clearance\par -Hematology clearance

## 2022-10-10 NOTE — PHYSICAL EXAM
[Normal] : affect appropriate [de-identified] : Anicteric no conjunctival injection [de-identified] : Supple, no obvious masses [de-identified] : Equal chest rise, nonlabored respirations. No audible wheezing. [de-identified] : Regular rate and rhythm. [de-identified] : Soft, nondistended, nontender [de-identified] : No obvious deformity

## 2022-10-13 ENCOUNTER — APPOINTMENT (OUTPATIENT)
Dept: INFUSION THERAPY | Facility: CLINIC | Age: 27
End: 2022-10-13

## 2022-10-13 VITALS
RESPIRATION RATE: 17 BRPM | OXYGEN SATURATION: 100 % | TEMPERATURE: 98.2 F | HEART RATE: 89 BPM | DIASTOLIC BLOOD PRESSURE: 80 MMHG | SYSTOLIC BLOOD PRESSURE: 126 MMHG

## 2022-10-26 ENCOUNTER — APPOINTMENT (OUTPATIENT)
Dept: GASTROENTEROLOGY | Facility: CLINIC | Age: 27
End: 2022-10-26

## 2022-10-26 VITALS
HEART RATE: 94 BPM | WEIGHT: 159 LBS | SYSTOLIC BLOOD PRESSURE: 119 MMHG | DIASTOLIC BLOOD PRESSURE: 84 MMHG | HEIGHT: 67 IN | BODY MASS INDEX: 24.96 KG/M2

## 2022-10-26 DIAGNOSIS — D64.9 ANEMIA, UNSPECIFIED: ICD-10-CM

## 2022-10-26 PROCEDURE — 99213 OFFICE O/P EST LOW 20 MIN: CPT

## 2022-10-30 NOTE — REVIEW OF SYSTEMS
[Abdominal Pain] : abdominal pain [Constipation] : constipation [Heartburn] : heartburn [Bloating (gassiness)] : bloating [Negative] : Heme/Lymph [Vomiting] : no vomiting [Diarrhea] : no diarrhea [Melena (black stool)] : no melena [Bleeding] : no bleeding [Fecal Incontinence (soiling)] : no fecal incontinence

## 2022-10-30 NOTE — ASSESSMENT
[FreeTextEntry1] : Plan:\par Given pt's worsening anemia and epigastric/RUQ pain, will perform EGD. Pt reports slight improvement on protonix previously prescribed. Risks versus benefits as well as instructions reviewed, pt agrees to plan procedure. For constipation, pt feels linzess worked the first few days but now is constipated again. Recommend pt do colonoscopy prep today to empty stool burden, then starting tomorrow we can increase linzess dosing to 290MCG, provided samples. Pt expressed understanding of plan, all questions answered. Discussed with Dr. Cunha. I have spent 25 minutes on this encounter.

## 2022-10-30 NOTE — HISTORY OF PRESENT ILLNESS
[FreeTextEntry1] : Balwinder Jordan is a 27 year year old female presenting today for follow up visit for abdominal pain, GERD, and constipation. Was sent for US for RUQ/epigastric pain that was normal. Since her last visit, has been taking protonix as recommended with some mild relief. Has also seen a bariatric surgeon as well as a hematologist. Was found to have acute anemia with low ferritin, pt has been anemic and received iron infusions in the past. Denies overt signs of bleeding such as melena or hematochezia. Also notes that the linzess she was given for her chronic constipation was improved initially by linzess, but after a few days symptoms returned.

## 2022-11-09 ENCOUNTER — TRANSCRIPTION ENCOUNTER (OUTPATIENT)
Age: 27
End: 2022-11-09

## 2022-11-09 ENCOUNTER — APPOINTMENT (OUTPATIENT)
Dept: HEMATOLOGY ONCOLOGY | Facility: CLINIC | Age: 27
End: 2022-11-09

## 2022-11-10 ENCOUNTER — TRANSCRIPTION ENCOUNTER (OUTPATIENT)
Age: 27
End: 2022-11-10

## 2022-11-10 ENCOUNTER — APPOINTMENT (OUTPATIENT)
Dept: PSYCHIATRY | Facility: CLINIC | Age: 27
End: 2022-11-10

## 2022-11-10 ENCOUNTER — APPOINTMENT (OUTPATIENT)
Dept: HEMATOLOGY ONCOLOGY | Facility: CLINIC | Age: 27
End: 2022-11-10

## 2022-11-10 ENCOUNTER — INPATIENT (INPATIENT)
Facility: HOSPITAL | Age: 27
LOS: 0 days | Discharge: HOME | DRG: 337 | End: 2022-11-11
Attending: STUDENT IN AN ORGANIZED HEALTH CARE EDUCATION/TRAINING PROGRAM | Admitting: STUDENT IN AN ORGANIZED HEALTH CARE EDUCATION/TRAINING PROGRAM
Payer: COMMERCIAL

## 2022-11-10 VITALS
HEIGHT: 66 IN | HEART RATE: 72 BPM | TEMPERATURE: 98 F | WEIGHT: 154.98 LBS | OXYGEN SATURATION: 99 % | SYSTOLIC BLOOD PRESSURE: 107 MMHG | DIASTOLIC BLOOD PRESSURE: 73 MMHG | RESPIRATION RATE: 16 BRPM

## 2022-11-10 DIAGNOSIS — Z98.890 OTHER SPECIFIED POSTPROCEDURAL STATES: Chronic | ICD-10-CM

## 2022-11-10 DIAGNOSIS — Z98.84 BARIATRIC SURGERY STATUS: Chronic | ICD-10-CM

## 2022-11-10 DIAGNOSIS — R10.9 UNSPECIFIED ABDOMINAL PAIN: ICD-10-CM

## 2022-11-10 DIAGNOSIS — Z90.3 ACQUIRED ABSENCE OF STOMACH [PART OF]: Chronic | ICD-10-CM

## 2022-11-10 LAB
ALBUMIN SERPL ELPH-MCNC: 3.6 G/DL — SIGNIFICANT CHANGE UP (ref 3.3–5)
ALP SERPL-CCNC: 67 U/L — SIGNIFICANT CHANGE UP (ref 30–120)
ALT FLD-CCNC: 25 U/L DA — SIGNIFICANT CHANGE UP (ref 10–60)
ANION GAP SERPL CALC-SCNC: 6 MMOL/L — SIGNIFICANT CHANGE UP (ref 5–17)
APPEARANCE UR: CLEAR — SIGNIFICANT CHANGE UP
APTT BLD: 29.6 SEC — SIGNIFICANT CHANGE UP (ref 27.5–35.5)
AST SERPL-CCNC: 20 U/L — SIGNIFICANT CHANGE UP (ref 10–40)
BASOPHILS # BLD AUTO: 0.05 K/UL — SIGNIFICANT CHANGE UP (ref 0–0.2)
BASOPHILS NFR BLD AUTO: 0.5 % — SIGNIFICANT CHANGE UP (ref 0–2)
BILIRUB SERPL-MCNC: 0.5 MG/DL — SIGNIFICANT CHANGE UP (ref 0.2–1.2)
BILIRUB UR-MCNC: NEGATIVE — SIGNIFICANT CHANGE UP
BLD GP AB SCN SERPL QL: SIGNIFICANT CHANGE UP
BUN SERPL-MCNC: 9 MG/DL — SIGNIFICANT CHANGE UP (ref 7–23)
CALCIUM SERPL-MCNC: 8.6 MG/DL — SIGNIFICANT CHANGE UP (ref 8.4–10.5)
CHLORIDE SERPL-SCNC: 103 MMOL/L — SIGNIFICANT CHANGE UP (ref 96–108)
CO2 SERPL-SCNC: 28 MMOL/L — SIGNIFICANT CHANGE UP (ref 22–31)
COLOR SPEC: YELLOW — SIGNIFICANT CHANGE UP
CREAT SERPL-MCNC: 0.82 MG/DL — SIGNIFICANT CHANGE UP (ref 0.5–1.3)
DIFF PNL FLD: NEGATIVE — SIGNIFICANT CHANGE UP
EGFR: 99 ML/MIN/1.73M2 — SIGNIFICANT CHANGE UP
EOSINOPHIL # BLD AUTO: 0.17 K/UL — SIGNIFICANT CHANGE UP (ref 0–0.5)
EOSINOPHIL NFR BLD AUTO: 1.6 % — SIGNIFICANT CHANGE UP (ref 0–6)
GLUCOSE SERPL-MCNC: 93 MG/DL — SIGNIFICANT CHANGE UP (ref 70–99)
GLUCOSE UR QL: NEGATIVE MG/DL — SIGNIFICANT CHANGE UP
HCG UR QL: NEGATIVE — SIGNIFICANT CHANGE UP
HCT VFR BLD CALC: 36.1 % — SIGNIFICANT CHANGE UP (ref 34.5–45)
HGB BLD-MCNC: 12.1 G/DL — SIGNIFICANT CHANGE UP (ref 11.5–15.5)
IMM GRANULOCYTES NFR BLD AUTO: 0.4 % — SIGNIFICANT CHANGE UP (ref 0–0.9)
INR BLD: 0.99 RATIO — SIGNIFICANT CHANGE UP (ref 0.88–1.16)
KETONES UR-MCNC: NEGATIVE — SIGNIFICANT CHANGE UP
LACTATE SERPL-SCNC: 0.7 MMOL/L — SIGNIFICANT CHANGE UP (ref 0.7–2)
LEUKOCYTE ESTERASE UR-ACNC: NEGATIVE — SIGNIFICANT CHANGE UP
LIDOCAIN IGE QN: 85 U/L — SIGNIFICANT CHANGE UP (ref 73–393)
LYMPHOCYTES # BLD AUTO: 1.01 K/UL — SIGNIFICANT CHANGE UP (ref 1–3.3)
LYMPHOCYTES # BLD AUTO: 9.4 % — LOW (ref 13–44)
MCHC RBC-ENTMCNC: 28.5 PG — SIGNIFICANT CHANGE UP (ref 27–34)
MCHC RBC-ENTMCNC: 33.5 GM/DL — SIGNIFICANT CHANGE UP (ref 32–36)
MCV RBC AUTO: 85.1 FL — SIGNIFICANT CHANGE UP (ref 80–100)
MONOCYTES # BLD AUTO: 0.89 K/UL — SIGNIFICANT CHANGE UP (ref 0–0.9)
MONOCYTES NFR BLD AUTO: 8.3 % — SIGNIFICANT CHANGE UP (ref 2–14)
NEUTROPHILS # BLD AUTO: 8.58 K/UL — HIGH (ref 1.8–7.4)
NEUTROPHILS NFR BLD AUTO: 79.8 % — HIGH (ref 43–77)
NITRITE UR-MCNC: NEGATIVE — SIGNIFICANT CHANGE UP
NRBC # BLD: 0 /100 WBCS — SIGNIFICANT CHANGE UP (ref 0–0)
PH UR: 7 — SIGNIFICANT CHANGE UP (ref 5–8)
PLATELET # BLD AUTO: 244 K/UL — SIGNIFICANT CHANGE UP (ref 150–400)
POTASSIUM SERPL-MCNC: 4.2 MMOL/L — SIGNIFICANT CHANGE UP (ref 3.5–5.3)
POTASSIUM SERPL-SCNC: 4.2 MMOL/L — SIGNIFICANT CHANGE UP (ref 3.5–5.3)
PROT SERPL-MCNC: 6.7 G/DL — SIGNIFICANT CHANGE UP (ref 6–8.3)
PROT UR-MCNC: NEGATIVE MG/DL — SIGNIFICANT CHANGE UP
PROTHROM AB SERPL-ACNC: 11.7 SEC — SIGNIFICANT CHANGE UP (ref 10.5–13.4)
RBC # BLD: 4.24 M/UL — SIGNIFICANT CHANGE UP (ref 3.8–5.2)
RBC # FLD: 15.5 % — HIGH (ref 10.3–14.5)
SARS-COV-2 RNA SPEC QL NAA+PROBE: SIGNIFICANT CHANGE UP
SODIUM SERPL-SCNC: 137 MMOL/L — SIGNIFICANT CHANGE UP (ref 135–145)
SP GR SPEC: 1.01 — SIGNIFICANT CHANGE UP (ref 1.01–1.02)
UROBILINOGEN FLD QL: NEGATIVE MG/DL — SIGNIFICANT CHANGE UP
WBC # BLD: 10.74 K/UL — HIGH (ref 3.8–10.5)
WBC # FLD AUTO: 10.74 K/UL — HIGH (ref 3.8–10.5)

## 2022-11-10 PROCEDURE — 99223 1ST HOSP IP/OBS HIGH 75: CPT | Mod: 25

## 2022-11-10 PROCEDURE — 74019 RADEX ABDOMEN 2 VIEWS: CPT | Mod: 26

## 2022-11-10 PROCEDURE — 44180 LAP ENTEROLYSIS: CPT | Mod: 22

## 2022-11-10 PROCEDURE — 43235 EGD DIAGNOSTIC BRUSH WASH: CPT

## 2022-11-10 PROCEDURE — 44213 LAP MOBIL SPLENIC FL ADD-ON: CPT

## 2022-11-10 PROCEDURE — 99285 EMERGENCY DEPT VISIT HI MDM: CPT

## 2022-11-10 PROCEDURE — 44180 LAP ENTEROLYSIS: CPT | Mod: 80

## 2022-11-10 DEVICE — CLIP APPLIER ETHICON LIGAMAX 5MM: Type: IMPLANTABLE DEVICE | Status: FUNCTIONAL

## 2022-11-10 DEVICE — SPONGE HSTAT SURGICEL 2X14": Type: IMPLANTABLE DEVICE | Status: FUNCTIONAL

## 2022-11-10 RX ORDER — ACETAMINOPHEN 500 MG
1000 TABLET ORAL ONCE
Refills: 0 | Status: DISCONTINUED | OUTPATIENT
Start: 2022-11-10 | End: 2022-11-10

## 2022-11-10 RX ORDER — ONDANSETRON 8 MG/1
4 TABLET, FILM COATED ORAL ONCE
Refills: 0 | Status: COMPLETED | OUTPATIENT
Start: 2022-11-10 | End: 2022-11-10

## 2022-11-10 RX ORDER — OXYCODONE HYDROCHLORIDE 5 MG/1
5 TABLET ORAL ONCE
Refills: 0 | Status: DISCONTINUED | OUTPATIENT
Start: 2022-11-10 | End: 2022-11-10

## 2022-11-10 RX ORDER — ATOMOXETINE HYDROCHLORIDE 10 MG/1
1 CAPSULE ORAL
Qty: 0 | Refills: 0 | DISCHARGE

## 2022-11-10 RX ORDER — SODIUM CHLORIDE 9 MG/ML
1000 INJECTION, SOLUTION INTRAVENOUS
Refills: 0 | Status: DISCONTINUED | OUTPATIENT
Start: 2022-11-10 | End: 2022-11-10

## 2022-11-10 RX ORDER — ONDANSETRON 8 MG/1
4 TABLET, FILM COATED ORAL ONCE
Refills: 0 | Status: DISCONTINUED | OUTPATIENT
Start: 2022-11-10 | End: 2022-11-10

## 2022-11-10 RX ORDER — SIMETHICONE 80 MG/1
80 TABLET, CHEWABLE ORAL EVERY 8 HOURS
Refills: 0 | Status: DISCONTINUED | OUTPATIENT
Start: 2022-11-10 | End: 2022-11-11

## 2022-11-10 RX ORDER — ACETAMINOPHEN 500 MG
1000 TABLET ORAL ONCE
Refills: 0 | Status: COMPLETED | OUTPATIENT
Start: 2022-11-10 | End: 2022-11-10

## 2022-11-10 RX ORDER — HYDROMORPHONE HYDROCHLORIDE 2 MG/ML
0.5 INJECTION INTRAMUSCULAR; INTRAVENOUS; SUBCUTANEOUS EVERY 4 HOURS
Refills: 0 | Status: DISCONTINUED | OUTPATIENT
Start: 2022-11-10 | End: 2022-11-11

## 2022-11-10 RX ORDER — HYDROMORPHONE HYDROCHLORIDE 2 MG/ML
0.5 INJECTION INTRAMUSCULAR; INTRAVENOUS; SUBCUTANEOUS
Refills: 0 | Status: DISCONTINUED | OUTPATIENT
Start: 2022-11-10 | End: 2022-11-10

## 2022-11-10 RX ORDER — LINACLOTIDE 145 UG/1
1 CAPSULE, GELATIN COATED ORAL
Qty: 0 | Refills: 0 | DISCHARGE

## 2022-11-10 RX ORDER — HYOSCYAMINE SULFATE 0.13 MG
0.12 TABLET ORAL EVERY 6 HOURS
Refills: 0 | Status: DISCONTINUED | OUTPATIENT
Start: 2022-11-10 | End: 2022-11-11

## 2022-11-10 RX ORDER — ONDANSETRON 8 MG/1
4 TABLET, FILM COATED ORAL EVERY 6 HOURS
Refills: 0 | Status: DISCONTINUED | OUTPATIENT
Start: 2022-11-10 | End: 2022-11-11

## 2022-11-10 RX ORDER — PANTOPRAZOLE SODIUM 20 MG/1
40 TABLET, DELAYED RELEASE ORAL DAILY
Refills: 0 | Status: DISCONTINUED | OUTPATIENT
Start: 2022-11-10 | End: 2022-11-11

## 2022-11-10 RX ORDER — PANTOPRAZOLE SODIUM 20 MG/1
40 TABLET, DELAYED RELEASE ORAL DAILY
Refills: 0 | Status: DISCONTINUED | OUTPATIENT
Start: 2022-11-10 | End: 2022-11-10

## 2022-11-10 RX ORDER — BUPROPION HYDROCHLORIDE 150 MG/1
1 TABLET, EXTENDED RELEASE ORAL
Qty: 0 | Refills: 0 | DISCHARGE

## 2022-11-10 RX ORDER — FAMOTIDINE 10 MG/ML
20 INJECTION INTRAVENOUS ONCE
Refills: 0 | Status: COMPLETED | OUTPATIENT
Start: 2022-11-10 | End: 2022-11-10

## 2022-11-10 RX ORDER — NORETHINDRONE AND ETHINYL ESTRADIOL 0.4-0.035
1 KIT ORAL
Qty: 0 | Refills: 0 | DISCHARGE

## 2022-11-10 RX ORDER — SODIUM CHLORIDE 9 MG/ML
1000 INJECTION INTRAMUSCULAR; INTRAVENOUS; SUBCUTANEOUS ONCE
Refills: 0 | Status: COMPLETED | OUTPATIENT
Start: 2022-11-10 | End: 2022-11-10

## 2022-11-10 RX ORDER — ACETAMINOPHEN 500 MG
1000 TABLET ORAL EVERY 6 HOURS
Refills: 0 | Status: DISCONTINUED | OUTPATIENT
Start: 2022-11-11 | End: 2022-11-11

## 2022-11-10 RX ORDER — ESOMEPRAZOLE MAGNESIUM 40 MG/1
1 CAPSULE, DELAYED RELEASE ORAL
Qty: 0 | Refills: 0 | DISCHARGE

## 2022-11-10 RX ORDER — HYDROMORPHONE HYDROCHLORIDE 2 MG/ML
1 INJECTION INTRAMUSCULAR; INTRAVENOUS; SUBCUTANEOUS
Refills: 0 | Status: DISCONTINUED | OUTPATIENT
Start: 2022-11-10 | End: 2022-11-10

## 2022-11-10 RX ORDER — SODIUM CHLORIDE 9 MG/ML
1000 INJECTION, SOLUTION INTRAVENOUS
Refills: 0 | Status: DISCONTINUED | OUTPATIENT
Start: 2022-11-10 | End: 2022-11-11

## 2022-11-10 RX ORDER — CLONAZEPAM 1 MG
1 TABLET ORAL
Qty: 0 | Refills: 0 | DISCHARGE

## 2022-11-10 RX ORDER — FLUVOXAMINE MALEATE 25 MG/1
1 TABLET ORAL
Qty: 0 | Refills: 0 | DISCHARGE

## 2022-11-10 RX ORDER — IBUPROFEN 200 MG
800 TABLET ORAL EVERY 6 HOURS
Refills: 0 | Status: DISCONTINUED | OUTPATIENT
Start: 2022-11-10 | End: 2022-11-11

## 2022-11-10 RX ORDER — ACETAMINOPHEN 500 MG
1000 TABLET ORAL EVERY 6 HOURS
Refills: 0 | Status: COMPLETED | OUTPATIENT
Start: 2022-11-10 | End: 2022-11-11

## 2022-11-10 RX ORDER — TOPIRAMATE 25 MG
1 TABLET ORAL
Qty: 0 | Refills: 0 | DISCHARGE

## 2022-11-10 RX ORDER — PANTOPRAZOLE SODIUM 20 MG/1
1 TABLET, DELAYED RELEASE ORAL
Qty: 0 | Refills: 0 | DISCHARGE

## 2022-11-10 RX ADMIN — ONDANSETRON 4 MILLIGRAM(S): 8 TABLET, FILM COATED ORAL at 10:45

## 2022-11-10 RX ADMIN — Medication 1000 MILLIGRAM(S): at 12:36

## 2022-11-10 RX ADMIN — HYDROMORPHONE HYDROCHLORIDE 1 MILLIGRAM(S): 2 INJECTION INTRAMUSCULAR; INTRAVENOUS; SUBCUTANEOUS at 18:27

## 2022-11-10 RX ADMIN — SODIUM CHLORIDE 1000 MILLILITER(S): 9 INJECTION INTRAMUSCULAR; INTRAVENOUS; SUBCUTANEOUS at 10:45

## 2022-11-10 RX ADMIN — SODIUM CHLORIDE 75 MILLILITER(S): 9 INJECTION, SOLUTION INTRAVENOUS at 18:53

## 2022-11-10 RX ADMIN — Medication 400 MILLIGRAM(S): at 22:00

## 2022-11-10 RX ADMIN — Medication 1000 MILLIGRAM(S): at 12:10

## 2022-11-10 RX ADMIN — Medication 1000 MILLIGRAM(S): at 22:00

## 2022-11-10 RX ADMIN — Medication 400 MILLIGRAM(S): at 11:48

## 2022-11-10 RX ADMIN — ONDANSETRON 4 MILLIGRAM(S): 8 TABLET, FILM COATED ORAL at 23:35

## 2022-11-10 RX ADMIN — HYDROMORPHONE HYDROCHLORIDE 1 MILLIGRAM(S): 2 INJECTION INTRAMUSCULAR; INTRAVENOUS; SUBCUTANEOUS at 18:04

## 2022-11-10 RX ADMIN — Medication 400 MILLIGRAM(S): at 18:54

## 2022-11-10 RX ADMIN — SODIUM CHLORIDE 1000 MILLILITER(S): 9 INJECTION INTRAMUSCULAR; INTRAVENOUS; SUBCUTANEOUS at 11:55

## 2022-11-10 RX ADMIN — FAMOTIDINE 20 MILLIGRAM(S): 10 INJECTION INTRAVENOUS at 10:45

## 2022-11-10 NOTE — BRIEF OPERATIVE NOTE - OPERATION/FINDINGS
Please follow up with your Primary care provider within 2-5 days if your signs and symptoms have not resolved or worsen.     If your condition worsens or fails to improve we recommend that you receive another evaluation at the emergency room immediately or contact your primary medical clinic to discuss your concerns.   You must understand that you have received an Urgent Care treatment only and that you may be released before all of your medical problems are known or treated. You, the patient, will arrange for follow up care as instructed.     RED FLAGS/WARNING SYMPTOMS DISCUSSED WITH PATIENT THAT WOULD WARRANT EMERGENT MEDICAL ATTENTION. PATIENT VERBALIZED UNDERSTANDING.      Consistent with extensive adhesive bands

## 2022-11-10 NOTE — H&P ADULT - NSICDXPASTSURGICALHX_GEN_ALL_CORE_FT
PAST SURGICAL HISTORY:  History of augmentation of both breasts 8/ 2014    History of laparoscopy 2021 for hiatal hernia repair, SBO and Lysis of adhesions    History of sleeve gastrectomy 5/ 2012    S/P endoscopy upper - 10/2019    S/P gastric bypass 01/2020

## 2022-11-10 NOTE — ED ADULT NURSE NOTE - NSICDXPASTMEDICALHX_GEN_ALL_CORE_FT
PAST MEDICAL HISTORY:  Anxiety and depression     Chronic GERD     Constipation     History of anemia     History of asthma as a child    History of obesity     Migraines

## 2022-11-10 NOTE — ED ADULT TRIAGE NOTE - CHIEF COMPLAINT QUOTE
" I have abdominal pain, mostly mid and RUQ on and off x 2 months, constant x 2 days, nauseated, Hx Sleeve/ Gastric bypass/ SBO in the past "

## 2022-11-10 NOTE — ED ADULT NURSE NOTE - OBJECTIVE STATEMENT
patient has c/o abdominal pain with nausea and constipation x 3 days, hx of gastro sleeve and bowel obstruction, Patient denies sick contacts/ no fever/chills/cough at this time, denies SOB and no acute distress. last bm was today with enema, IV accessed and tolerating. Labs drawn & sent results pending. will continue to monitor.

## 2022-11-10 NOTE — ED PROVIDER NOTE - OBJECTIVE STATEMENT
26 yo female with h/o multiple abdominal surgeries including gastric sleeve 10 years followed by converted to gastric bypass, SBO with lysis of adhesions presents to the ED c/o intermittent abdominal pain x 2 months however worse and constant x 2 days. Pain worse after eating. Associated with nausea.  Patient had outpatient CT abd/pelvis 1 month ago and abd US 6 weeks ago. Patient states that her doctor, Dr. Mehta (bariatrics) recommended having surgery but after endoscopy. Denies fever, chills, chest pain, sob, vomiting, diarrhea, urinary sxs, flank pain.

## 2022-11-10 NOTE — H&P ADULT - HISTORY OF PRESENT ILLNESS
27 year old female with history of sleeve gastrectomy in 2012, conversion to gastric bypass secondary to severe GERD in 2020 and laparoscopy one year later for hiatal hernia, SBO and lysis of adhesions, presented to [place] ER with complaint of 7/10 abdominal pain that has been intermittent for the past 5 months and at that time was related to eating and associated with diaphoresis, now constant for the past 3 days.  Admits to flatus, had BM today following self administered enema.  Admits to nausea, denies vomiting.

## 2022-11-10 NOTE — ED PROVIDER NOTE - CLINICAL SUMMARY MEDICAL DECISION MAKING FREE TEXT BOX
Patient with history of gastric sleeve was revised to gastric bypass and SBO with lysis of adhesions complaining of approximately 2 months of intermittent epigastric/right upper quadrant abdominal pain which has been constant for the past 3 days.  Patient relates pain worse with eating, and is associated with nausea.  Patient denies fevers chills vomiting diarrhea dysuria hematuria frequency.  Patient relates she has had an ultrasound and CAT scan within the past 6 weeks for these symptoms.  Patient relates she was recommended by Dr. Mehta (bariatrics) to have surgery, but to have an endoscopy first.  Patient relates she was unable to schedule an endoscopy before February.  Plan labs IV fluid Pepcid Zofran bariatric consult

## 2022-11-10 NOTE — PATIENT PROFILE ADULT - FALL HARM RISK - UNIVERSAL INTERVENTIONS
Bed in lowest position, wheels locked, appropriate side rails in place/Call bell, personal items and telephone in reach/Instruct patient to call for assistance before getting out of bed or chair/Non-slip footwear when patient is out of bed/[place] to call system/Physically safe environment - no spills, clutter or unnecessary equipment/Purposeful Proactive Rounding/Room/bathroom lighting operational, light cord in reach

## 2022-11-10 NOTE — H&P ADULT - ASSESSMENT
27 year old female with worsening abdominal pain s/p guerrero en y gastric bypass    Plan for OR for diagnostic laparoscopy to evaluate guerrero en y gastric bypass anatomy and area of abnormality at J-J seen on CT and upper endoscopy to r/o marginal ulcer.  Case and plan D/W Dr. Mehta

## 2022-11-10 NOTE — BRIEF OPERATIVE NOTE - NSICDXBRIEFPROCEDURE_GEN_ALL_CORE_FT
PROCEDURES:  Exploratory laparoscopy 10-Nov-2022 17:56:06  Chely Jasso  Laparoscopic lysis of abdominal adhesions 10-Nov-2022 17:56:39 extensive - transverse colon omental adhesive band Chely Jasso  Mobilization, splenic flexure, laparoscopic 10-Nov-2022 17:57:35  Chely Jasso  EGD, intraoperative 10-Nov-2022 17:57:55  Chely Jasso

## 2022-11-10 NOTE — ED PROVIDER NOTE - NSICDXFAMILYHX_GEN_ALL_CORE_FT
FAMILY HISTORY:  Father  Still living? Unknown  Family history of hyperlipidemia, Age at diagnosis: Age Unknown    Mother  Still living? Unknown  Family history of anxiety disorder, Age at diagnosis: Age Unknown  Family history of depression, Age at diagnosis: Age Unknown

## 2022-11-10 NOTE — H&P ADULT - TIME BILLING
Obtaining history/physical exam, reviewing labs and imaging studies, coordinating care with multidisciplinary team and nursing staff and discussing patient with surgical PA covering. Greater than 50% of the time was spent with direct face to face patient interaction, discussing with the patient and answering all questions.

## 2022-11-10 NOTE — ED PROVIDER NOTE - PROGRESS NOTE DETAILS
d/w sylvester (bariatrics) he requests AXR and he will d/w magdy Case discussed with Dr. Mehta, recommends admission to her service for OR.

## 2022-11-10 NOTE — H&P ADULT - NS ATTEND AMEND GEN_ALL_CORE FT
I have personally seen and examined the patient.  I fully participated in the care of this patient.  I have made amendments to the documentation where necessary, and agree with the history, physical exam, impression/assessment, and plan as documented by the PA    Patient known to me from outpatient. H/o sleeve converted to RYGB now with abdominal pain. Was undergoing outpatient workup but presented to ED with severe abdominal pain. Improved after pain medication but now worsening again. OR for diagnostic laparoscopy to evaluate RYGB anatomy and EGD to r/o marginal ulcer. WIll evaluate GB for acute joan given RUQ pain but no stones seen on imaging. Discussed risks/benefits/alternatives to surgery with patient and all questions answered.

## 2022-11-10 NOTE — PRE-OP CHECKLIST - PATIENT SENT TO
Heart is squeezing normally.  Left atrium is enlarged, not uncommon given history of atrial fibrillation.  Known mitral valve prolapse with mild MR which is managed by Cardiology, please remind her to call their office for follow-up as it appears she is overdue for follow up.  No changes from my standpoint   operating room

## 2022-11-11 ENCOUNTER — TRANSCRIPTION ENCOUNTER (OUTPATIENT)
Age: 27
End: 2022-11-11

## 2022-11-11 VITALS
HEART RATE: 68 BPM | DIASTOLIC BLOOD PRESSURE: 68 MMHG | SYSTOLIC BLOOD PRESSURE: 110 MMHG | RESPIRATION RATE: 18 BRPM | TEMPERATURE: 98 F | OXYGEN SATURATION: 98 %

## 2022-11-11 LAB
ALBUMIN SERPL ELPH-MCNC: 3 G/DL — LOW (ref 3.3–5)
ALP SERPL-CCNC: 62 U/L — SIGNIFICANT CHANGE UP (ref 30–120)
ALT FLD-CCNC: 20 U/L DA — SIGNIFICANT CHANGE UP (ref 10–60)
ANION GAP SERPL CALC-SCNC: 7 MMOL/L — SIGNIFICANT CHANGE UP (ref 5–17)
AST SERPL-CCNC: 17 U/L — SIGNIFICANT CHANGE UP (ref 10–40)
BILIRUB SERPL-MCNC: 0.5 MG/DL — SIGNIFICANT CHANGE UP (ref 0.2–1.2)
BUN SERPL-MCNC: 7 MG/DL — SIGNIFICANT CHANGE UP (ref 7–23)
CALCIUM SERPL-MCNC: 8.6 MG/DL — SIGNIFICANT CHANGE UP (ref 8.4–10.5)
CHLORIDE SERPL-SCNC: 104 MMOL/L — SIGNIFICANT CHANGE UP (ref 96–108)
CO2 SERPL-SCNC: 26 MMOL/L — SIGNIFICANT CHANGE UP (ref 22–31)
CREAT SERPL-MCNC: 0.9 MG/DL — SIGNIFICANT CHANGE UP (ref 0.5–1.3)
CULTURE RESULTS: SIGNIFICANT CHANGE UP
EGFR: 89 ML/MIN/1.73M2 — SIGNIFICANT CHANGE UP
GLUCOSE SERPL-MCNC: 108 MG/DL — HIGH (ref 70–99)
HCT VFR BLD CALC: 33.5 % — LOW (ref 34.5–45)
HGB BLD-MCNC: 11 G/DL — LOW (ref 11.5–15.5)
MCHC RBC-ENTMCNC: 28.2 PG — SIGNIFICANT CHANGE UP (ref 27–34)
MCHC RBC-ENTMCNC: 32.8 GM/DL — SIGNIFICANT CHANGE UP (ref 32–36)
MCV RBC AUTO: 85.9 FL — SIGNIFICANT CHANGE UP (ref 80–100)
NRBC # BLD: 0 /100 WBCS — SIGNIFICANT CHANGE UP (ref 0–0)
PLATELET # BLD AUTO: 227 K/UL — SIGNIFICANT CHANGE UP (ref 150–400)
POTASSIUM SERPL-MCNC: 5.1 MMOL/L — SIGNIFICANT CHANGE UP (ref 3.5–5.3)
POTASSIUM SERPL-SCNC: 5.1 MMOL/L — SIGNIFICANT CHANGE UP (ref 3.5–5.3)
PROT SERPL-MCNC: 5.9 G/DL — LOW (ref 6–8.3)
RBC # BLD: 3.9 M/UL — SIGNIFICANT CHANGE UP (ref 3.8–5.2)
RBC # FLD: 15.6 % — HIGH (ref 10.3–14.5)
SODIUM SERPL-SCNC: 137 MMOL/L — SIGNIFICANT CHANGE UP (ref 135–145)
SPECIMEN SOURCE: SIGNIFICANT CHANGE UP
UFH PPP CHRO-ACNC: 0.05 IU/ML — LOW (ref 0.3–0.7)
WBC # BLD: 8.74 K/UL — SIGNIFICANT CHANGE UP (ref 3.8–10.5)
WBC # FLD AUTO: 8.74 K/UL — SIGNIFICANT CHANGE UP (ref 3.8–10.5)

## 2022-11-11 PROCEDURE — 96375 TX/PRO/DX INJ NEW DRUG ADDON: CPT

## 2022-11-11 PROCEDURE — 86901 BLOOD TYPING SEROLOGIC RH(D): CPT

## 2022-11-11 PROCEDURE — 81003 URINALYSIS AUTO W/O SCOPE: CPT

## 2022-11-11 PROCEDURE — 99285 EMERGENCY DEPT VISIT HI MDM: CPT | Mod: 25

## 2022-11-11 PROCEDURE — 86900 BLOOD TYPING SEROLOGIC ABO: CPT

## 2022-11-11 PROCEDURE — 86850 RBC ANTIBODY SCREEN: CPT

## 2022-11-11 PROCEDURE — 85027 COMPLETE CBC AUTOMATED: CPT

## 2022-11-11 PROCEDURE — 85730 THROMBOPLASTIN TIME PARTIAL: CPT

## 2022-11-11 PROCEDURE — 85520 HEPARIN ASSAY: CPT

## 2022-11-11 PROCEDURE — 87086 URINE CULTURE/COLONY COUNT: CPT

## 2022-11-11 PROCEDURE — 96365 THER/PROPH/DIAG IV INF INIT: CPT

## 2022-11-11 PROCEDURE — 81025 URINE PREGNANCY TEST: CPT

## 2022-11-11 PROCEDURE — 83605 ASSAY OF LACTIC ACID: CPT

## 2022-11-11 PROCEDURE — 80053 COMPREHEN METABOLIC PANEL: CPT

## 2022-11-11 PROCEDURE — 36415 COLL VENOUS BLD VENIPUNCTURE: CPT

## 2022-11-11 PROCEDURE — 83690 ASSAY OF LIPASE: CPT

## 2022-11-11 PROCEDURE — 74019 RADEX ABDOMEN 2 VIEWS: CPT

## 2022-11-11 PROCEDURE — C1889: CPT

## 2022-11-11 PROCEDURE — 87635 SARS-COV-2 COVID-19 AMP PRB: CPT

## 2022-11-11 PROCEDURE — 85025 COMPLETE CBC W/AUTO DIFF WBC: CPT

## 2022-11-11 PROCEDURE — 85610 PROTHROMBIN TIME: CPT

## 2022-11-11 RX ORDER — OXYCODONE HYDROCHLORIDE 5 MG/1
1 TABLET ORAL
Qty: 5 | Refills: 0
Start: 2022-11-11 | End: 2022-11-11

## 2022-11-11 RX ORDER — ENOXAPARIN SODIUM 100 MG/ML
30 INJECTION SUBCUTANEOUS EVERY 24 HOURS
Refills: 0 | Status: DISCONTINUED | OUTPATIENT
Start: 2022-11-11 | End: 2022-11-11

## 2022-11-11 RX ADMIN — ENOXAPARIN SODIUM 30 MILLIGRAM(S): 100 INJECTION SUBCUTANEOUS at 09:56

## 2022-11-11 RX ADMIN — Medication 400 MILLIGRAM(S): at 05:04

## 2022-11-11 RX ADMIN — PANTOPRAZOLE SODIUM 40 MILLIGRAM(S): 20 TABLET, DELAYED RELEASE ORAL at 12:15

## 2022-11-11 RX ADMIN — Medication 1000 MILLIGRAM(S): at 11:55

## 2022-11-11 RX ADMIN — Medication 800 MILLIGRAM(S): at 05:39

## 2022-11-11 RX ADMIN — ONDANSETRON 4 MILLIGRAM(S): 8 TABLET, FILM COATED ORAL at 05:08

## 2022-11-11 RX ADMIN — Medication 400 MILLIGRAM(S): at 04:08

## 2022-11-11 RX ADMIN — Medication 400 MILLIGRAM(S): at 11:48

## 2022-11-11 RX ADMIN — Medication 1000 MILLIGRAM(S): at 04:12

## 2022-11-11 RX ADMIN — Medication 400 MILLIGRAM(S): at 14:12

## 2022-11-11 RX ADMIN — ONDANSETRON 4 MILLIGRAM(S): 8 TABLET, FILM COATED ORAL at 12:15

## 2022-11-11 NOTE — DISCHARGE NOTE PROVIDER - NSDCFUSCHEDAPPT_GEN_ALL_CORE_FT
Maikel Lu  [facility] Physician [facility]  PSYCHIATRY [address] [facility]   Scheduled Appointment: 12/06/2022    Oneil Carrington  [facility]  GASTRO  [address]  Scheduled Appointment: 01/11/2023

## 2022-11-11 NOTE — DIETITIAN INITIAL EVALUATION ADULT - OTHER INFO
Per H&P, pt is a "27 year old female with history of sleeve gastrectomy in 2012, conversion to gastric bypass secondary to severe GERD in 2020 and laparoscopy one year later for hiatal hernia, SBO and lysis of adhesions, presented to [place] ER with complaint of 7/10 abdominal pain that has been intermittent for the past 5 months and at that time was related to eating and associated with diaphoresis, now constant for the past 3 days.  Admits to flatus, had BM today following self administered enema.  Admits to nausea, denies vomiting."    Nutrition consult ordered for hx bariatric sx.  Pt admitted with intermittent abd pain x 5 month, worse with po intake/meal consumption.  Pt s/p ex lap with lysis of adhesions (noted pt with hx HH, SBO, RAIZA.  Bariatric clear liquid diet initiated and pt tolerated - diet progressed to phase 3 bariatric regular diet.  Pt reports she was able to consume 1/2 sandwich, pudding, broth from soup.  PTA pt endorses consuming ~4x smaller meals per day; states certain foods she was unable to tolerate (i.e red meat, spicy food (also hx gerd), limiting salad/uncooked vegetables).  Reports consuming cottage cheese, eggs, sometimes soy products, beans with overall good tolerance.  Pt pending d/c, likely later today.  Pending HIDA scan as outpatient; reinforced importance of limiting high fat foods; pt able to verbalize understanding.

## 2022-11-11 NOTE — PROGRESS NOTE ADULT - ASSESSMENT
A/P: POD#1, progressing well, persistent RUQ/epigastric pain,    Continue current care  f/u HIDA r/o biliary dyskinesia  Diet - keep NPO for HIDA then resume Bariatric clears  GI/DVT prophylaxis  Analgesia prn  OOB/ambulation on the floor  Incentive spirometry/Cough/Deep breathing exercises  AM labs    Case & plan discussed with Dr. NIMA Mehta and patient's nurse.

## 2022-11-11 NOTE — DIETITIAN INITIAL EVALUATION ADULT - PERTINENT MEDS FT
MEDICATIONS  (STANDING):  enoxaparin Injectable 30 milliGRAM(s) SubCutaneous every 24 hours  lactated ringers. 1000 milliLiter(s) (100 mL/Hr) IV Continuous <Continuous>  ondansetron Injectable 4 milliGRAM(s) IV Push every 6 hours  pantoprazole  Injectable 40 milliGRAM(s) IV Push daily    MEDICATIONS  (PRN):  acetaminophen   IVPB .. 1000 milliGRAM(s) IV Intermittent every 6 hours PRN Mild Pain (1 - 3)  HYDROmorphone  Injectable 0.5 milliGRAM(s) IV Push every 4 hours PRN Severe Pain (7 - 10)  hyoscyamine SL 0.125 milliGRAM(s) SubLingual every 6 hours PRN Nausea and/or vomiting  ibuprofen IVPB .. 800 milliGRAM(s) IV Intermittent every 6 hours PRN Moderate Pain (4 - 6)  simethicone 80 milliGRAM(s) Chew every 8 hours PRN Gas

## 2022-11-11 NOTE — PROGRESS NOTE ADULT - NS ATTEND AMEND GEN_ALL_CORE FT
I have personally seen and examined the patient.  I fully participated in the care of this patient.  I have made amendments to the documentation where necessary, and agree with the history, physical exam, impression/assessment, and plan as documented by the PA    Patient reports the severe pain she had presented to the hospital with has improved. However, having RUQ pain unsure if it is incisional or internal.   TOlerating beau clears  Passing flatus  Abdomen soft, nondistended, appropriately tender    POD 1 s/p diagnostic laparoscopy, extensive RAIZA and mobilization of splenic flexure   -advance to beau reg diet  -multimodal pain control  -will set up for CCK hida as outpatient to r/o biliary dyskinesia - no gallstones seen on imaging  -OOB/ambulate  -d/c planning if tolerated bariatric regular diet

## 2022-11-11 NOTE — DISCHARGE NOTE NURSING/CASE MANAGEMENT/SOCIAL WORK - NSDCFUADDAPPT_GEN_ALL_CORE_FT
FOLLOW-UP WITH [facility] RADIOLOGY (enter via front entrance) ON MONDAY, NOVEMBER 14th AT 8am FOR HIDA SCAN WITH CCK.    TO PREPARE FOR THIS EXAM, PLEASE AVOID EATING OR DRINKING AFTER MIDNIGHT THE NIGHT BEFORE THE EXAM.

## 2022-11-11 NOTE — DIETITIAN INITIAL EVALUATION ADULT - SIGNS/SYMPTOMS
as evidenced by intermittent abd pain x 5m, hx sleeve/revision to gastric bypass, s/p ex lap w/RAIZA

## 2022-11-11 NOTE — DISCHARGE NOTE PROVIDER - HOSPITAL COURSE
28 y/o WF with PMHx of anxiety/depression, anemia, GERD, h/o sleeve gastrectomy to GBP admitted to [facility] on 11/10/2022 with abdominal pain and underwent exploratory laparoscopy with lysis of adhesions, EGD. Patient tolerated procedure well and progressed appropriately. Currently tolerating Bariatric regular diet, voiding independently, passing flatus and ambulating. Patient instructed to return to [facility] on Monday, Nov 14 for scheduled HIDA scan with CCK to evaluate for biliary dyskinesia.  Discharged on 11/11/2022 with home medications, incentive spirometer, and analgesia prescriptions to follow-up with SURGEON/Dr. NIMA Mehta in 7-10 days.

## 2022-11-11 NOTE — DISCHARGE NOTE PROVIDER - CARE PROVIDER_API CALL
Prudence Mehta)  Surgery; Surgical Critical Care  [address] [place] [address]  Phone: [phone]  Fax: [phone]  Follow Up Time: 1 week

## 2022-11-11 NOTE — DIETITIAN INITIAL EVALUATION ADULT - PERTINENT LABORATORY DATA
11-11    137  |  104  |  7   ----------------------------<  108<H>  5.1   |  26  |  0.90    Ca    8.6      11 Nov 2022 06:00    TPro  5.9<L>  /  Alb  3.0<L>  /  TBili  0.5  /  DBili  x   /  AST  17  /  ALT  20  /  AlkPhos  62  11-11

## 2022-11-11 NOTE — PROGRESS NOTE ADULT - SUBJECTIVE AND OBJECTIVE BOX
SURGERY PA NOTE - POD#1    28 y/o WF with PMHx of anxiety/depression, h/o gastric sleeve to GBP, RAIZA, hiatal hernia repair, admitted with epigastric/RUQ pain for several months, s/p exploratory laparoscopy with RAIZA, EGD on 11/10/2022     SUBJECTIVE:  Patient seen at beside, no overnight events, no complaints at this time.  Reports pain is well-controlled, "the same as before surgery"..  Patient admits to tolerating clear liquid diet, flatus, no bowel movement, voiding independently, ambulating.  Patient denies chest pain, shortness of breath, headache, dizziness, fever/chills, dysuria, nausea, vomiting, diarrhea.    OBJECTIVE:   T(F): 98.1 (22 @ 07:48), Max: 98.5 (11-10-22 @ 23:15)  HR: 61 (22 @ 07:48) (61 - 88)  BP: 115/69 (22 @ 07:48) (98/57 - 115/69)  RR: 18 (22 @ 07:48) (11 - 18)  SpO2: 100% (22 @ 07:48) (97% - 100%)      I&O's Detail  10 Nov 2022 07:01  -  2022 07:00  --------------------------------------------------------  IN:    IV PiggyBack: 200 mL    Lactated Ringers: 1700 mL  Total IN: 1900 mL  OUT:    Blood Loss (mL): 10 mL    Voided (mL): 400 mL  Total OUT: 410 mL  Total NET: 1490 mL      PHYSICAL EXAM:  General: A+O x 3, NAD  HEENT: PERRLA, EOMs intact, non-icteric  Chest: Clear to auscultation bilaterally, no rales/rhonchi/wheezes noted  Heart: S1, S2 clear, RRR  Abdomen: soft, non-distended, mild RUQ/epigastric pain on deep palpation without guarding, +BS, no rebound, no CVA noted, incisions clean, steristrips intact  Extremities: nonedematous bilaterally, warm, no calf tenderness noted     MEDICATIONS  (STANDING):  acetaminophen   IVPB .. 1000 milliGRAM(s) IV Intermittent every 6 hours  enoxaparin Injectable 30 milliGRAM(s) SubCutaneous every 24 hours  lactated ringers. 1000 milliLiter(s) (100 mL/Hr) IV Continuous <Continuous>  ondansetron Injectable 4 milliGRAM(s) IV Push every 6 hours  pantoprazole  Injectable 40 milliGRAM(s) IV Push daily    MEDICATIONS  (PRN):  acetaminophen   IVPB .. 1000 milliGRAM(s) IV Intermittent every 6 hours PRN Mild Pain (1 - 3)  HYDROmorphone  Injectable 0.5 milliGRAM(s) IV Push every 4 hours PRN Severe Pain (7 - 10)  hyoscyamine SL 0.125 milliGRAM(s) SubLingual every 6 hours PRN Nausea and/or vomiting  ibuprofen IVPB .. 800 milliGRAM(s) IV Intermittent every 6 hours PRN Moderate Pain (4 - 6)  simethicone 80 milliGRAM(s) Chew every 8 hours PRN Gas      LABS:                        11.0   8.74  )-----------( 227      ( 2022 06:00 )             33.5     11    137  |  104  |  7   ----------------------------<  108<H>  5.1   |  26  |  0.90    Ca    8.6      2022 06:00    TPro  5.9<L>  /  Alb  3.0<L>  /  TBili  0.5  /  DBili  x   /  AST  17  /  ALT  20  /  AlkPhos  62  11-11    PT/INR - ( 10 Nov 2022 14:17 )   PT: 11.7 sec;   INR: 0.99 ratio         PTT - ( 10 Nov 2022 14:17 )  PTT:29.6 sec  Urinalysis Basic - ( 10 Nov 2022 10:34 )    Color: Yellow / Appearance: Clear / S.010 / pH: x  Gluc: x / Ketone: Negative  / Bili: Negative / Urobili: Negative mg/dL   Blood: x / Protein: Negative mg/dL / Nitrite: Negative   Leuk Esterase: Negative / RBC: x / WBC x   Sq Epi: x / Non Sq Epi: x / Bacteria: x

## 2022-11-11 NOTE — DISCHARGE NOTE PROVIDER - NSDCCPTREATMENT_GEN_ALL_CORE_FT
PRINCIPAL PROCEDURE  Procedure: Exploratory laparoscopy  Findings and Treatment:       SECONDARY PROCEDURE  Procedure: Laparoscopic lysis of abdominal adhesions  Findings and Treatment: extensive - transverse colon omental adhesive band

## 2022-11-11 NOTE — DISCHARGE NOTE PROVIDER - NSDCFUADDINST_GEN_ALL_CORE_FT
Avoid foods high in fat.  Increase ambulation and utilize incentive spirometry frequently.   Apply ice packs to abdominal wall/incision sites for 20 mins on/20 mins off every 4 hours for the next 24 hours and to shoulders as needed for discomfort.   Avoid heavy lifting in excess of 20-25 pounds and strenuous activities for duration of 4-6 weeks.  Take over-the-counter acetaminophen (Tylenol) for mild to moderate pain.  Call office with any questions or concerns.

## 2022-11-11 NOTE — DISCHARGE NOTE PROVIDER - NSDCMRMEDTOKEN_GEN_ALL_CORE_FT
Blisovi 24 FE oral tablet: 1 tab(s) orally once a day  Linzess 290 mcg oral capsule: 1 cap(s) orally once a day  oxyCODONE 5 mg oral tablet: 1 tab(s) orally every 6 hours, As Needed -for severe pain MDD:4   Protonix 40 mg oral delayed release tablet: 1 tab(s) orally once a day  Strattera 100 mg oral capsule: 1 cap(s) orally once a day (in the morning)  Wellbutrin  mg/24 hours oral tablet, extended release: 1 tab(s) orally every 24 hours

## 2022-11-11 NOTE — DISCHARGE NOTE PROVIDER - NSDCFUADDAPPT_GEN_ALL_CORE_FT
FOLLOW-UP WITH [facility] RADIOLOGY (enter via front entrance) ON MONDAY, NOVEMBER 14th AT 8am FOR HIDA SCAN WITH CCK.    TO PREPARE FOR THIS EXAM, PLEASE AVOID EATING OF DRINKING AFTER MIDNIGHT THE NIGHT BEFORE THE EXAM. FOLLOW-UP WITH [facility] RADIOLOGY (enter via front entrance) ON MONDAY, NOVEMBER 14th AT 8am FOR HIDA SCAN WITH CCK.    TO PREPARE FOR THIS EXAM, PLEASE AVOID EATING OR DRINKING AFTER MIDNIGHT THE NIGHT BEFORE THE EXAM.

## 2022-11-11 NOTE — DIETITIAN INITIAL EVALUATION ADULT - PHYSCIAL ASSESSMENT
endorses intentional weight loss secondary to hx bariatric sx; reports weight loss was gradual; no significant wt loss related to intermittent abd pain x 5 m/well nourished

## 2022-11-11 NOTE — DISCHARGE NOTE NURSING/CASE MANAGEMENT/SOCIAL WORK - PATIENT PORTAL LINK FT
You can access the FollowMyHealth Patient Portal offered by [facility] by registering at the following website: [email or web address]. By joining [email or web address]’s FollowMyHealth portal, you will also be able to view your health information using other applications (apps) compatible with our system.

## 2022-11-13 ENCOUNTER — INPATIENT (INPATIENT)
Facility: HOSPITAL | Age: 27
LOS: 4 days | Discharge: HOME | DRG: 372 | End: 2022-11-18
Attending: STUDENT IN AN ORGANIZED HEALTH CARE EDUCATION/TRAINING PROGRAM | Admitting: STUDENT IN AN ORGANIZED HEALTH CARE EDUCATION/TRAINING PROGRAM
Payer: COMMERCIAL

## 2022-11-13 VITALS
RESPIRATION RATE: 20 BRPM | DIASTOLIC BLOOD PRESSURE: 86 MMHG | OXYGEN SATURATION: 98 % | TEMPERATURE: 98 F | HEIGHT: 66 IN | HEART RATE: 106 BPM | SYSTOLIC BLOOD PRESSURE: 123 MMHG | WEIGHT: 154.98 LBS

## 2022-11-13 DIAGNOSIS — Z98.890 OTHER SPECIFIED POSTPROCEDURAL STATES: Chronic | ICD-10-CM

## 2022-11-13 DIAGNOSIS — Z90.3 ACQUIRED ABSENCE OF STOMACH [PART OF]: Chronic | ICD-10-CM

## 2022-11-13 DIAGNOSIS — Z98.84 BARIATRIC SURGERY STATUS: Chronic | ICD-10-CM

## 2022-11-13 LAB
ALBUMIN SERPL ELPH-MCNC: 3.8 G/DL — SIGNIFICANT CHANGE UP (ref 3.3–5)
ALP SERPL-CCNC: 65 U/L — SIGNIFICANT CHANGE UP (ref 30–120)
ALT FLD-CCNC: 35 U/L DA — SIGNIFICANT CHANGE UP (ref 10–60)
ANION GAP SERPL CALC-SCNC: 13 MMOL/L — SIGNIFICANT CHANGE UP (ref 5–17)
APPEARANCE UR: CLEAR — SIGNIFICANT CHANGE UP
APTT BLD: 27.4 SEC — LOW (ref 27.5–35.5)
AST SERPL-CCNC: 31 U/L — SIGNIFICANT CHANGE UP (ref 10–40)
BASOPHILS # BLD AUTO: 0.06 K/UL — SIGNIFICANT CHANGE UP (ref 0–0.2)
BASOPHILS NFR BLD AUTO: 0.6 % — SIGNIFICANT CHANGE UP (ref 0–2)
BILIRUB SERPL-MCNC: 0.3 MG/DL — SIGNIFICANT CHANGE UP (ref 0.2–1.2)
BILIRUB UR-MCNC: NEGATIVE — SIGNIFICANT CHANGE UP
BLD GP AB SCN SERPL QL: SIGNIFICANT CHANGE UP
BUN SERPL-MCNC: 9 MG/DL — SIGNIFICANT CHANGE UP (ref 7–23)
CALCIUM SERPL-MCNC: 9.3 MG/DL — SIGNIFICANT CHANGE UP (ref 8.4–10.5)
CHLORIDE SERPL-SCNC: 101 MMOL/L — SIGNIFICANT CHANGE UP (ref 96–108)
CO2 SERPL-SCNC: 24 MMOL/L — SIGNIFICANT CHANGE UP (ref 22–31)
COLOR SPEC: YELLOW — SIGNIFICANT CHANGE UP
CREAT SERPL-MCNC: 0.92 MG/DL — SIGNIFICANT CHANGE UP (ref 0.5–1.3)
DIFF PNL FLD: NEGATIVE — SIGNIFICANT CHANGE UP
EGFR: 86 ML/MIN/1.73M2 — SIGNIFICANT CHANGE UP
EOSINOPHIL # BLD AUTO: 0.24 K/UL — SIGNIFICANT CHANGE UP (ref 0–0.5)
EOSINOPHIL NFR BLD AUTO: 2.5 % — SIGNIFICANT CHANGE UP (ref 0–6)
GLUCOSE SERPL-MCNC: 115 MG/DL — HIGH (ref 70–99)
GLUCOSE UR QL: NEGATIVE MG/DL — SIGNIFICANT CHANGE UP
HCG SERPL-ACNC: <1 MIU/ML — SIGNIFICANT CHANGE UP
HCG UR QL: NEGATIVE — SIGNIFICANT CHANGE UP
HCT VFR BLD CALC: 40 % — SIGNIFICANT CHANGE UP (ref 34.5–45)
HGB BLD-MCNC: 13.2 G/DL — SIGNIFICANT CHANGE UP (ref 11.5–15.5)
IMM GRANULOCYTES NFR BLD AUTO: 0.2 % — SIGNIFICANT CHANGE UP (ref 0–0.9)
INR BLD: 1.05 RATIO — SIGNIFICANT CHANGE UP (ref 0.88–1.16)
KETONES UR-MCNC: ABNORMAL
LEUKOCYTE ESTERASE UR-ACNC: NEGATIVE — SIGNIFICANT CHANGE UP
LIDOCAIN IGE QN: 142 U/L — SIGNIFICANT CHANGE UP (ref 73–393)
LYMPHOCYTES # BLD AUTO: 1.46 K/UL — SIGNIFICANT CHANGE UP (ref 1–3.3)
LYMPHOCYTES # BLD AUTO: 15.3 % — SIGNIFICANT CHANGE UP (ref 13–44)
MCHC RBC-ENTMCNC: 27.8 PG — SIGNIFICANT CHANGE UP (ref 27–34)
MCHC RBC-ENTMCNC: 33 GM/DL — SIGNIFICANT CHANGE UP (ref 32–36)
MCV RBC AUTO: 84.4 FL — SIGNIFICANT CHANGE UP (ref 80–100)
MONOCYTES # BLD AUTO: 0.81 K/UL — SIGNIFICANT CHANGE UP (ref 0–0.9)
MONOCYTES NFR BLD AUTO: 8.5 % — SIGNIFICANT CHANGE UP (ref 2–14)
NEUTROPHILS # BLD AUTO: 6.96 K/UL — SIGNIFICANT CHANGE UP (ref 1.8–7.4)
NEUTROPHILS NFR BLD AUTO: 72.9 % — SIGNIFICANT CHANGE UP (ref 43–77)
NITRITE UR-MCNC: NEGATIVE — SIGNIFICANT CHANGE UP
NRBC # BLD: 0 /100 WBCS — SIGNIFICANT CHANGE UP (ref 0–0)
PH UR: 8 — SIGNIFICANT CHANGE UP (ref 5–8)
PLATELET # BLD AUTO: 289 K/UL — SIGNIFICANT CHANGE UP (ref 150–400)
POTASSIUM SERPL-MCNC: 3.6 MMOL/L — SIGNIFICANT CHANGE UP (ref 3.5–5.3)
POTASSIUM SERPL-SCNC: 3.6 MMOL/L — SIGNIFICANT CHANGE UP (ref 3.5–5.3)
PROT SERPL-MCNC: 7.6 G/DL — SIGNIFICANT CHANGE UP (ref 6–8.3)
PROT UR-MCNC: NEGATIVE MG/DL — SIGNIFICANT CHANGE UP
PROTHROM AB SERPL-ACNC: 12.1 SEC — SIGNIFICANT CHANGE UP (ref 10.5–13.4)
RBC # BLD: 4.74 M/UL — SIGNIFICANT CHANGE UP (ref 3.8–5.2)
RBC # FLD: 15.4 % — HIGH (ref 10.3–14.5)
SARS-COV-2 RNA SPEC QL NAA+PROBE: SIGNIFICANT CHANGE UP
SODIUM SERPL-SCNC: 138 MMOL/L — SIGNIFICANT CHANGE UP (ref 135–145)
SP GR SPEC: 1.01 — SIGNIFICANT CHANGE UP (ref 1.01–1.02)
UROBILINOGEN FLD QL: NEGATIVE MG/DL — SIGNIFICANT CHANGE UP
WBC # BLD: 9.55 K/UL — SIGNIFICANT CHANGE UP (ref 3.8–10.5)
WBC # FLD AUTO: 9.55 K/UL — SIGNIFICANT CHANGE UP (ref 3.8–10.5)

## 2022-11-13 PROCEDURE — 74177 CT ABD & PELVIS W/CONTRAST: CPT | Mod: 26,MA

## 2022-11-13 PROCEDURE — 99285 EMERGENCY DEPT VISIT HI MDM: CPT

## 2022-11-13 RX ORDER — HYDROMORPHONE HYDROCHLORIDE 2 MG/ML
1 INJECTION INTRAMUSCULAR; INTRAVENOUS; SUBCUTANEOUS ONCE
Refills: 0 | Status: DISCONTINUED | OUTPATIENT
Start: 2022-11-13 | End: 2022-11-13

## 2022-11-13 RX ORDER — ACETAMINOPHEN 500 MG
1000 TABLET ORAL ONCE
Refills: 0 | Status: COMPLETED | OUTPATIENT
Start: 2022-11-13 | End: 2022-11-13

## 2022-11-13 RX ORDER — HYDROMORPHONE HYDROCHLORIDE 2 MG/ML
0.5 INJECTION INTRAMUSCULAR; INTRAVENOUS; SUBCUTANEOUS ONCE
Refills: 0 | Status: DISCONTINUED | OUTPATIENT
Start: 2022-11-13 | End: 2022-11-13

## 2022-11-13 RX ORDER — PANTOPRAZOLE SODIUM 20 MG/1
40 TABLET, DELAYED RELEASE ORAL ONCE
Refills: 0 | Status: COMPLETED | OUTPATIENT
Start: 2022-11-13 | End: 2022-11-13

## 2022-11-13 RX ORDER — RADIOPAQUE PVC MARKERS/BARIUM 24MARKERS
900 CAPSULE ORAL ONCE
Refills: 0 | Status: COMPLETED | OUTPATIENT
Start: 2022-11-13 | End: 2022-11-13

## 2022-11-13 RX ORDER — SODIUM CHLORIDE 9 MG/ML
1000 INJECTION INTRAMUSCULAR; INTRAVENOUS; SUBCUTANEOUS ONCE
Refills: 0 | Status: COMPLETED | OUTPATIENT
Start: 2022-11-13 | End: 2022-11-13

## 2022-11-13 RX ORDER — ONDANSETRON 8 MG/1
4 TABLET, FILM COATED ORAL ONCE
Refills: 0 | Status: COMPLETED | OUTPATIENT
Start: 2022-11-13 | End: 2022-11-13

## 2022-11-13 RX ADMIN — SODIUM CHLORIDE 1000 MILLILITER(S): 9 INJECTION INTRAMUSCULAR; INTRAVENOUS; SUBCUTANEOUS at 19:03

## 2022-11-13 RX ADMIN — Medication 1000 MILLIGRAM(S): at 19:30

## 2022-11-13 RX ADMIN — Medication 400 MILLIGRAM(S): at 18:30

## 2022-11-13 RX ADMIN — Medication 900 MILLILITER(S): at 19:00

## 2022-11-13 RX ADMIN — HYDROMORPHONE HYDROCHLORIDE 1 MILLIGRAM(S): 2 INJECTION INTRAMUSCULAR; INTRAVENOUS; SUBCUTANEOUS at 19:03

## 2022-11-13 RX ADMIN — HYDROMORPHONE HYDROCHLORIDE 0.5 MILLIGRAM(S): 2 INJECTION INTRAMUSCULAR; INTRAVENOUS; SUBCUTANEOUS at 22:15

## 2022-11-13 RX ADMIN — PANTOPRAZOLE SODIUM 40 MILLIGRAM(S): 20 TABLET, DELAYED RELEASE ORAL at 17:21

## 2022-11-13 RX ADMIN — SODIUM CHLORIDE 1000 MILLILITER(S): 9 INJECTION INTRAMUSCULAR; INTRAVENOUS; SUBCUTANEOUS at 17:21

## 2022-11-13 RX ADMIN — HYDROMORPHONE HYDROCHLORIDE 0.5 MILLIGRAM(S): 2 INJECTION INTRAMUSCULAR; INTRAVENOUS; SUBCUTANEOUS at 21:49

## 2022-11-13 RX ADMIN — ONDANSETRON 4 MILLIGRAM(S): 8 TABLET, FILM COATED ORAL at 20:01

## 2022-11-13 RX ADMIN — HYDROMORPHONE HYDROCHLORIDE 1 MILLIGRAM(S): 2 INJECTION INTRAMUSCULAR; INTRAVENOUS; SUBCUTANEOUS at 20:15

## 2022-11-13 RX ADMIN — HYDROMORPHONE HYDROCHLORIDE 1 MILLIGRAM(S): 2 INJECTION INTRAMUSCULAR; INTRAVENOUS; SUBCUTANEOUS at 17:22

## 2022-11-13 RX ADMIN — HYDROMORPHONE HYDROCHLORIDE 1 MILLIGRAM(S): 2 INJECTION INTRAMUSCULAR; INTRAVENOUS; SUBCUTANEOUS at 20:01

## 2022-11-13 RX ADMIN — ONDANSETRON 4 MILLIGRAM(S): 8 TABLET, FILM COATED ORAL at 17:22

## 2022-11-13 NOTE — ED PROVIDER NOTE - CLINICAL SUMMARY MEDICAL DECISION MAKING FREE TEXT BOX
s/p lysis of adhesions 3 days ago with vomiting and abdominal pain since last night. r/o SBO. Plan: labs, CT scan and surgery evaluation.

## 2022-11-13 NOTE — ED ADULT NURSE NOTE - HOW OFTEN DO YOU HAVE A DRINK CONTAINING ALCOHOL?
MEDICARE WELLNESS VISIT NOTE      HISTORY OF PRESENT ILLNESS:   Irma Purvis presents for her First [facility] Visit. She has no current complaints or concerns.       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, N2, M0, 2 tumors 3.5 and 3.2 cm, ER positive IN positive HER-2/luis positive status post mastectomy 2004  Status post adjuvant chemotherapy with epirubicin and Cytoxan and Taxotere, radiation therapy, one year of Herceptin therapy, 5 years of tamoxifen therapy and 4 years of aromatase inhibitor therapy      â¢ Vitreous hemorrhage, left eye (CMS/[facility]) 2014     Priority: Low   â¢ retinal break s/p retinopexy left eye 2014     Priority: Low   â¢ History of breast cancer 2004     Priority: Low         Past Medical History:   Diagnosis Date   â¢ Malignant neoplasm (CMS/[facility])     Breast cancer          Past Surgical History:   Procedure Laterality Date   â¢ Colonoscopy  2005    repeat in 10 years   â¢ Dexa bone density axial skeleton  2014   â¢ Mastectomy modified radical  2004   â¢ Pap,thin prep w hpv(inc 17359)  2017    Normal; HPV negative   â¢ Thinprep pap test no hpv  2014    Negative         Social History     Tobacco Use   â¢ Smoking status: Former Smoker     Packs/day: 0.20     Years: 30.00     Pack years: 6.00     Types: Cigarettes     Last attempt to quit: 2004     Years since quittin.3   â¢ Smokeless tobacco: Never Used   Substance Use Topics   â¢ Alcohol use: Yes     Comment: 2 drinks per week    â¢ Drug use: No     Drug use:    Drug Use:    No Family History   Problem Relation Age of Onset   â¢ Cancer Father    â¢ Diabetes Mother    â¢ Myocardial Infarction Sister    â¢ Seizure Disorder Brother        Current Outpatient Medications   Medication Sig Dispense Refill   â¢ acetaminophen (TYLENOL) 500 MG tablet Take 1,000 mg by mouth every 6 hours as needed for Pain. â¢ triamcinolone (KENALOG) 0.025 % cream APPLY EXTERNALLY TO THE AFFECTED AREA THREE TIMES DAILY AS NEEDED 60 g 0   â¢ omeprazole (PRILOSEC) 20 MG capsule Take 20 mg by mouth daily as needed. â¢ Multiple Vitamins-Minerals (MULTIVITAMIN PO) Take 1 capsule by mouth daily. â¢ meclizine HCl (ANTIVERT) 25 MG tablet Take 1 tablet by mouth 3 times daily. 30 tablet 0     No current facility-administered medications for this visit. No answers on the Medicare Health Risk Assessment/questionnaire required further investigation. Reesa Gregorio model cannot be used d/t Hx of breast cancer. Vision and hearing screens: Hearing Screening Comments: Patient passes whisper test bilaterally. Advance Directive document: Yes  The patient has the following Advance Directive documents:  Power of  for Health Care  Cognitive Assessment: no evidence of cognitive dysfunction by direct observation     Recent PHQ 2/9 Score    PHQ 2:  Date PHQ 2 Score   12/10/2018 0       PHQ 9:  Date PHQ 9 Score   2/2/2017 0   [place]-SUICIDE SEVERITY RATING SCALE     In the Past Month   Yes \ No      1) Have you wished you were dead or wished you could go to sleep and not wake up? No      2) Have you actually had any thoughts about killing yourself? No       DEPRESSION ASSESSMENT/PLAN:  Depression screening is negative no further plan needed.         Needed Screening/Treatment:   Health Maintenance Due    Topic Date Due    â¢ Shingles Vaccine (1 of 2) 05/24/2001 Declines   â¢ Colorectal Cancer Screening-Colonoscopy  11/30/2015 Hector Chacko Medicare Wellness 65+  02/02/2018 Completed Today   â¢ Depression Screening  02/02/2018 Completed Today   â¢ Pneumococcal Vaccine 65+ Low/Medium Risk (2 of 2 - PPSV23) 02/02/2018 Declines     Unaddressed Risk Adjusted HCC Categories and Diagnoses  HCC 12 - Breast, Prostate, Other Cancers & Tumors   Unaddressed Dx: Malignant Neoplasm Of Breast (Cms/Hcc)   - Proliferative Diabetic Retinopathy and Vitreous Hemorrhage   Unaddressed Dx:Vitreous Hemorrhage, Left Eye (Cms/Hcc)      Needed follow up:  None    See orders. See Patient Instructions section. Return in about 1 year (around 12/10/2019) for Medicare Wellness Visit. Never

## 2022-11-13 NOTE — ED PROVIDER NOTE - OBJECTIVE STATEMENT
26 yo F with PMHx of asthma, anemia, obesity, chronic GERD, anxiety and depression, laparoscopy, gastric bypass, endoscopy, sleeve gastrectomy, augmentation of breasts, chronic constipation presents to ED c/o abd pain, nausea x today. Pt had a recent lysis of adhesions surgery. Pt was able to eat solid food before discharged on Wednesday (4 days ago) and has had mild pain, but pain worsened this morning at 2am. Pt has a post surgical appointment tomorrow, but came to ED due to severe pain. States pain is 10/10. Pt states she can't eat or drink. + BM, vomiting, dry heaving. Denies blood in stool. Took medication for pain, but no relief. Pt was in [place] before surgery.

## 2022-11-13 NOTE — ED ADULT NURSE NOTE - OBJECTIVE STATEMENT
26 y/o female received aox4 ambulatory c/o lower abd pain since this morning, 10/10 with  nausea and vomiting, pt noted dry heaving and belching. unable to retain solids/liquids. IVL inserted, bloods draw and sent to lab.

## 2022-11-13 NOTE — ED PROVIDER NOTE - GASTROINTESTINAL, MLM
healing surgical ports noted in abdomen, no infection or drainage, minor epigastri ctenderness, no rebound, no mass or HSM

## 2022-11-13 NOTE — ED PROVIDER NOTE - TEMPLATE, MLM
Patient states having history of interstitial cystitis. Patient sates having severe pelvic cramping, upper and lower back pain, chills without fever, bloating, headache, urinary pain, increased frequency and urgency in urination since this past Friday.   P
General

## 2022-11-14 DIAGNOSIS — Z98.890 OTHER SPECIFIED POSTPROCEDURAL STATES: Chronic | ICD-10-CM

## 2022-11-14 DIAGNOSIS — R10.9 UNSPECIFIED ABDOMINAL PAIN: ICD-10-CM

## 2022-11-14 PROCEDURE — 78227 HEPATOBIL SYST IMAGE W/DRUG: CPT | Mod: 26

## 2022-11-14 RX ORDER — HYDROMORPHONE HYDROCHLORIDE 2 MG/ML
0.5 INJECTION INTRAMUSCULAR; INTRAVENOUS; SUBCUTANEOUS ONCE
Refills: 0 | Status: DISCONTINUED | OUTPATIENT
Start: 2022-11-14 | End: 2022-11-14

## 2022-11-14 RX ORDER — PANTOPRAZOLE SODIUM 20 MG/1
40 TABLET, DELAYED RELEASE ORAL DAILY
Refills: 0 | Status: DISCONTINUED | OUTPATIENT
Start: 2022-11-14 | End: 2022-11-18

## 2022-11-14 RX ORDER — BUPROPION HYDROCHLORIDE 150 MG/1
300 TABLET, EXTENDED RELEASE ORAL DAILY
Refills: 0 | Status: DISCONTINUED | OUTPATIENT
Start: 2022-11-14 | End: 2022-11-18

## 2022-11-14 RX ORDER — OXYCODONE HYDROCHLORIDE 5 MG/1
5 TABLET ORAL EVERY 6 HOURS
Refills: 0 | Status: DISCONTINUED | OUTPATIENT
Start: 2022-11-14 | End: 2022-11-14

## 2022-11-14 RX ORDER — IBUPROFEN 200 MG
800 TABLET ORAL EVERY 6 HOURS
Refills: 0 | Status: DISCONTINUED | OUTPATIENT
Start: 2022-11-14 | End: 2022-11-16

## 2022-11-14 RX ORDER — SODIUM CHLORIDE 9 MG/ML
1000 INJECTION, SOLUTION INTRAVENOUS
Refills: 0 | Status: DISCONTINUED | OUTPATIENT
Start: 2022-11-14 | End: 2022-11-16

## 2022-11-14 RX ORDER — ACETAMINOPHEN 500 MG
1000 TABLET ORAL EVERY 6 HOURS
Refills: 0 | Status: DISCONTINUED | OUTPATIENT
Start: 2022-11-14 | End: 2022-11-14

## 2022-11-14 RX ORDER — ENOXAPARIN SODIUM 100 MG/ML
40 INJECTION SUBCUTANEOUS EVERY 24 HOURS
Refills: 0 | Status: DISCONTINUED | OUTPATIENT
Start: 2022-11-14 | End: 2022-11-18

## 2022-11-14 RX ORDER — ACETAMINOPHEN 500 MG
1000 TABLET ORAL EVERY 6 HOURS
Refills: 0 | Status: COMPLETED | OUTPATIENT
Start: 2022-11-14 | End: 2022-11-15

## 2022-11-14 RX ORDER — ONDANSETRON 8 MG/1
4 TABLET, FILM COATED ORAL ONCE
Refills: 0 | Status: COMPLETED | OUTPATIENT
Start: 2022-11-14 | End: 2022-11-14

## 2022-11-14 RX ORDER — ONDANSETRON 8 MG/1
4 TABLET, FILM COATED ORAL EVERY 6 HOURS
Refills: 0 | Status: DISCONTINUED | OUTPATIENT
Start: 2022-11-14 | End: 2022-11-18

## 2022-11-14 RX ORDER — HYDROMORPHONE HYDROCHLORIDE 2 MG/ML
0.5 INJECTION INTRAMUSCULAR; INTRAVENOUS; SUBCUTANEOUS EVERY 4 HOURS
Refills: 0 | Status: DISCONTINUED | OUTPATIENT
Start: 2022-11-14 | End: 2022-11-16

## 2022-11-14 RX ORDER — SODIUM CHLORIDE 9 MG/ML
1000 INJECTION, SOLUTION INTRAVENOUS
Refills: 0 | Status: DISCONTINUED | OUTPATIENT
Start: 2022-11-14 | End: 2022-11-14

## 2022-11-14 RX ORDER — SIMETHICONE 80 MG/1
80 TABLET, CHEWABLE ORAL EVERY 8 HOURS
Refills: 0 | Status: DISCONTINUED | OUTPATIENT
Start: 2022-11-14 | End: 2022-11-18

## 2022-11-14 RX ORDER — PANTOPRAZOLE SODIUM 20 MG/1
40 TABLET, DELAYED RELEASE ORAL ONCE
Refills: 0 | Status: COMPLETED | OUTPATIENT
Start: 2022-11-14 | End: 2022-11-14

## 2022-11-14 RX ORDER — ACETAMINOPHEN 500 MG
1000 TABLET ORAL ONCE
Refills: 0 | Status: COMPLETED | OUTPATIENT
Start: 2022-11-14 | End: 2022-11-14

## 2022-11-14 RX ORDER — SINCALIDE 5 UG/5ML
1.4 INJECTION, POWDER, LYOPHILIZED, FOR SOLUTION INTRAVENOUS ONCE
Refills: 0 | Status: COMPLETED | OUTPATIENT
Start: 2022-11-14 | End: 2022-11-14

## 2022-11-14 RX ADMIN — Medication 800 MILLIGRAM(S): at 18:30

## 2022-11-14 RX ADMIN — Medication 1000 MILLIGRAM(S): at 13:00

## 2022-11-14 RX ADMIN — Medication 400 MILLIGRAM(S): at 12:29

## 2022-11-14 RX ADMIN — HYDROMORPHONE HYDROCHLORIDE 0.5 MILLIGRAM(S): 2 INJECTION INTRAMUSCULAR; INTRAVENOUS; SUBCUTANEOUS at 08:42

## 2022-11-14 RX ADMIN — HYDROMORPHONE HYDROCHLORIDE 0.5 MILLIGRAM(S): 2 INJECTION INTRAMUSCULAR; INTRAVENOUS; SUBCUTANEOUS at 20:00

## 2022-11-14 RX ADMIN — HYDROMORPHONE HYDROCHLORIDE 0.5 MILLIGRAM(S): 2 INJECTION INTRAMUSCULAR; INTRAVENOUS; SUBCUTANEOUS at 03:29

## 2022-11-14 RX ADMIN — PANTOPRAZOLE SODIUM 40 MILLIGRAM(S): 20 TABLET, DELAYED RELEASE ORAL at 09:23

## 2022-11-14 RX ADMIN — HYDROMORPHONE HYDROCHLORIDE 0.5 MILLIGRAM(S): 2 INJECTION INTRAMUSCULAR; INTRAVENOUS; SUBCUTANEOUS at 22:36

## 2022-11-14 RX ADMIN — Medication 1000 MILLIGRAM(S): at 05:00

## 2022-11-14 RX ADMIN — HYDROMORPHONE HYDROCHLORIDE 0.5 MILLIGRAM(S): 2 INJECTION INTRAMUSCULAR; INTRAVENOUS; SUBCUTANEOUS at 03:50

## 2022-11-14 RX ADMIN — SODIUM CHLORIDE 125 MILLILITER(S): 9 INJECTION, SOLUTION INTRAVENOUS at 09:23

## 2022-11-14 RX ADMIN — ONDANSETRON 4 MILLIGRAM(S): 8 TABLET, FILM COATED ORAL at 18:38

## 2022-11-14 RX ADMIN — Medication 1000 MILLIGRAM(S): at 18:20

## 2022-11-14 RX ADMIN — Medication 400 MILLIGRAM(S): at 16:46

## 2022-11-14 RX ADMIN — Medication 400 MILLIGRAM(S): at 23:46

## 2022-11-14 RX ADMIN — HYDROMORPHONE HYDROCHLORIDE 0.5 MILLIGRAM(S): 2 INJECTION INTRAMUSCULAR; INTRAVENOUS; SUBCUTANEOUS at 20:27

## 2022-11-14 RX ADMIN — ONDANSETRON 4 MILLIGRAM(S): 8 TABLET, FILM COATED ORAL at 12:29

## 2022-11-14 RX ADMIN — HYDROMORPHONE HYDROCHLORIDE 0.5 MILLIGRAM(S): 2 INJECTION INTRAMUSCULAR; INTRAVENOUS; SUBCUTANEOUS at 00:08

## 2022-11-14 RX ADMIN — HYDROMORPHONE HYDROCHLORIDE 0.5 MILLIGRAM(S): 2 INJECTION INTRAMUSCULAR; INTRAVENOUS; SUBCUTANEOUS at 00:28

## 2022-11-14 RX ADMIN — HYDROMORPHONE HYDROCHLORIDE 0.5 MILLIGRAM(S): 2 INJECTION INTRAMUSCULAR; INTRAVENOUS; SUBCUTANEOUS at 13:50

## 2022-11-14 RX ADMIN — HYDROMORPHONE HYDROCHLORIDE 0.5 MILLIGRAM(S): 2 INJECTION INTRAMUSCULAR; INTRAVENOUS; SUBCUTANEOUS at 08:05

## 2022-11-14 RX ADMIN — HYDROMORPHONE HYDROCHLORIDE 0.5 MILLIGRAM(S): 2 INJECTION INTRAMUSCULAR; INTRAVENOUS; SUBCUTANEOUS at 12:59

## 2022-11-14 RX ADMIN — HYDROMORPHONE HYDROCHLORIDE 0.5 MILLIGRAM(S): 2 INJECTION INTRAMUSCULAR; INTRAVENOUS; SUBCUTANEOUS at 15:00

## 2022-11-14 RX ADMIN — Medication 400 MILLIGRAM(S): at 04:28

## 2022-11-14 RX ADMIN — HYDROMORPHONE HYDROCHLORIDE 0.5 MILLIGRAM(S): 2 INJECTION INTRAMUSCULAR; INTRAVENOUS; SUBCUTANEOUS at 18:45

## 2022-11-14 RX ADMIN — HYDROMORPHONE HYDROCHLORIDE 0.5 MILLIGRAM(S): 2 INJECTION INTRAMUSCULAR; INTRAVENOUS; SUBCUTANEOUS at 18:36

## 2022-11-14 RX ADMIN — Medication 400 MILLIGRAM(S): at 17:55

## 2022-11-14 RX ADMIN — SINCALIDE 51.4 MICROGRAM(S): 5 INJECTION, POWDER, LYOPHILIZED, FOR SOLUTION INTRAVENOUS at 11:10

## 2022-11-14 RX ADMIN — HYDROMORPHONE HYDROCHLORIDE 0.5 MILLIGRAM(S): 2 INJECTION INTRAMUSCULAR; INTRAVENOUS; SUBCUTANEOUS at 23:01

## 2022-11-14 RX ADMIN — Medication 1000 MILLIGRAM(S): at 13:50

## 2022-11-14 RX ADMIN — HYDROMORPHONE HYDROCHLORIDE 0.5 MILLIGRAM(S): 2 INJECTION INTRAMUSCULAR; INTRAVENOUS; SUBCUTANEOUS at 14:51

## 2022-11-14 NOTE — ED ADULT NURSE REASSESSMENT NOTE - NS ED NURSE REASSESS COMMENT FT1
Elise LOMBARDI at bedside pt states she feels like the pain is going to come back and would like to stay for pain control. Given apple juice for PO challenge. Ralph diet ordered.

## 2022-11-14 NOTE — PATIENT PROFILE ADULT - FALL HARM RISK - UNIVERSAL INTERVENTIONS
Bed in lowest position, wheels locked, appropriate side rails in place/Call bell, personal items and telephone in reach/Instruct patient to call for assistance before getting out of bed or chair/Non-slip footwear when patient is out of bed/[place] to call system/Physically safe environment - no spills, clutter or unnecessary equipment/Purposeful Proactive Rounding/Room/bathroom lighting operational, light cord in reach

## 2022-11-14 NOTE — H&P ADULT - ASSESSMENT
IMPRESSION:  Status post gastric bypass. No bowel obstruction or evidence of internal   hernia.    Assessment/Plan  Patient is a 27y old  Female who presents with a chief complaint of abdominal pain s/p recent ex lap and RAIZA.   Admission for further evaluation of pain and pain control  -no acute findings on CCK hida or Ct scan  -protonix  -dvt prophylaxis  -IVF  -beau regular  -am labs    Case and plan discussed with Dr. Mehta.    IMPRESSION:  Status post gastric bypass. No bowel obstruction or evidence of internal   hernia.    Assessment/Plan  Patient is a 27y old  Female who presents with a chief complaint of abdominal pain s/p recent ex lap and RAIZA.   Admission for further evaluation of pain and pain control  -no acute findings on CCK hida or Ct scan  -protonix  -dvt prophylaxis  -IVF  -beau regular  -am labs    Unable to order strattera and linzess. Pharmacy does not carry these meds. Pt aware she will have to bring them from home.   Case and plan discussed with Dr. Mehta.

## 2022-11-14 NOTE — H&P ADULT - NSHPLABSRESULTS_GEN_ALL_CORE
13.2   9.55  )-----------( 289      ( 13 Nov 2022 16:55 )             40.0   11-13    138  |  101  |  9   ----------------------------<  115<H>  3.6   |  24  |  0.92    Ca    9.3      13 Nov 2022 16:55    TPro  7.6  /  Alb  3.8  /  TBili  0.3  /  DBili  x   /  AST  31  /  ALT  35  /  AlkPhos  65  11-13    < from: NM Hepatobiliary Imaging w/ RX (11.14.22 @ 13:18) >    NTERPRETATION:  RADIOPHARMACEUTICAL: 3.5 mCi Tc-99m-Mebrofenin, I.V.    CLINICAL INFORMATION: 27 year old female with abdominal pain and history   of sleeve gastric bypass; referred to evaluate for biliary   dyskinesia/chronic acalculous cholecystitis.    TECHNIQUE:  An anterior static image of the abdomen was obtained   approximately 60 minutes after radiopharmaceutical injection, followed by   dynamic imaging of the anterior abdomen for 65 minutes.  Five minutes   after beginning dynamic imaging, sincalide 1.4 ug diluted in 50 cc normal   saline was infused intravenously over 60 minutes.    COMPARISON: No prior hepatobiliary scan is available for comparison.    FINDINGS:  The gall bladder is visualized on the 60 minutes post   injection image. The calculated gallbladder ejection fraction is   82%(normal: > 38%). There is normal clearance of hepatic activity at the   end of thestudy.    IMPRESSION: Gallbladder ejection fraction of 82%.    --- End of Report ---    < end of copied text >    < from: CT Abdomen and Pelvis w/ Oral Cont and w/ IV Cont (11.13.22 @ 19:59) >    NTERPRETATION:  CLINICAL INFORMATION: Abdominal pain. Recent lysis of   adhesions.    COMPARISON: CT abdomen pelvis 10/10/2022    CONTRAST/COMPLICATIONS:  IV Contrast: Omnipaque 350  90 cc administered   10 cc discarded  Oral Contrast: Smoothie Readi-Cat 2  Complications: None reported at time of study completion    PROCEDURE:  CT of the Abdomen and Pelvis was performed.  Sagittal andcoronal reformats were performed.    FINDINGS:  LOWER CHEST: Partially imaged breast implants. Small hiatal hernia.    LIVER: Within normal limits.  BILE DUCTS: Normal caliber.  GALLBLADDER: Within normal limits.  SPLEEN: Within normal limits.  PANCREAS: Within normal limits.  ADRENALS: Within normal limits.  KIDNEYS/URETERS: Subcentimeter stable hypodense right renal lesion,   statistically likely representing a cyst..    BLADDER: Within normal limits.  REPRODUCTIVE ORGANS: Routine is enhancement of uterus. Left adnexal cyst   measures 1.6 cm.    BOWEL: Status post gastric bypass. JJ anastomosis in the left upper   quadrant, similar to prior study. No bowel obstruction or evidence of   internal hernia. Normal appendix.  PERITONEUM: No ascites.  VESSELS: Within normal limits.  RETROPERITONEUM/LYMPH NODES: No lymphadenopathy.  ABDOMINAL WALL: New thickening of the umbilicus. New right ventral   abdominal wall skin thickening and subjacent infiltration at the level of   the umbilicus. Thesefindings likely relate to recent surgery.  BONES: Within normal limits.    IMPRESSION:  Status post gastric bypass. No bowel obstruction or evidence of internal   hernia.    < end of copied text >

## 2022-11-14 NOTE — H&P ADULT - NSICDXPASTSURGICALHX_GEN_ALL_CORE_FT
PAST SURGICAL HISTORY:  History of augmentation of both breasts 8/ 2014    History of laparoscopy 2021 for hiatal hernia repair, SBO and Lysis of adhesions    History of sleeve gastrectomy 5/ 2012    S/P endoscopy upper - 10/2019    S/P exploratory laparotomy 11/10/22    S/P gastric bypass 01/2020

## 2022-11-14 NOTE — ED ADULT NURSE REASSESSMENT NOTE - NS ED NURSE REASSESS COMMENT FT1
Received report from night rn, seen and evaluated bedside. Pt endorsing c/o diffuse abd pain, states 10/10, with nausea. MD aware. Pending surgeon evaluation. VSS.

## 2022-11-14 NOTE — ED ADULT NURSE REASSESSMENT NOTE - NS ED NURSE REASSESS COMMENT FT1
PT c/o of continued pain, Surg Pa called and messaged left, pt does not feel she can take PO tylenol, requesting IV. Pending order change. Pt states she will be able to tolerate HIDA scan before med.

## 2022-11-14 NOTE — H&P ADULT - HISTORY OF PRESENT ILLNESS
Patient is a 27y old  Female who presents with a chief complaint of abdominal pain s/p recent ex lap and RAIZA.     HPI: 28 yo F POD#4 of ex lap with RAIZA presented to ED c/o abd pain, nausea. Pshx of sleeve to gastric bypass in 2020. States abd pain changed from RUQ to midline from umbilicus to breast bone. Not associated with food. Today pain 5/10 with pain meds. Describes pain as pressure, stabbing, and a sensation that there is something in her chest.  No nausea or vomiting today. +flatus and BM.     PAST MEDICAL & SURGICAL HISTORY:  Anxiety and depression  Chronic GERD  History of obesity  Migraines  Constipation  History of anemia  History of asthma  as a child  History of augmentation of both breasts  8/ 2014  History of sleeve gastrectomy  5/ 2012  S/P endoscopy  upper - 10/2019  S/P gastric bypass  01/2020  History of laparoscopy  2021 for hiatal hernia repair, SBO and Lysis of adhesions

## 2022-11-14 NOTE — H&P ADULT - NS ATTEND AMEND GEN_ALL_CORE FT
I have personally seen and examined the patient.  I fully participated in the care of this patient.  I have made amendments to the documentation where necessary, and agree with the history, physical exam, impression/assessment, and plan as documented by the PA    Patient s/p recent laparoscopy and RAIZA, history of RYGB. Readmitted with abdominal pain likely post surgical  CT images reviewed no intra-abdominal pathology noted that can explain her symptoms  Labs WNL  CCK HIDA demonstrates normal EF, no evidence of biliary dyskinesia  -multimodal pain control  -vte ppx  -bariatric diet

## 2022-11-14 NOTE — H&P ADULT - NEGATIVE GASTROINTESTINAL SYMPTOMS
Pt received awake and alert. Denies chest pain. Reports that dyspnea has improved greatly. Able to ambulate in jara with only minor dyspnea. Cleared for discharge. Pt and wife aware and agreeable.  Discharge instructions, medications, side effects, and foll medications  - I/o  - See additional Care Plan goals for specific interventions  Outcome: Adequate for Discharge     Problem: CARDIOVASCULAR - ADULT  Goal: Maintains optimal cardiac output and hemodynamic stability  Description: INTERVENTIONS:  - Monitor v no nausea/no vomiting

## 2022-11-14 NOTE — H&P ADULT - GASTROINTESTINAL COMMENTS
incisions c/d/i. Derma oropeza present. Abd soft, non distended. Mild epigastric tenderness. No guarding.

## 2022-11-15 LAB
ANION GAP SERPL CALC-SCNC: 12 MMOL/L — SIGNIFICANT CHANGE UP (ref 5–17)
BUN SERPL-MCNC: 7 MG/DL — SIGNIFICANT CHANGE UP (ref 7–23)
CALCIUM SERPL-MCNC: 8.6 MG/DL — SIGNIFICANT CHANGE UP (ref 8.4–10.5)
CHLORIDE SERPL-SCNC: 100 MMOL/L — SIGNIFICANT CHANGE UP (ref 96–108)
CO2 SERPL-SCNC: 23 MMOL/L — SIGNIFICANT CHANGE UP (ref 22–31)
CREAT SERPL-MCNC: 0.73 MG/DL — SIGNIFICANT CHANGE UP (ref 0.5–1.3)
EGFR: 114 ML/MIN/1.73M2 — SIGNIFICANT CHANGE UP
GLUCOSE SERPL-MCNC: 96 MG/DL — SIGNIFICANT CHANGE UP (ref 70–99)
HCT VFR BLD CALC: 35.7 % — SIGNIFICANT CHANGE UP (ref 34.5–45)
HGB BLD-MCNC: 12 G/DL — SIGNIFICANT CHANGE UP (ref 11.5–15.5)
MCHC RBC-ENTMCNC: 27.9 PG — SIGNIFICANT CHANGE UP (ref 27–34)
MCHC RBC-ENTMCNC: 33.6 GM/DL — SIGNIFICANT CHANGE UP (ref 32–36)
MCV RBC AUTO: 83 FL — SIGNIFICANT CHANGE UP (ref 80–100)
NRBC # BLD: 0 /100 WBCS — SIGNIFICANT CHANGE UP (ref 0–0)
PLATELET # BLD AUTO: 250 K/UL — SIGNIFICANT CHANGE UP (ref 150–400)
POTASSIUM SERPL-MCNC: 3.4 MMOL/L — LOW (ref 3.5–5.3)
POTASSIUM SERPL-SCNC: 3.4 MMOL/L — LOW (ref 3.5–5.3)
RBC # BLD: 4.3 M/UL — SIGNIFICANT CHANGE UP (ref 3.8–5.2)
RBC # FLD: 15.1 % — HIGH (ref 10.3–14.5)
SODIUM SERPL-SCNC: 135 MMOL/L — SIGNIFICANT CHANGE UP (ref 135–145)
WBC # BLD: 8.47 K/UL — SIGNIFICANT CHANGE UP (ref 3.8–10.5)
WBC # FLD AUTO: 8.47 K/UL — SIGNIFICANT CHANGE UP (ref 3.8–10.5)

## 2022-11-15 RX ORDER — LACTULOSE 10 G/15ML
20 SOLUTION ORAL DAILY
Refills: 0 | Status: DISCONTINUED | OUTPATIENT
Start: 2022-11-15 | End: 2022-11-18

## 2022-11-15 RX ORDER — SENNA PLUS 8.6 MG/1
2 TABLET ORAL AT BEDTIME
Refills: 0 | Status: DISCONTINUED | OUTPATIENT
Start: 2022-11-15 | End: 2022-11-18

## 2022-11-15 RX ORDER — SCOPALAMINE 1 MG/3D
1 PATCH, EXTENDED RELEASE TRANSDERMAL
Refills: 0 | Status: DISCONTINUED | OUTPATIENT
Start: 2022-11-15 | End: 2022-11-17

## 2022-11-15 RX ORDER — METOCLOPRAMIDE HCL 10 MG
10 TABLET ORAL ONCE
Refills: 0 | Status: COMPLETED | OUTPATIENT
Start: 2022-11-15 | End: 2022-11-15

## 2022-11-15 RX ORDER — ACETAMINOPHEN 500 MG
1000 TABLET ORAL EVERY 6 HOURS
Refills: 0 | Status: COMPLETED | OUTPATIENT
Start: 2022-11-15 | End: 2022-11-16

## 2022-11-15 RX ORDER — KETOROLAC TROMETHAMINE 30 MG/ML
30 SYRINGE (ML) INJECTION ONCE
Refills: 0 | Status: DISCONTINUED | OUTPATIENT
Start: 2022-11-15 | End: 2022-11-15

## 2022-11-15 RX ORDER — HYDROMORPHONE HYDROCHLORIDE 2 MG/ML
0.5 INJECTION INTRAMUSCULAR; INTRAVENOUS; SUBCUTANEOUS ONCE
Refills: 0 | Status: DISCONTINUED | OUTPATIENT
Start: 2022-11-15 | End: 2022-11-15

## 2022-11-15 RX ORDER — HYOSCYAMINE SULFATE 0.13 MG
0.12 TABLET ORAL EVERY 6 HOURS
Refills: 0 | Status: DISCONTINUED | OUTPATIENT
Start: 2022-11-15 | End: 2022-11-17

## 2022-11-15 RX ORDER — POTASSIUM CHLORIDE 20 MEQ
10 PACKET (EA) ORAL
Refills: 0 | Status: COMPLETED | OUTPATIENT
Start: 2022-11-15 | End: 2022-11-15

## 2022-11-15 RX ORDER — POLYETHYLENE GLYCOL 3350 17 G/17G
17 POWDER, FOR SOLUTION ORAL ONCE
Refills: 0 | Status: DISCONTINUED | OUTPATIENT
Start: 2022-11-15 | End: 2022-11-15

## 2022-11-15 RX ORDER — SUCRALFATE 1 G
1 TABLET ORAL
Refills: 0 | Status: DISCONTINUED | OUTPATIENT
Start: 2022-11-15 | End: 2022-11-18

## 2022-11-15 RX ADMIN — HYDROMORPHONE HYDROCHLORIDE 0.5 MILLIGRAM(S): 2 INJECTION INTRAMUSCULAR; INTRAVENOUS; SUBCUTANEOUS at 07:42

## 2022-11-15 RX ADMIN — ONDANSETRON 4 MILLIGRAM(S): 8 TABLET, FILM COATED ORAL at 02:52

## 2022-11-15 RX ADMIN — HYDROMORPHONE HYDROCHLORIDE 0.5 MILLIGRAM(S): 2 INJECTION INTRAMUSCULAR; INTRAVENOUS; SUBCUTANEOUS at 21:25

## 2022-11-15 RX ADMIN — Medication 1000 MILLIGRAM(S): at 06:15

## 2022-11-15 RX ADMIN — HYDROMORPHONE HYDROCHLORIDE 0.5 MILLIGRAM(S): 2 INJECTION INTRAMUSCULAR; INTRAVENOUS; SUBCUTANEOUS at 15:50

## 2022-11-15 RX ADMIN — Medication 400 MILLIGRAM(S): at 23:25

## 2022-11-15 RX ADMIN — HYDROMORPHONE HYDROCHLORIDE 0.5 MILLIGRAM(S): 2 INJECTION INTRAMUSCULAR; INTRAVENOUS; SUBCUTANEOUS at 05:47

## 2022-11-15 RX ADMIN — Medication 1000 MILLIGRAM(S): at 00:10

## 2022-11-15 RX ADMIN — SENNA PLUS 2 TABLET(S): 8.6 TABLET ORAL at 21:25

## 2022-11-15 RX ADMIN — Medication 1000 MILLIGRAM(S): at 23:25

## 2022-11-15 RX ADMIN — SCOPALAMINE 1 PATCH: 1 PATCH, EXTENDED RELEASE TRANSDERMAL at 19:41

## 2022-11-15 RX ADMIN — HYDROMORPHONE HYDROCHLORIDE 0.5 MILLIGRAM(S): 2 INJECTION INTRAMUSCULAR; INTRAVENOUS; SUBCUTANEOUS at 18:28

## 2022-11-15 RX ADMIN — HYDROMORPHONE HYDROCHLORIDE 0.5 MILLIGRAM(S): 2 INJECTION INTRAMUSCULAR; INTRAVENOUS; SUBCUTANEOUS at 13:05

## 2022-11-15 RX ADMIN — HYDROMORPHONE HYDROCHLORIDE 0.5 MILLIGRAM(S): 2 INJECTION INTRAMUSCULAR; INTRAVENOUS; SUBCUTANEOUS at 08:00

## 2022-11-15 RX ADMIN — ONDANSETRON 4 MILLIGRAM(S): 8 TABLET, FILM COATED ORAL at 07:56

## 2022-11-15 RX ADMIN — Medication 0.12 MILLIGRAM(S): at 18:36

## 2022-11-15 RX ADMIN — Medication 400 MILLIGRAM(S): at 05:48

## 2022-11-15 RX ADMIN — HYDROMORPHONE HYDROCHLORIDE 0.5 MILLIGRAM(S): 2 INJECTION INTRAMUSCULAR; INTRAVENOUS; SUBCUTANEOUS at 11:45

## 2022-11-15 RX ADMIN — Medication 10 MILLIGRAM(S): at 05:48

## 2022-11-15 RX ADMIN — HYDROMORPHONE HYDROCHLORIDE 0.5 MILLIGRAM(S): 2 INJECTION INTRAMUSCULAR; INTRAVENOUS; SUBCUTANEOUS at 15:35

## 2022-11-15 RX ADMIN — HYDROMORPHONE HYDROCHLORIDE 0.5 MILLIGRAM(S): 2 INJECTION INTRAMUSCULAR; INTRAVENOUS; SUBCUTANEOUS at 03:00

## 2022-11-15 RX ADMIN — Medication 100 MILLIEQUIVALENT(S): at 11:29

## 2022-11-15 RX ADMIN — HYDROMORPHONE HYDROCHLORIDE 0.5 MILLIGRAM(S): 2 INJECTION INTRAMUSCULAR; INTRAVENOUS; SUBCUTANEOUS at 11:27

## 2022-11-15 RX ADMIN — Medication 10 MILLIGRAM(S): at 14:26

## 2022-11-15 RX ADMIN — HYDROMORPHONE HYDROCHLORIDE 0.5 MILLIGRAM(S): 2 INJECTION INTRAMUSCULAR; INTRAVENOUS; SUBCUTANEOUS at 13:20

## 2022-11-15 RX ADMIN — HYDROMORPHONE HYDROCHLORIDE 0.5 MILLIGRAM(S): 2 INJECTION INTRAMUSCULAR; INTRAVENOUS; SUBCUTANEOUS at 02:45

## 2022-11-15 RX ADMIN — SODIUM CHLORIDE 75 MILLILITER(S): 9 INJECTION, SOLUTION INTRAVENOUS at 21:25

## 2022-11-15 RX ADMIN — Medication 1000 MILLIGRAM(S): at 18:41

## 2022-11-15 RX ADMIN — SCOPALAMINE 1 PATCH: 1 PATCH, EXTENDED RELEASE TRANSDERMAL at 15:15

## 2022-11-15 RX ADMIN — PANTOPRAZOLE SODIUM 40 MILLIGRAM(S): 20 TABLET, DELAYED RELEASE ORAL at 11:27

## 2022-11-15 RX ADMIN — Medication 400 MILLIGRAM(S): at 17:13

## 2022-11-15 RX ADMIN — HYDROMORPHONE HYDROCHLORIDE 0.5 MILLIGRAM(S): 2 INJECTION INTRAMUSCULAR; INTRAVENOUS; SUBCUTANEOUS at 06:15

## 2022-11-15 RX ADMIN — Medication 1 GRAM(S): at 17:22

## 2022-11-15 RX ADMIN — Medication 30 MILLIGRAM(S): at 23:25

## 2022-11-15 RX ADMIN — HYDROMORPHONE HYDROCHLORIDE 0.5 MILLIGRAM(S): 2 INJECTION INTRAMUSCULAR; INTRAVENOUS; SUBCUTANEOUS at 18:48

## 2022-11-15 RX ADMIN — Medication 30 MILLIGRAM(S): at 23:55

## 2022-11-15 NOTE — DIETITIAN INITIAL EVALUATION ADULT - REASON FOR ADMISSION
Per H&P " 26 yo F POD#4 of ex lap with RAIZA presented to ED c/o abd pain, nausea. Pshx of sleeve to gastric bypass in 2020. States abd pain changed from RUQ to midline from umbilicus to breast bone. Not associated with food. Today pain 5/10 with pain meds. Describes pain as pressure, stabbing, and a sensation that there is something in her chest.  No nausea or vomiting today. +flatus and BM."

## 2022-11-15 NOTE — PROGRESS NOTE ADULT - TIME BILLING
Agree with above.  Patient seen and examined.  Still complaining of nausea and abdominal pain.  Tolerating small amount of food.  Required dilaudid, did ambulate.  Refused lovenox. CT scan negative, HIDA with CCK normal ejection fraction.  PE abdomen soft nontender.  Encourage PO as tolerated, Ambulate.  GI evaluation.  Stool softner.   Stoool for H. Pylori.   Discussed plan with patient in detail.

## 2022-11-15 NOTE — CONSULT NOTE ADULT - ASSESSMENT
gerd  absd pain  s/p lesion of adhesions  constipation    plan  start bowel regimen  add lactulose 10cc q 6 hours and glycerin suppository  gas pill with simethicone and maalox q 6 hours  monitor stool output  gerd precautions w/PO intake  ppi once a day, may increase to twice a day   maalox as needed   will monitor clinically and if no improvement may need EGD if worse  add carafate 1 gr po q 12 hours  diet as tolerated      d/w dr curran  Advanced care planning was discussed with patient and family.  Advanced care planning forms were reviewed and discussed.  Risks, benefits and alternatives of gastroenterologic procedures were discussed in detail and all questions were answered.    30 minutes spent.

## 2022-11-15 NOTE — DIETITIAN INITIAL EVALUATION ADULT - ORAL INTAKE PTA/DIET HISTORY
Visited pt in room, feeling of nausea, in pain, unable to do interview. Per chart review, pt was admitted last week and seen by RD. Per previous RD note on 11/11 "Pt admitted with intermittent abd pain x 5 month, worse with po intake/meal consumption.  Pt s/p ex lap with lysis of adhesions (noted pt with hx HH, SBO, RAIZA.  Bariatric clear liquid diet initiated and pt tolerated - diet progressed to phase 3 bariatric regular diet.  Pt reports she was able to consume 1/2 sandwich, pudding, broth from soup.  PTA pt endorses consuming ~4x smaller meals per day; states certain foods she was unable to tolerate (i.e red meat, spicy food (also hx gerd), limiting salad/uncooked vegetables).  Reports consuming cottage cheese, eggs, sometimes soy products, beans with overall good tolerance."

## 2022-11-15 NOTE — CONSULT NOTE ADULT - SUBJECTIVE AND OBJECTIVE BOX
Chief Complaint:  Patient is a 27y old  Female who presents with a chief complaint of Per H&P " 28 yo F POD#4 of ex lap with RAIZA presented to ED c/o abd pain, nausea. Pshx of sleeve to gastric bypass in 2020. States abd pain changed from RUQ to midline from umbilicus to breast bone. Not associated with food. Today pain 5/10 with pain meds. Describes pain as pressure, stabbing, and a sensation that there is something in her chest.  No nausea or vomiting today. +flatus and BM." s/p RAIZA now with abd pain history of chronic constipation hida is neg ct scan neg also  Patient is a 27y old  Female who presents with a chief complaint of abdominal pain s/p recent ex lap and RAIZA.     HPI: 28 yo F POD#4 of ex lap with RAIZA presented to ED c/o abd pain, nausea. Pshx of sleeve to gastric bypass in . States abd pain changed from RUQ to midline from umbilicus to breast bone. Not associated with food. Today pain 5/10 with pain meds. Describes pain as pressure, stabbing, and a sensation that there is something in her chest.  No nausea or vomiting today. +flatus and BM.     PAST MEDICAL & SURGICAL HISTORY:  Anxiety and depression  Chronic GERD  History of obesity  Migraines  Constipation  History of anemia  History of asthma  as a child  History of augmentation of both breasts  2014  History of sleeve gastrectomy  2012  S/P endoscopy  upper - 10/2019  S/P gastric bypass  2020  History of laparoscopy   for hiatal hernia repair, SBO and Lysis of adhesions       Review of Systems:  · General	negative  · Skin/Breast	negative  · Ophthalmologic	negative  · ENMT	negative  · Respiratory and Thorax	negative  · Cardiovascular	negative  · Negative Gastrointestinal Symptoms	no nausea; no vomiting  · Gastrointestinal Symptoms	constipation  · Genitourinary	negative  · Musculoskeletal	negative  · Neurological	negative  · Psychiatric	negative  · Hematology/Lymphatics	negative  · Endocrine	negative  · Allergic/Immunologic	negative      Allergies:  penicillins (Unknown)      Medications:  acetaminophen   IVPB .. 1000 milliGRAM(s) IV Intermittent every 6 hours  buPROPion XL (24-Hour) . 300 milliGRAM(s) Oral daily  enoxaparin Injectable 40 milliGRAM(s) SubCutaneous every 24 hours  HYDROmorphone  Injectable 0.5 milliGRAM(s) IV Push every 4 hours PRN  hyoscyamine SL 0.125 milliGRAM(s) SubLingual every 6 hours PRN  ibuprofen IVPB .. 800 milliGRAM(s) IV Intermittent every 6 hours PRN  lactated ringers. 1000 milliLiter(s) IV Continuous <Continuous>  ondansetron Injectable 4 milliGRAM(s) IV Push every 6 hours PRN  pantoprazole  Injectable 40 milliGRAM(s) IV Push daily  scopolamine 1 mG/72 Hr(s) Patch 1 Patch Transdermal every 72 hours  senna 2 Tablet(s) Oral at bedtime  simethicone 80 milliGRAM(s) Chew every 8 hours PRN      PMHX/PSHX:  Anxiety    Anxiety and depression    Chronic GERD    History of obesity    Migraines    Constipation    History of anemia    History of asthma    History of augmentation of both breasts    History of sleeve gastrectomy    S/P endoscopy    S/P gastric bypass    History of laparoscopy    S/P exploratory laparotomy        Family history:  Family history of anxiety disorder (Mother)    Family history of depression (Mother)    Family history of hyperlipidemia (Father)        Social History:   lives at home no etoh no cigs no ivda    ROS:     General:  No wt loss, fevers, chills, night sweats, fatigue,   Eyes:  Good vision, no reported pain  ENT:  No sore throat, pain, runny nose, dysphagia  CV:  No pain, palpitations, hypo/hypertension  Resp:  No dyspnea, cough, tachypnea, wheezing  GI:  No pain, No nausea, No vomiting, No diarrhea, No constipation, No weight loss, No fever, No pruritis, No rectal bleeding, No tarry stools, No dysphagia,  :  No pain, bleeding, incontinence, nocturia  Muscle:  No pain, weakness  Neuro:  No weakness, tingling, memory problems  Psych:  No fatigue, insomnia, mood problems, depression  Endocrine:  No polyuria, polydipsia, cold/heat intolerance  Heme:  No petechiae, ecchymosis, easy bruisability  Skin:  No rash, tattoos, scars, edema      PHYSICAL EXAM:   Vital Signs:  Vital Signs Last 24 Hrs  T(C): 36.7 (15 Nov 2022 13:57), Max: 36.8 (15 Nov 2022 01:27)  T(F): 98.1 (15 Nov 2022 13:57), Max: 98.2 (15 Nov 2022 01:27)  HR: 57 (15 Nov 2022 13:57) (50 - 75)  BP: 156/87 (15 Nov 2022 13:57) (107/70 - 156/87)  BP(mean): --  RR: 18 (15 Nov 2022 13:57) (18 - 18)  SpO2: 99% (15 Nov 2022 13:57) (99% - 100%)    Parameters below as of 15 Nov 2022 05:36  Patient On (Oxygen Delivery Method): room air      Daily     Daily     GENERAL:  Appears stated age, well-groomed, well-nourished, no distress  HEENT:  NC/AT,  conjunctivae clear and pink, no thyromegaly, nodules, adenopathy, no JVD, sclera -anicteric  CHEST:  Full & symmetric excursion, no increased effort, breath sounds clear  HEART:  Regular rhythm, S1, S2, no murmur/rub/S3/S4, no abdominal bruit, no edema  ABDOMEN:  Soft, non-tender, non-distended, normoactive bowel sounds,  no masses ,no hepato-splenomegaly, no signs of chronic liver disease  EXTEREMITIES:  no cyanosis,clubbing or edema  SKIN:  No rash/erythema/ecchymoses/petechiae/wounds/abscess/warm/dry  NEURO:  Alert, oriented, no asterixis, no tremor, no encephalopathy    LABS:                        12.0   8.47  )-----------( 250      ( 15 Nov 2022 07:52 )             35.7     11-15    135  |  100  |  7   ----------------------------<  96  3.4<L>   |  23  |  0.73    Ca    8.6      15 Nov 2022 07:52    TPro  7.6  /  Alb  3.8  /  TBili  0.3  /  DBili  x   /  AST  31  /  ALT  35  /  AlkPhos  65  11-13    LIVER FUNCTIONS - ( 2022 16:55 )  Alb: 3.8 g/dL / Pro: 7.6 g/dL / ALK PHOS: 65 U/L / ALT: 35 U/L DA / AST: 31 U/L / GGT: x           PT/INR - ( 2022 17:40 )   PT: 12.1 sec;   INR: 1.05 ratio         PTT - ( 2022 17:40 )  PTT:27.4 sec  Urinalysis Basic - ( 2022 18:10 )    Color: Yellow / Appearance: Clear / S.010 / pH: x  Gluc: x / Ketone: Moderate  / Bili: Negative / Urobili: Negative mg/dL   Blood: x / Protein: Negative mg/dL / Nitrite: Negative   Leuk Esterase: Negative / RBC: x / WBC x   Sq Epi: x / Non Sq Epi: x / Bacteria: x          Imaging:

## 2022-11-15 NOTE — DIETITIAN INITIAL EVALUATION ADULT - PERTINENT LABORATORY DATA
11-15    135  |  100  |  7   ----------------------------<  96  3.4<L>   |  23  |  0.73    Ca    8.6      15 Nov 2022 07:52    TPro  7.6  /  Alb  3.8  /  TBili  0.3  /  DBili  x   /  AST  31  /  ALT  35  /  AlkPhos  65  11-13

## 2022-11-15 NOTE — DIETITIAN INITIAL EVALUATION ADULT - NS FNS DIET ORDER
Diet, Regular:   Phase 3 Bariatric Regular ([id number])  Supplement Feeding Modality:  Oral  Ensure Clear Cans or Servings Per Day:  1       Frequency:  Daily (11-15-22 @ 15:13)

## 2022-11-15 NOTE — PROGRESS NOTE ADULT - SUBJECTIVE AND OBJECTIVE BOX
Patient is a 27y old  Female who presents with a chief complaint of abdominal pain s/p recent ex lap and RAIZA.     HPI: 28 yo F POD#5 of ex lap with RAIZA presented to ED c/o abd pain, nausea. Pshx of sleeve to gastric bypass in . States stabbing/pressure pain remains at  midline from umbilicus to breast bone. Last night she received an extra dose of dilaudid. Tylenol does not provide relief. Attempted to have a smoothie yesterday but became nauseous, no vomiting but was dry heaving. Received zofran and levsin for nausea. +flatus. Last BM was yesterday.        PAST MEDICAL & SURGICAL HISTORY:  Anxiety and depression  Chronic GERD  History of obesity  Migraines  Constipation  History of anemia  History of asthma  as a child  History of augmentation of both breasts  2014  History of sleeve gastrectomy  2012  S/P endoscopy  upper - 10/2019  S/P gastric bypass  2020  History of laparoscopy   for hiatal hernia repair, SBO and Lysis of adhesions    Review of Systems:  see above    MEDICATIONS  (STANDING):  MEDICATIONS  (STANDING):  buPROPion XL (24-Hour) . 300 milliGRAM(s) Oral daily  enoxaparin Injectable 40 milliGRAM(s) SubCutaneous every 24 hours  lactated ringers. 1000 milliLiter(s) (75 mL/Hr) IV Continuous <Continuous>  pantoprazole  Injectable 40 milliGRAM(s) IV Push daily      Allergies  penicillins (Unknown)      Vital Signs Last 24 Hrs  Vital Signs Last 24 Hrs  T(C): 36.7 (15 Nov 2022 09:30), Max: 36.8 (15 Nov 2022 01:27)  T(F): 98 (15 Nov 2022 09:30), Max: 98.2 (15 Nov 2022 01:27)  HR: 61 (15 Nov 2022 09:30) (50 - 75)  BP: 107/70 (15 Nov 2022 09:30) (107/70 - 166/78)  BP(mean): --  RR: 18 (15 Nov 2022 09:30) (18 - 18)  SpO2: 100% (15 Nov 2022 09:30) (99% - 100%)    Parameters below as of 15 Nov 2022 05:36  Patient On (Oxygen Delivery Method): room air    Physical Exam:  General:  Appears stated age, well-groomed, well-nourished  Eyes : CARI  HENT:  WNL, no JVD  Chest: equal chest expansion  Cardiovascular:  pulse regular  Abdomen: incisions c/d/i. Derma oropeza present. Abd soft, non distended. +bowel sounds x 4. Mild epigastric tenderness. No guarding.   Extremities:  no swelling. Symmetrical  Neuro/Psych:  Alert, oriented tp time, place and person       LABS:                                    12.0   8.47  )-----------( 250      ( 15 Nov 2022 07:52 )             35.7   11-15    135  |  100  |  7   ----------------------------<  96  3.4<L>   |  23  |  0.73    Ca    8.6      15 Nov 2022 07:52    TPro  7.6  /  Alb  3.8  /  TBili  0.3  /  DBili  x   /  AST  31  /  ALT  35  /  AlkPhos  65        Color: Yellow / Appearance: Clear / S.010 / pH: x  Gluc: x / Ketone: Moderate  / Bili: Negative / Urobili: Negative mg/dL   Blood: x / Protein: Negative mg/dL / Nitrite: Negative   Leuk Esterase: Negative / RBC: x / WBC x   Sq Epi: x / Non Sq Epi: x / Bacteria: x    RADIOLOGY & ADDITIONAL STUDIES:  < from: CT Abdomen and Pelvis w/ Oral Cont and w/ IV Cont (22 @ 19:59) >                      PROCEDURE DATE:  2022    INTERPRETATION:  CLINICAL INFORMATION: Abdominal pain. Recent lysis of   adhesions.  COMPARISON: CT abdomen pelvis 10/10/2022  CONTRAST/COMPLICATIONS:  IV Contrast: Omnipaque 350  90 cc administered   10 cc discarded  Oral Contrast: Smoothie Readi-Cat 2  Complications: None reported at time of study completion  PROCEDURE:  CT of the Abdomen and Pelvis was performed.  Sagittal andcoronal reformats were performed.  FINDINGS:  LOWER CHEST: Partially imaged breast implants. Small hiatal hernia.  LIVER: Within normal limits.  BILE DUCTS: Normal caliber.  GALLBLADDER: Within normal limits.  SPLEEN: Within normal limits.  PANCREAS: Within normal limits.  ADRENALS: Within normal limits.  KIDNEYS/URETERS: Subcentimeter stable hypodense right renal lesion,   statistically likely representing a cyst..    BLADDER: Within normal limits.  REPRODUCTIVE ORGANS: Routine is enhancement of uterus. Left adnexal cyst   measures 1.6 cm.    BOWEL: Status post gastric bypass. JJ anastomosis in the left upper   quadrant, similar to prior study. No bowel obstruction or evidence of   internal hernia. Normal appendix.  PERITONEUM: No ascites.  VESSELS: Within normal limits.  RETROPERITONEUM/LYMPH NODES: No lymphadenopathy.  ABDOMINAL WALL: New thickening of the umbilicus. New right ventral   abdominal wall skin thickening and subjacent infiltration at the level of   the umbilicus. These findings likely relate to recent surgery.  BONES: Within normal limits.    IMPRESSION:  Status post gastric bypass. No bowel obstruction or evidence of internal   hernia.    .< from: NM Hepatobiliary Imaging w/ RX (22 @ 13:18) >  PROCEDURE DATE:  2022          INTERPRETATION:  RADIOPHARMACEUTICAL: 3.5 mCi Tc-99m-Mebrofenin, I.V.    CLINICAL INFORMATION: 27 year old female with abdominal pain and history   of sleeve gastric bypass; referred to evaluate for biliary   dyskinesia/chronic acalculous cholecystitis.    TECHNIQUE:  An anterior static image of the abdomen was obtained   approximately 60 minutes after radiopharmaceutical injection, followed by   dynamic imaging of the anterior abdomen for 65 minutes.  Five minutes   after beginning dynamic imaging, sincalide 1.4 ug diluted in 50 cc normal   saline was infused intravenously over 60 minutes.    COMPARISON: No prior hepatobiliary scan is available for comparison.    FINDINGS:  The gall bladder is visualized on the 60 minutes post   injection image. The calculated gallbladder ejection fraction is   82%(normal: > 38%). There is normal clearance of hepatic activity at the   end of thestudy.    IMPRESSION: Gallbladder ejection fraction of 82%.    --- End of Report ---    < end of copied text >      Assessment/Plan  Patient s/p recent laparoscopy and RAIZA, history of RYGB. Readmitted with abdominal pain likely post surgical  -CT images reviewed no intra-abdominal pathology noted that can explain her symptoms  -[facility] demonstrates normal EF, no evidence of biliary dyskinesia  -multimodal pain control  -vte ppx  -protonix  -zofran and levsin for nausea  -IVF  -bariatric diet.   Patient is a 27y old  Female who presents with a chief complaint of abdominal pain s/p recent ex lap and RAIZA.     HPI: 26 yo F POD#5 of ex lap with RAIZA presented to ED c/o abd pain, nausea. Pshx of sleeve to gastric bypass in . States stabbing/pressure pain remains at  midline from umbilicus to breast bone. Last night she received an extra dose of dilaudid. Tylenol does not provide relief. Attempted to have a smoothie yesterday but became nauseous, no vomiting but was dry heaving. Received zofran and reglan for nausea. +flatus. Last BM was yesterday.        PAST MEDICAL & SURGICAL HISTORY:  Anxiety and depression  Chronic GERD  History of obesity  Migraines  Constipation  History of anemia  History of asthma  as a child  History of augmentation of both breasts  2014  History of sleeve gastrectomy  2012  S/P endoscopy  upper - 10/2019  S/P gastric bypass  2020  History of laparoscopy   for hiatal hernia repair, SBO and Lysis of adhesions    Review of Systems:  see above    MEDICATIONS  (STANDING):  MEDICATIONS  (STANDING):  buPROPion XL (24-Hour) . 300 milliGRAM(s) Oral daily  enoxaparin Injectable 40 milliGRAM(s) SubCutaneous every 24 hours  lactated ringers. 1000 milliLiter(s) (75 mL/Hr) IV Continuous <Continuous>  pantoprazole  Injectable 40 milliGRAM(s) IV Push daily      Allergies  penicillins (Unknown)      Vital Signs Last 24 Hrs  Vital Signs Last 24 Hrs  T(C): 36.7 (15 Nov 2022 09:30), Max: 36.8 (15 Nov 2022 01:27)  T(F): 98 (15 Nov 2022 09:30), Max: 98.2 (15 Nov 2022 01:27)  HR: 61 (15 Nov 2022 09:30) (50 - 75)  BP: 107/70 (15 Nov 2022 09:30) (107/70 - 166/78)  BP(mean): --  RR: 18 (15 Nov 2022 09:30) (18 - 18)  SpO2: 100% (15 Nov 2022 09:30) (99% - 100%)    Parameters below as of 15 Nov 2022 05:36  Patient On (Oxygen Delivery Method): room air    Physical Exam:  General:  Appears stated age, well-groomed, well-nourished  Eyes : CARI  HENT:  WNL, no JVD  Chest: equal chest expansion  Cardiovascular:  pulse regular  Abdomen: incisions c/d/i. Derma oropeza present. Abd soft, non distended. +bowel sounds x 4. Mild epigastric tenderness. No guarding.   Extremities:  no swelling. Symmetrical  Neuro/Psych:  Alert, oriented tp time, place and person       LABS:                                    12.0   8.47  )-----------( 250      ( 15 Nov 2022 07:52 )             35.7   11-15    135  |  100  |  7   ----------------------------<  96  3.4<L>   |  23  |  0.73    Ca    8.6      15 Nov 2022 07:52    TPro  7.6  /  Alb  3.8  /  TBili  0.3  /  DBili  x   /  AST  31  /  ALT  35  /  AlkPhos  65        Color: Yellow / Appearance: Clear / S.010 / pH: x  Gluc: x / Ketone: Moderate  / Bili: Negative / Urobili: Negative mg/dL   Blood: x / Protein: Negative mg/dL / Nitrite: Negative   Leuk Esterase: Negative / RBC: x / WBC x   Sq Epi: x / Non Sq Epi: x / Bacteria: x    RADIOLOGY & ADDITIONAL STUDIES:  < from: CT Abdomen and Pelvis w/ Oral Cont and w/ IV Cont (22 @ 19:59) >                      PROCEDURE DATE:  2022    INTERPRETATION:  CLINICAL INFORMATION: Abdominal pain. Recent lysis of   adhesions.  COMPARISON: CT abdomen pelvis 10/10/2022  CONTRAST/COMPLICATIONS:  IV Contrast: Omnipaque 350  90 cc administered   10 cc discarded  Oral Contrast: Smoothie Readi-Cat 2  Complications: None reported at time of study completion  PROCEDURE:  CT of the Abdomen and Pelvis was performed.  Sagittal andcoronal reformats were performed.  FINDINGS:  LOWER CHEST: Partially imaged breast implants. Small hiatal hernia.  LIVER: Within normal limits.  BILE DUCTS: Normal caliber.  GALLBLADDER: Within normal limits.  SPLEEN: Within normal limits.  PANCREAS: Within normal limits.  ADRENALS: Within normal limits.  KIDNEYS/URETERS: Subcentimeter stable hypodense right renal lesion,   statistically likely representing a cyst..    BLADDER: Within normal limits.  REPRODUCTIVE ORGANS: Routine is enhancement of uterus. Left adnexal cyst   measures 1.6 cm.    BOWEL: Status post gastric bypass. JJ anastomosis in the left upper   quadrant, similar to prior study. No bowel obstruction or evidence of   internal hernia. Normal appendix.  PERITONEUM: No ascites.  VESSELS: Within normal limits.  RETROPERITONEUM/LYMPH NODES: No lymphadenopathy.  ABDOMINAL WALL: New thickening of the umbilicus. New right ventral   abdominal wall skin thickening and subjacent infiltration at the level of   the umbilicus. These findings likely relate to recent surgery.  BONES: Within normal limits.    IMPRESSION:  Status post gastric bypass. No bowel obstruction or evidence of internal   hernia.    .< from: [facility] Hepatobiliary Imaging w/ RX (22 @ 13:18) >  PROCEDURE DATE:  2022          INTERPRETATION:  RADIOPHARMACEUTICAL: 3.5 mCi Tc-99m-Mebrofenin, I.V.    CLINICAL INFORMATION: 27 year old female with abdominal pain and history   of sleeve gastric bypass; referred to evaluate for biliary   dyskinesia/chronic acalculous cholecystitis.    TECHNIQUE:  An anterior static image of the abdomen was obtained   approximately 60 minutes after radiopharmaceutical injection, followed by   dynamic imaging of the anterior abdomen for 65 minutes.  Five minutes   after beginning dynamic imaging, sincalide 1.4 ug diluted in 50 cc normal   saline was infused intravenously over 60 minutes.    COMPARISON: No prior hepatobiliary scan is available for comparison.    FINDINGS:  The gall bladder is visualized on the 60 minutes post   injection image. The calculated gallbladder ejection fraction is   82%(normal: > 38%). There is normal clearance of hepatic activity at the   end of thestudy.    IMPRESSION: Gallbladder ejection fraction of 82%.    --- End of Report ---    < end of copied text >      Assessment/Plan  Patient s/p recent laparoscopy and RAIZA, history of RYGB. Readmitted with abdominal pain likely post surgical  -CT images reviewed no intra-abdominal pathology noted that can explain her symptoms  -[facility] demonstrates normal EF, no evidence of biliary dyskinesia  -multimodal pain control  -vte ppx  -protonix  -zofran and reglan for nausea  -IVF  -bariatric diet.

## 2022-11-15 NOTE — DIETITIAN INITIAL EVALUATION ADULT - ENERGY INTAKE
Poor (<50%) Presents with poor appetite/po intake due to pain/naseaous, consuming <25% of meals. No BM noted in house. No reported difficulty chewing or swallowing. NKFA, noted foods that pt can't tolerate. No weight hx obtained at this time, previous reported intentional weight loss secondary to hx bariatric sx; reports weight loss was gradual, will follow-up, current adm weight 155#, will continue to monitor weight trends as able.     Pertinent medications/nutrition labs reviewed; noted hypokalemia likely related to poor po intake; receiving LR in house. Education is not appropriate at this time, noted previous gave low fat diet education. RD to continue to monitor nutrition status per protocol.

## 2022-11-16 ENCOUNTER — TRANSCRIPTION ENCOUNTER (OUTPATIENT)
Age: 27
End: 2022-11-16

## 2022-11-16 LAB
ALBUMIN SERPL ELPH-MCNC: 3.4 G/DL — SIGNIFICANT CHANGE UP (ref 3.3–5)
ALP SERPL-CCNC: 60 U/L — SIGNIFICANT CHANGE UP (ref 30–120)
ALT FLD-CCNC: 21 U/L DA — SIGNIFICANT CHANGE UP (ref 10–60)
ANION GAP SERPL CALC-SCNC: 12 MMOL/L — SIGNIFICANT CHANGE UP (ref 5–17)
AST SERPL-CCNC: 16 U/L — SIGNIFICANT CHANGE UP (ref 10–40)
BILIRUB SERPL-MCNC: 0.4 MG/DL — SIGNIFICANT CHANGE UP (ref 0.2–1.2)
BUN SERPL-MCNC: 9 MG/DL — SIGNIFICANT CHANGE UP (ref 7–23)
CALCIUM SERPL-MCNC: 8.7 MG/DL — SIGNIFICANT CHANGE UP (ref 8.4–10.5)
CHLORIDE SERPL-SCNC: 99 MMOL/L — SIGNIFICANT CHANGE UP (ref 96–108)
CO2 SERPL-SCNC: 24 MMOL/L — SIGNIFICANT CHANGE UP (ref 22–31)
CREAT SERPL-MCNC: 0.75 MG/DL — SIGNIFICANT CHANGE UP (ref 0.5–1.3)
EGFR: 110 ML/MIN/1.73M2 — SIGNIFICANT CHANGE UP
GLUCOSE SERPL-MCNC: 88 MG/DL — SIGNIFICANT CHANGE UP (ref 70–99)
HCT VFR BLD CALC: 38.5 % — SIGNIFICANT CHANGE UP (ref 34.5–45)
HGB BLD-MCNC: 13 G/DL — SIGNIFICANT CHANGE UP (ref 11.5–15.5)
MCHC RBC-ENTMCNC: 28 PG — SIGNIFICANT CHANGE UP (ref 27–34)
MCHC RBC-ENTMCNC: 33.8 GM/DL — SIGNIFICANT CHANGE UP (ref 32–36)
MCV RBC AUTO: 82.8 FL — SIGNIFICANT CHANGE UP (ref 80–100)
NRBC # BLD: 0 /100 WBCS — SIGNIFICANT CHANGE UP (ref 0–0)
PLATELET # BLD AUTO: 280 K/UL — SIGNIFICANT CHANGE UP (ref 150–400)
POTASSIUM SERPL-MCNC: 3.8 MMOL/L — SIGNIFICANT CHANGE UP (ref 3.5–5.3)
POTASSIUM SERPL-SCNC: 3.8 MMOL/L — SIGNIFICANT CHANGE UP (ref 3.5–5.3)
PROT SERPL-MCNC: 7 G/DL — SIGNIFICANT CHANGE UP (ref 6–8.3)
RBC # BLD: 4.65 M/UL — SIGNIFICANT CHANGE UP (ref 3.8–5.2)
RBC # FLD: 14.9 % — HIGH (ref 10.3–14.5)
SODIUM SERPL-SCNC: 135 MMOL/L — SIGNIFICANT CHANGE UP (ref 135–145)
WBC # BLD: 8.44 K/UL — SIGNIFICANT CHANGE UP (ref 3.8–10.5)
WBC # FLD AUTO: 8.44 K/UL — SIGNIFICANT CHANGE UP (ref 3.8–10.5)

## 2022-11-16 PROCEDURE — 99221 1ST HOSP IP/OBS SF/LOW 40: CPT

## 2022-11-16 RX ORDER — GABAPENTIN 400 MG/1
100 CAPSULE ORAL EVERY 8 HOURS
Refills: 0 | Status: DISCONTINUED | OUTPATIENT
Start: 2022-11-16 | End: 2022-11-18

## 2022-11-16 RX ORDER — IBUPROFEN 200 MG
800 TABLET ORAL EVERY 6 HOURS
Refills: 0 | Status: COMPLETED | OUTPATIENT
Start: 2022-11-16 | End: 2022-11-18

## 2022-11-16 RX ORDER — LACTULOSE 10 G/15ML
30 SOLUTION ORAL DAILY
Refills: 0 | Status: COMPLETED | OUTPATIENT
Start: 2022-11-16 | End: 2022-11-16

## 2022-11-16 RX ORDER — HYDROMORPHONE HYDROCHLORIDE 2 MG/ML
0.5 INJECTION INTRAMUSCULAR; INTRAVENOUS; SUBCUTANEOUS ONCE
Refills: 0 | Status: DISCONTINUED | OUTPATIENT
Start: 2022-11-16 | End: 2022-11-16

## 2022-11-16 RX ORDER — DULOXETINE HYDROCHLORIDE 30 MG/1
30 CAPSULE, DELAYED RELEASE ORAL DAILY
Refills: 0 | Status: DISCONTINUED | OUTPATIENT
Start: 2022-11-16 | End: 2022-11-18

## 2022-11-16 RX ORDER — ACETAMINOPHEN 500 MG
1000 TABLET ORAL EVERY 6 HOURS
Refills: 0 | Status: COMPLETED | OUTPATIENT
Start: 2022-11-16 | End: 2022-11-17

## 2022-11-16 RX ORDER — GABAPENTIN 400 MG/1
300 CAPSULE ORAL EVERY 8 HOURS
Refills: 0 | Status: DISCONTINUED | OUTPATIENT
Start: 2022-11-16 | End: 2022-11-16

## 2022-11-16 RX ORDER — PREGABALIN 225 MG/1
1000 CAPSULE ORAL DAILY
Refills: 0 | Status: DISCONTINUED | OUTPATIENT
Start: 2022-11-16 | End: 2022-11-18

## 2022-11-16 RX ORDER — THIAMINE MONONITRATE (VIT B1) 100 MG
100 TABLET ORAL DAILY
Refills: 0 | Status: DISCONTINUED | OUTPATIENT
Start: 2022-11-16 | End: 2022-11-18

## 2022-11-16 RX ORDER — ONDANSETRON 8 MG/1
4 TABLET, FILM COATED ORAL ONCE
Refills: 0 | Status: COMPLETED | OUTPATIENT
Start: 2022-11-16 | End: 2022-11-16

## 2022-11-16 RX ORDER — OXYCODONE HYDROCHLORIDE 5 MG/1
5 TABLET ORAL EVERY 4 HOURS
Refills: 0 | Status: DISCONTINUED | OUTPATIENT
Start: 2022-11-16 | End: 2022-11-18

## 2022-11-16 RX ADMIN — SCOPALAMINE 1 PATCH: 1 PATCH, EXTENDED RELEASE TRANSDERMAL at 19:14

## 2022-11-16 RX ADMIN — LACTULOSE 30 GRAM(S): 10 SOLUTION ORAL at 17:47

## 2022-11-16 RX ADMIN — SCOPALAMINE 1 PATCH: 1 PATCH, EXTENDED RELEASE TRANSDERMAL at 07:00

## 2022-11-16 RX ADMIN — OXYCODONE HYDROCHLORIDE 5 MILLIGRAM(S): 5 TABLET ORAL at 14:22

## 2022-11-16 RX ADMIN — PANTOPRAZOLE SODIUM 40 MILLIGRAM(S): 20 TABLET, DELAYED RELEASE ORAL at 12:34

## 2022-11-16 RX ADMIN — Medication 400 MILLIGRAM(S): at 13:44

## 2022-11-16 RX ADMIN — Medication 800 MILLIGRAM(S): at 14:30

## 2022-11-16 RX ADMIN — ONDANSETRON 4 MILLIGRAM(S): 8 TABLET, FILM COATED ORAL at 20:59

## 2022-11-16 RX ADMIN — Medication 400 MILLIGRAM(S): at 19:51

## 2022-11-16 RX ADMIN — Medication 1000 MILLIGRAM(S): at 18:40

## 2022-11-16 RX ADMIN — Medication 1000 MILLIGRAM(S): at 06:55

## 2022-11-16 RX ADMIN — HYDROMORPHONE HYDROCHLORIDE 0.5 MILLIGRAM(S): 2 INJECTION INTRAMUSCULAR; INTRAVENOUS; SUBCUTANEOUS at 08:45

## 2022-11-16 RX ADMIN — GABAPENTIN 100 MILLIGRAM(S): 400 CAPSULE ORAL at 17:28

## 2022-11-16 RX ADMIN — Medication 1 GRAM(S): at 06:25

## 2022-11-16 RX ADMIN — ONDANSETRON 4 MILLIGRAM(S): 8 TABLET, FILM COATED ORAL at 01:24

## 2022-11-16 RX ADMIN — HYDROMORPHONE HYDROCHLORIDE 0.5 MILLIGRAM(S): 2 INJECTION INTRAMUSCULAR; INTRAVENOUS; SUBCUTANEOUS at 01:26

## 2022-11-16 RX ADMIN — HYDROMORPHONE HYDROCHLORIDE 0.5 MILLIGRAM(S): 2 INJECTION INTRAMUSCULAR; INTRAVENOUS; SUBCUTANEOUS at 20:59

## 2022-11-16 RX ADMIN — Medication 400 MILLIGRAM(S): at 23:59

## 2022-11-16 RX ADMIN — Medication 1 GRAM(S): at 17:47

## 2022-11-16 RX ADMIN — Medication 1000 MILLIGRAM(S): at 13:10

## 2022-11-16 RX ADMIN — DULOXETINE HYDROCHLORIDE 30 MILLIGRAM(S): 30 CAPSULE, DELAYED RELEASE ORAL at 17:47

## 2022-11-16 RX ADMIN — HYDROMORPHONE HYDROCHLORIDE 0.5 MILLIGRAM(S): 2 INJECTION INTRAMUSCULAR; INTRAVENOUS; SUBCUTANEOUS at 05:05

## 2022-11-16 RX ADMIN — HYDROMORPHONE HYDROCHLORIDE 0.5 MILLIGRAM(S): 2 INJECTION INTRAMUSCULAR; INTRAVENOUS; SUBCUTANEOUS at 04:35

## 2022-11-16 RX ADMIN — Medication 400 MILLIGRAM(S): at 12:32

## 2022-11-16 RX ADMIN — ONDANSETRON 4 MILLIGRAM(S): 8 TABLET, FILM COATED ORAL at 16:19

## 2022-11-16 RX ADMIN — Medication 400 MILLIGRAM(S): at 06:25

## 2022-11-16 RX ADMIN — OXYCODONE HYDROCHLORIDE 5 MILLIGRAM(S): 5 TABLET ORAL at 15:10

## 2022-11-16 RX ADMIN — Medication 1 ENEMA: at 23:59

## 2022-11-16 RX ADMIN — HYDROMORPHONE HYDROCHLORIDE 0.5 MILLIGRAM(S): 2 INJECTION INTRAMUSCULAR; INTRAVENOUS; SUBCUTANEOUS at 01:41

## 2022-11-16 RX ADMIN — Medication 800 MILLIGRAM(S): at 20:22

## 2022-11-16 RX ADMIN — Medication 400 MILLIGRAM(S): at 18:00

## 2022-11-16 RX ADMIN — HYDROMORPHONE HYDROCHLORIDE 0.5 MILLIGRAM(S): 2 INJECTION INTRAMUSCULAR; INTRAVENOUS; SUBCUTANEOUS at 21:29

## 2022-11-16 RX ADMIN — HYDROMORPHONE HYDROCHLORIDE 0.5 MILLIGRAM(S): 2 INJECTION INTRAMUSCULAR; INTRAVENOUS; SUBCUTANEOUS at 08:19

## 2022-11-16 NOTE — PROGRESS NOTE ADULT - SUBJECTIVE AND OBJECTIVE BOX
Progress note Surgery PA    HPI:   27y year old Female POD #6 s/p ex lap and RAIZA.  Pt seen and examined at the bedside.   Pt c/o the same epigastric pain. Vomited once yesterday. Admits to nausea. States Dilaudid is the only medication that controls the pain. Once pain is controlled, her nausea resolves as well but returns when the medication wears off. Pt no bm since Monday.     VITALS:  Vital Signs Last 24 Hrs  T(C): 36.7 (16 Nov 2022 09:00), Max: 36.9 (15 Nov 2022 18:33)  T(F): 98 (16 Nov 2022 09:00), Max: 98.5 (16 Nov 2022 05:10)  HR: 55 (16 Nov 2022 09:00) (55 - 69)  BP: 133/81 (16 Nov 2022 09:00) (124/83 - 156/87)  BP(mean): --  RR: 19 (16 Nov 2022 09:00) (18 - 19)  SpO2: 100% (16 Nov 2022 09:00) (98% - 100%)    Parameters below as of 16 Nov 2022 05:10  Patient On (Oxygen Delivery Method): room air        11-15 @ 07:01  -  11-16 @ 07:00  --------------------------------------------------------  IN: 900 mL / OUT: 0 mL / NET: 900 mL      LABS:                        13.0   8.44  )-----------( 280      ( 16 Nov 2022 08:09 )             38.5     11-16    135  |  99  |  9   ----------------------------<  88  3.8   |  24  |  0.75    Ca    8.7      16 Nov 2022 08:09    TPro  7.0  /  Alb  3.4  /  TBili  0.4  /  DBili  x   /  AST  16  /  ALT  21  /  AlkPhos  60  11-16      CAPILLARY BLOOD GLUCOSE    LIVER FUNCTIONS - ( 16 Nov 2022 08:09 )  Alb: 3.4 g/dL / Pro: 7.0 g/dL / ALK PHOS: 60 U/L / ALT: 21 U/L DA / AST: 16 U/L / GGT: x             Physical Exam:  General: A/O x 3, NAD  Abdominal: soft, ND. mild tenderness in epigastric region. No guarding.      Incisions: incisions clean, dry and intact. derma bond present  Extremities: no edema, no calf tenderness B/L    Radiology and Additional Studies:  < from: NM Hepatobiliary Imaging w/ RX (11.14.22 @ 13:18) >  PROCEDURE DATE:  11/14/2022          INTERPRETATION:  RADIOPHARMACEUTICAL: 3.5 mCi Tc-99m-Mebrofenin, I.V.    CLINICAL INFORMATION: 27 year old female with abdominal pain and history   of sleeve gastric bypass; referred to evaluate for biliary   dyskinesia/chronic acalculous cholecystitis.    TECHNIQUE:  An anterior static image of the abdomen was obtained   approximately 60 minutes after radiopharmaceutical injection, followed by   dynamic imaging of the anterior abdomen for 65 minutes.  Five minutes   after beginning dynamic imaging, sincalide 1.4 ug diluted in 50 cc normal   saline was infused intravenously over 60 minutes.    COMPARISON: No prior hepatobiliary scan is available for comparison.    FINDINGS:  The gall bladder is visualized on the 60 minutes post   injection image. The calculated gallbladder ejection fraction is   82%(normal: > 38%). There is normal clearance of hepatic activity at the   end of thestudy.    IMPRESSION: Gallbladder ejection fraction of 82%.    --- End of Report ---    < end of copied text >    < from: CT Abdomen and Pelvis w/ Oral Cont and w/ IV Cont (11.13.22 @ 19:59) >  PROCEDURE DATE:  11/13/2022          INTERPRETATION:  CLINICAL INFORMATION: Abdominal pain. Recent lysis of   adhesions.    COMPARISON: CT abdomen pelvis 10/10/2022    CONTRAST/COMPLICATIONS:  IV Contrast: Omnipaque 350  90 cc administered   10 cc discarded  Oral Contrast: Smoothie Readi-Cat 2  Complications: None reported at time of study completion    PROCEDURE:  CT of the Abdomen and Pelvis was performed.  Sagittal andcoronal reformats were performed.    FINDINGS:  LOWER CHEST: Partially imaged breast implants. Small hiatal hernia.    LIVER: Within normal limits.  BILE DUCTS: Normal caliber.  GALLBLADDER: Within normal limits.  SPLEEN: Within normal limits.  PANCREAS: Within normal limits.  ADRENALS: Within normal limits.  KIDNEYS/URETERS: Subcentimeter stable hypodense right renal lesion,   statistically likely representing a cyst..    BLADDER: Within normal limits.  REPRODUCTIVE ORGANS: Routine is enhancement of uterus. Left adnexal cyst   measures 1.6 cm.    BOWEL: Status post gastric bypass. JJ anastomosis in the left upper   quadrant, similar to prior study. No bowel obstruction or evidence of   internal hernia. Normal appendix.  PERITONEUM: No ascites.  VESSELS: Within normal limits.  RETROPERITONEUM/LYMPH NODES: No lymphadenopathy.  ABDOMINAL WALL: New thickening of the umbilicus. New right ventral   abdominal wall skin thickening and subjacent infiltration at the level of   the umbilicus. Thesefindings likely relate to recent surgery.  BONES: Within normal limits.    IMPRESSION:  Status post gastric bypass. No bowel obstruction or evidence of internal   hernia.        A/P:   Patient s/p recent laparoscopy and RAIZA, history of RYGB. Readmitted with abdominal pain likely post surgical    -CT images reviewed no intra-abdominal pathology noted that can explain her symptoms  -[facility] HIDA demonstrates normal EF, no evidence of biliary dyskinesia  -multimodal pain control  -vte ppx  -protonix  -zofran and levsin and scop patches for nausea  -IVF  -bariatric diet.  -pending hpylori antigen stool test  -GI consulted

## 2022-11-16 NOTE — BH CONSULTATION LIAISON ASSESSMENT NOTE - NSBHCHARTREVIEWLAB_PSY_A_CORE FT
13.0   8.44  )-----------( 280      ( 16 Nov 2022 08:09 )             38.5   11-16    135  |  99  |  9   ----------------------------<  88  3.8   |  24  |  0.75    Ca    8.7      16 Nov 2022 08:09    TPro  7.0  /  Alb  3.4  /  TBili  0.4  /  DBili  x   /  AST  16  /  ALT  21  /  AlkPhos  60  11-16

## 2022-11-16 NOTE — PROGRESS NOTE ADULT - SUBJECTIVE AND OBJECTIVE BOX
INTERVAL HPI/OVERNIGHT EVENTS:    No new overnight event.  No N/V/D.  Tolerating diet clearts abd pain periumbilical to epigastric area  requiring dilaudid    Allergies    penicillins (Unknown)    Intolerances    General:  No wt loss, fevers, chills, night sweats, fatigue,   Eyes:  Good vision, no reported pain  ENT:  No sore throat, pain, runny nose, dysphagia  CV:  No pain, palpitations, hypo/hypertension  Resp:  No dyspnea, cough, tachypnea, wheezing  GI:  No pain, No nausea, No vomiting, No diarrhea, No constipation, No weight loss, No fever, No pruritis, No rectal bleeding, No tarry stools, No dysphagia,  :  No pain, bleeding, incontinence, nocturia  Muscle:  No pain, weakness  Neuro:  No weakness, tingling, memory problems  Psych:  No fatigue, insomnia, mood problems, depression  Endocrine:  No polyuria, polydipsia, cold/heat intolerance  Heme:  No petechiae, ecchymosis, easy bruisability  Skin:  No rash, tattoos, scars, edema      PHYSICAL EXAM:   Vital Signs:  Vital Signs Last 24 Hrs  T(C): 36.4 (2022 14:06), Max: 36.9 (15 Nov 2022 18:33)  T(F): 97.5 (2022 14:06), Max: 98.5 (2022 05:10)  HR: 59 (2022 14:06) (55 - 69)  BP: 142/84 (2022 14:06) (124/83 - 148/88)  BP(mean): --  RR: 19 (2022 14:06) (18 - 19)  SpO2: 100% (2022 14:06) (98% - 100%)    Parameters below as of 2022 05:10  Patient On (Oxygen Delivery Method): room air      Daily     Daily Weight in k.3 (2022 05:10)I&O's Summary    15 Nov 2022 07:01  -  2022 07:00  --------------------------------------------------------  IN: 900 mL / OUT: 0 mL / NET: 900 mL        GENERAL:  Appears stated age, well-groomed, well-nourished, no distress  HEENT:  NC/AT,  conjunctivae clear and pink, no thyromegaly, nodules, adenopathy, no JVD, sclera -anicteric  CHEST:  Full & symmetric excursion, no increased effort, breath sounds clear  HEART:  Regular rhythm, S1, S2, no murmur/rub/S3/S4, no abdominal bruit, no edema  ABDOMEN:  Soft, non-tender, non-distended, normoactive bowel sounds,  no masses ,no hepato-splenomegaly, no signs of chronic liver disease  EXTEREMITIES:  no cyanosis,clubbing or edema  SKIN:  No rash/erythema/ecchymoses/petechiae/wounds/abscess/warm/dry  NEURO:  Alert, oriented, no asterixis, no tremor, no encephalopathy      LABS:                        13.0   8.44  )-----------( 280      ( 2022 08:09 )             38.5         135  |  99  |  9   ----------------------------<  88  3.8   |  24  |  0.75    Ca    8.7      2022 08:09    TPro  7.0  /  Alb  3.4  /  TBili  0.4  /  DBili  x   /  AST  16  /  ALT  21  /  AlkPhos  60          amylase   lipaseLipase, Serum: 142 U/L ( @ 16:55)    RADIOLOGY & ADDITIONAL TESTS:

## 2022-11-16 NOTE — BH CONSULTATION LIAISON ASSESSMENT NOTE - CURRENT MEDICATION
MEDICATIONS  (STANDING):  acetaminophen   IVPB .. 1000 milliGRAM(s) IV Intermittent every 6 hours  buPROPion XL (24-Hour) . 300 milliGRAM(s) Oral daily  cyanocobalamin 1000 MICROGram(s) Oral daily  DULoxetine 30 milliGRAM(s) Oral daily  enoxaparin Injectable 40 milliGRAM(s) SubCutaneous every 24 hours  gabapentin 100 milliGRAM(s) Oral every 8 hours  ibuprofen IVPB .. 800 milliGRAM(s) IV Intermittent every 6 hours  multivitamin 1 Tablet(s) Oral daily  pantoprazole  Injectable 40 milliGRAM(s) IV Push daily  scopolamine 1 mG/72 Hr(s) Patch 1 Patch Transdermal every 72 hours  senna 2 Tablet(s) Oral at bedtime  sucralfate suspension 1 Gram(s) Oral two times a day  thiamine 100 milliGRAM(s) Oral daily    MEDICATIONS  (PRN):  aluminum hydroxide/magnesium hydroxide/simethicone Suspension 30 milliLiter(s) Oral every 4 hours PRN Dyspepsia  hyoscyamine SL 0.125 milliGRAM(s) SubLingual every 6 hours PRN nausea  lactulose Syrup 20 Gram(s) Oral daily PRN constipartion  ondansetron Injectable 4 milliGRAM(s) IV Push every 6 hours PRN Nausea and/or Vomiting  oxyCODONE    IR 5 milliGRAM(s) Oral every 4 hours PRN Severe Pain (7 - 10)  simethicone 80 milliGRAM(s) Chew every 8 hours PRN Gas

## 2022-11-16 NOTE — BH CONSULTATION LIAISON ASSESSMENT NOTE - HPI (INCLUDE ILLNESS QUALITY, SEVERITY, DURATION, TIMING, CONTEXT, MODIFYING FACTORS, ASSOCIATED SIGNS AND SYMPTOMS)
Patient seen, evaluated and chart reviewed. Patient is a 28 y/o SWF, with no prior psychiatric hospitalizations, history of anxiety and depression, under outpatient care, POD#4 of ex lap with RAIZA presented to ED c/o abd pain, nausea. Pshx of sleeve to gastric bypass in 2020. States abd pain changed from RUQ to midline from umbilicus to breast bone. Not associated with food. Today pain 5/10 with pain meds. Describes pain as pressure, stabbing, and a sensation that there is something in her chest.  No nausea or vomiting today. +flatus and BM. Patient continued to complain of pain and the issue of using Cymbalta was raised. Patient at this time wants to start the medication to address both the pain and mood issues.

## 2022-11-16 NOTE — BH CONSULTATION LIAISON ASSESSMENT NOTE - NSBHCHARTREVIEWVS_PSY_A_CORE FT
Vital Signs Last 24 Hrs  T(C): 36.4 (16 Nov 2022 14:06), Max: 36.9 (15 Nov 2022 18:33)  T(F): 97.5 (16 Nov 2022 14:06), Max: 98.5 (16 Nov 2022 05:10)  HR: 59 (16 Nov 2022 14:06) (55 - 69)  BP: 142/84 (16 Nov 2022 14:06) (124/83 - 148/88)  BP(mean): --  RR: 19 (16 Nov 2022 14:06) (18 - 19)  SpO2: 100% (16 Nov 2022 14:06) (98% - 100%)    Parameters below as of 16 Nov 2022 05:10  Patient On (Oxygen Delivery Method): room air

## 2022-11-17 ENCOUNTER — RESULT REVIEW (OUTPATIENT)
Age: 27
End: 2022-11-17

## 2022-11-17 LAB
ANION GAP SERPL CALC-SCNC: 10 MMOL/L — SIGNIFICANT CHANGE UP (ref 5–17)
BUN SERPL-MCNC: 6 MG/DL — LOW (ref 7–23)
CALCIUM SERPL-MCNC: 8.6 MG/DL — SIGNIFICANT CHANGE UP (ref 8.4–10.5)
CHLORIDE SERPL-SCNC: 96 MMOL/L — SIGNIFICANT CHANGE UP (ref 96–108)
CO2 SERPL-SCNC: 26 MMOL/L — SIGNIFICANT CHANGE UP (ref 22–31)
CREAT SERPL-MCNC: 0.68 MG/DL — SIGNIFICANT CHANGE UP (ref 0.5–1.3)
EGFR: 121 ML/MIN/1.73M2 — SIGNIFICANT CHANGE UP
FOLATE SERPL-MCNC: 14.4 NG/ML — SIGNIFICANT CHANGE UP
GLUCOSE SERPL-MCNC: 96 MG/DL — SIGNIFICANT CHANGE UP (ref 70–99)
HCT VFR BLD CALC: 35.4 % — SIGNIFICANT CHANGE UP (ref 34.5–45)
HGB BLD-MCNC: 12.2 G/DL — SIGNIFICANT CHANGE UP (ref 11.5–15.5)
MCHC RBC-ENTMCNC: 28.1 PG — SIGNIFICANT CHANGE UP (ref 27–34)
MCHC RBC-ENTMCNC: 34.5 GM/DL — SIGNIFICANT CHANGE UP (ref 32–36)
MCV RBC AUTO: 81.6 FL — SIGNIFICANT CHANGE UP (ref 80–100)
NRBC # BLD: 0 /100 WBCS — SIGNIFICANT CHANGE UP (ref 0–0)
PLATELET # BLD AUTO: 263 K/UL — SIGNIFICANT CHANGE UP (ref 150–400)
POTASSIUM SERPL-MCNC: 4.1 MMOL/L — SIGNIFICANT CHANGE UP (ref 3.5–5.3)
POTASSIUM SERPL-SCNC: 4.1 MMOL/L — SIGNIFICANT CHANGE UP (ref 3.5–5.3)
RBC # BLD: 4.34 M/UL — SIGNIFICANT CHANGE UP (ref 3.8–5.2)
RBC # FLD: 14.6 % — HIGH (ref 10.3–14.5)
SODIUM SERPL-SCNC: 132 MMOL/L — LOW (ref 135–145)
VIT B12 SERPL-MCNC: 408 PG/ML — SIGNIFICANT CHANGE UP (ref 232–1245)
WBC # BLD: 8.44 K/UL — SIGNIFICANT CHANGE UP (ref 3.8–10.5)
WBC # FLD AUTO: 8.44 K/UL — SIGNIFICANT CHANGE UP (ref 3.8–10.5)

## 2022-11-17 PROCEDURE — 88312 SPECIAL STAINS GROUP 1: CPT | Mod: 26

## 2022-11-17 PROCEDURE — 88305 TISSUE EXAM BY PATHOLOGIST: CPT | Mod: 26

## 2022-11-17 DEVICE — RETRIEVAL FOOD BOLUS ROTHNET: Type: IMPLANTABLE DEVICE | Status: FUNCTIONAL

## 2022-11-17 DEVICE — RESOLUTION CLIP HEMOSTATIC DEVICE: Type: IMPLANTABLE DEVICE | Status: FUNCTIONAL

## 2022-11-17 DEVICE — CLIP RESOLUTION 360 235CM: Type: IMPLANTABLE DEVICE | Status: FUNCTIONAL

## 2022-11-17 DEVICE — SPEEDBAND SPRVW 7: Type: IMPLANTABLE DEVICE | Status: FUNCTIONAL

## 2022-11-17 DEVICE — CATH ESOPH DIL 8 ATM 6FR12-15M: Type: IMPLANTABLE DEVICE | Status: FUNCTIONAL

## 2022-11-17 RX ORDER — SODIUM CHLORIDE 9 MG/ML
1000 INJECTION, SOLUTION INTRAVENOUS
Refills: 0 | Status: DISCONTINUED | OUTPATIENT
Start: 2022-11-17 | End: 2022-11-17

## 2022-11-17 RX ORDER — HYDROMORPHONE HYDROCHLORIDE 2 MG/ML
0.5 INJECTION INTRAMUSCULAR; INTRAVENOUS; SUBCUTANEOUS ONCE
Refills: 0 | Status: DISCONTINUED | OUTPATIENT
Start: 2022-11-17 | End: 2022-11-17

## 2022-11-17 RX ORDER — ACETAMINOPHEN 500 MG
1000 TABLET ORAL ONCE
Refills: 0 | Status: COMPLETED | OUTPATIENT
Start: 2022-11-17 | End: 2022-11-17

## 2022-11-17 RX ORDER — LACTULOSE 10 G/15ML
30 SOLUTION ORAL ONCE
Refills: 0 | Status: DISCONTINUED | OUTPATIENT
Start: 2022-11-17 | End: 2022-11-18

## 2022-11-17 RX ORDER — HYOSCYAMINE SULFATE 0.13 MG
0.25 TABLET ORAL EVERY 4 HOURS
Refills: 0 | Status: DISCONTINUED | OUTPATIENT
Start: 2022-11-17 | End: 2022-11-18

## 2022-11-17 RX ORDER — SODIUM CHLORIDE 9 MG/ML
1000 INJECTION INTRAMUSCULAR; INTRAVENOUS; SUBCUTANEOUS
Refills: 0 | Status: DISCONTINUED | OUTPATIENT
Start: 2022-11-17 | End: 2022-11-17

## 2022-11-17 RX ADMIN — Medication 1000 MILLIGRAM(S): at 21:55

## 2022-11-17 RX ADMIN — Medication 400 MILLIGRAM(S): at 21:55

## 2022-11-17 RX ADMIN — Medication 1 GRAM(S): at 06:15

## 2022-11-17 RX ADMIN — ONDANSETRON 4 MILLIGRAM(S): 8 TABLET, FILM COATED ORAL at 23:30

## 2022-11-17 RX ADMIN — Medication 400 MILLIGRAM(S): at 11:59

## 2022-11-17 RX ADMIN — OXYCODONE HYDROCHLORIDE 5 MILLIGRAM(S): 5 TABLET ORAL at 10:53

## 2022-11-17 RX ADMIN — Medication 400 MILLIGRAM(S): at 06:15

## 2022-11-17 RX ADMIN — Medication 400 MILLIGRAM(S): at 02:08

## 2022-11-17 RX ADMIN — Medication 1000 MILLIGRAM(S): at 00:30

## 2022-11-17 RX ADMIN — SCOPALAMINE 1 PATCH: 1 PATCH, EXTENDED RELEASE TRANSDERMAL at 19:49

## 2022-11-17 RX ADMIN — GABAPENTIN 100 MILLIGRAM(S): 400 CAPSULE ORAL at 13:52

## 2022-11-17 RX ADMIN — GABAPENTIN 100 MILLIGRAM(S): 400 CAPSULE ORAL at 21:52

## 2022-11-17 RX ADMIN — Medication 400 MILLIGRAM(S): at 13:52

## 2022-11-17 RX ADMIN — HYDROMORPHONE HYDROCHLORIDE 0.5 MILLIGRAM(S): 2 INJECTION INTRAMUSCULAR; INTRAVENOUS; SUBCUTANEOUS at 04:30

## 2022-11-17 RX ADMIN — OXYCODONE HYDROCHLORIDE 5 MILLIGRAM(S): 5 TABLET ORAL at 23:10

## 2022-11-17 RX ADMIN — Medication 0.25 MILLIGRAM(S): at 21:51

## 2022-11-17 RX ADMIN — Medication 400 MILLIGRAM(S): at 08:09

## 2022-11-17 RX ADMIN — OXYCODONE HYDROCHLORIDE 5 MILLIGRAM(S): 5 TABLET ORAL at 22:18

## 2022-11-17 RX ADMIN — SENNA PLUS 2 TABLET(S): 8.6 TABLET ORAL at 21:51

## 2022-11-17 RX ADMIN — Medication 1000 MILLIGRAM(S): at 06:45

## 2022-11-17 RX ADMIN — PANTOPRAZOLE SODIUM 40 MILLIGRAM(S): 20 TABLET, DELAYED RELEASE ORAL at 12:02

## 2022-11-17 RX ADMIN — DULOXETINE HYDROCHLORIDE 30 MILLIGRAM(S): 30 CAPSULE, DELAYED RELEASE ORAL at 12:01

## 2022-11-17 RX ADMIN — SODIUM CHLORIDE 150 MILLILITER(S): 9 INJECTION, SOLUTION INTRAVENOUS at 07:30

## 2022-11-17 RX ADMIN — ONDANSETRON 4 MILLIGRAM(S): 8 TABLET, FILM COATED ORAL at 08:27

## 2022-11-17 RX ADMIN — HYDROMORPHONE HYDROCHLORIDE 0.5 MILLIGRAM(S): 2 INJECTION INTRAMUSCULAR; INTRAVENOUS; SUBCUTANEOUS at 03:58

## 2022-11-17 RX ADMIN — Medication 800 MILLIGRAM(S): at 02:40

## 2022-11-17 RX ADMIN — Medication 1000 MILLIGRAM(S): at 13:01

## 2022-11-17 RX ADMIN — SODIUM CHLORIDE 75 MILLILITER(S): 9 INJECTION, SOLUTION INTRAVENOUS at 16:14

## 2022-11-17 RX ADMIN — Medication 0.25 MILLIGRAM(S): at 17:57

## 2022-11-17 RX ADMIN — Medication 400 MILLIGRAM(S): at 19:39

## 2022-11-17 RX ADMIN — OXYCODONE HYDROCHLORIDE 5 MILLIGRAM(S): 5 TABLET ORAL at 11:20

## 2022-11-17 RX ADMIN — GABAPENTIN 100 MILLIGRAM(S): 400 CAPSULE ORAL at 06:22

## 2022-11-17 RX ADMIN — Medication 1 GRAM(S): at 17:58

## 2022-11-17 RX ADMIN — SCOPALAMINE 1 PATCH: 1 PATCH, EXTENDED RELEASE TRANSDERMAL at 06:07

## 2022-11-17 RX ADMIN — Medication 800 MILLIGRAM(S): at 19:48

## 2022-11-17 RX ADMIN — Medication 30 MILLILITER(S): at 19:39

## 2022-11-17 NOTE — CHART NOTE - NSCHARTNOTEFT_GEN_A_CORE
Assessment: Per H&P " 28 yo F POD#4 of ex lap with RAIZA presented to ED c/o abd pain, nausea. Pshx of sleeve to gastric bypass in . States abd pain changed from RUQ to midline from umbilicus to breast bone. Not associated with food. Today pain 5/10 with pain meds. Describes pain as pressure, stabbing, and a sensation that there is something in her chest.  No nausea or vomiting today. +flatus and BM."      Pt seen for nutrition follow-up. Visited patient in room, presently NPO waiting for upper endoscopy today. Denies v/d today, feeling better, less nauseous, no BM noted so far, bowel regimen in place. Reported was tolerating po last week till last Saturday, since , appetite/po decreased, <25% EER consumed due to pain & nauseous. Pertinent medications/nutrition labs reviewed; noted receiving LR, multivitamin, vitamin B12 in house. RD to follow up to provide nutrition recommendation when appropriate/pending Gi findings.     Factors impacting intake: [ ] none [ x] nausea  [ x] vomiting [ ] diarrhea [ ] constipation  [ ]chewing problems [ ] swallowing issues  [x] other: pain    Diet Prescription: Diet, NPO:   Except Medications (22 @ 10:43)  Diet, NPO after Midnight:      NPO Start Date: 2022,   NPO Start Time: 23:59 (22 @ 16:53)    Intake: poor    Daily Weight in k.3 (2022 05:10)      Pertinent Medications: MEDICATIONS  (STANDING):  buPROPion XL (24-Hour) . 300 milliGRAM(s) Oral daily  cyanocobalamin 1000 MICROGram(s) Oral daily  DULoxetine 30 milliGRAM(s) Oral daily  enoxaparin Injectable 40 milliGRAM(s) SubCutaneous every 24 hours  gabapentin 100 milliGRAM(s) Oral every 8 hours  ibuprofen IVPB .. 800 milliGRAM(s) IV Intermittent every 6 hours  lactated ringers. 1000 milliLiter(s) (150 mL/Hr) IV Continuous <Continuous>  multivitamin 1 Tablet(s) Oral daily  pantoprazole  Injectable 40 milliGRAM(s) IV Push daily  scopolamine 1 mG/72 Hr(s) Patch 1 Patch Transdermal every 72 hours  senna 2 Tablet(s) Oral at bedtime  sucralfate suspension 1 Gram(s) Oral two times a day  thiamine 100 milliGRAM(s) Oral daily    MEDICATIONS  (PRN):  aluminum hydroxide/magnesium hydroxide/simethicone Suspension 30 milliLiter(s) Oral every 4 hours PRN Dyspepsia  hyoscyamine SL 0.125 milliGRAM(s) SubLingual every 6 hours PRN nausea  lactulose Syrup 20 Gram(s) Oral daily PRN constipartion  ondansetron Injectable 4 milliGRAM(s) IV Push every 6 hours PRN Nausea and/or Vomiting  oxyCODONE    IR 5 milliGRAM(s) Oral every 4 hours PRN Severe Pain (7 - 10)  simethicone 80 milliGRAM(s) Chew every 8 hours PRN Gas    Pertinent Labs: 11-17 Na132 mmol/L<L> Glu 96 mg/dL K+ 4.1 mmol/L Cr  0.68 mg/dL BUN 6 mg/dL<L> 11-16 Alb 3.4 g/dL     CAPILLARY BLOOD GLUCOSE    No edema or pressure injury documented per flowsheets.     Estimated Needs:   [x ] no change since previous assessment  [ ] recalculated:     Previous Nutrition Diagnosis:   [ ] Inadequate Energy Intake [ ]Inadequate Oral Intake [ ] Excessive Energy Intake   [ ] Underweight [ ] Increased Nutrient Needs [ ] Overweight/Obesity [ ] Altered Nutrition related lab values  [ x] Altered GI Function [ ] Unintended Weight Loss [ ] Food & Nutrition Related Knowledge Deficit [ ] Malnutrition     Nutrition Diagnosis is [x ] ongoing  [ ] resolved [ ] not applicable     New Nutrition Diagnosis: [x ] Inadequate Oral Intake related to inability to consume sufficient protein/energy secondary to altered GI function as evidenced by <50% EER consuming x5 days      Interventions: RD to follow up to provide nutrition recommendation when appropriate/pending Gi findings.  Recommend  [ ] Change Diet To:  [ ] Nutrition Supplement  [ ] Nutrition Support  [ ] Other:     Monitoring and Evaluation:   [X ] Intake [ x ] Tolerance to diet prescription [ x ] weights [ x ] labs[ x ] follow up per protocol  [ ] other:

## 2022-11-17 NOTE — CHART NOTE - NSCHARTNOTEFT_GEN_A_CORE
s/p  upper gastrointestinal endoscopy  mild inflammation in the rygb pouch no ulcers or narrowing pristine pouch    plan  adv diet  d/c planning  adv diet   ppi and carafate  bowel regimen  to follow up as outp    Advanced care planning was discussed with patient and family.  Advanced care planning forms were reviewed and discussed.  Risks, benefits and alternatives of gastroenterologic procedures were discussed in detail and all questions were answered.    30 minutes spent.

## 2022-11-17 NOTE — PROGRESS NOTE ADULT - SUBJECTIVE AND OBJECTIVE BOX
Progress note Surgery PS    HPI:   27y year old Female POD #7 s/p ex laparoscopy and RAIZA. Pt seen and examined at the bedside.  Patient states she feels a little better today. She is tolerating liquids as long as she has no pain and urinating well. Pt slept more last night, and had less episodes of waking up in pain.     VITALS:  Vital Signs Last 24 Hrs  T(C): 36.8 (17 Nov 2022 05:57), Max: 36.8 (16 Nov 2022 18:32)  T(F): 98.2 (17 Nov 2022 05:57), Max: 98.3 (16 Nov 2022 18:32)  HR: 56 (17 Nov 2022 05:57) (55 - 78)  BP: 150/86 (17 Nov 2022 05:57) (133/81 - 150/86)  BP(mean): --  RR: 18 (17 Nov 2022 05:57) (18 - 19)  SpO2: 97% (17 Nov 2022 05:57) (96% - 100%)    Parameters below as of 17 Nov 2022 05:57  Patient On (Oxygen Delivery Method): room air    11-16 @ 07:01  -  11-17 @ 07:00  --------------------------------------------------------  IN: 750 mL / OUT: 0 mL / NET: 750 mL    LABS:                        12.2   8.44  )-----------( 263      ( 17 Nov 2022 06:00 )             35.4     11-17    132<L>  |  96  |  6<L>  ----------------------------<  96  4.1   |  26  |  0.68    Ca    8.6      17 Nov 2022 06:00    TPro  7.0  /  Alb  3.4  /  TBili  0.4  /  DBili  x   /  AST  16  /  ALT  21  /  AlkPhos  60  11-16      CAPILLARY BLOOD GLUCOSE    LIVER FUNCTIONS - ( 16 Nov 2022 08:09 )  Alb: 3.4 g/dL / Pro: 7.0 g/dL / ALK PHOS: 60 U/L / ALT: 21 U/L DA / AST: 16 U/L / GGT: x           Physical Exam:  General: A/O x 3, NAD, resting comfortably in bed  Abdominal: soft, ND. Mild tenderness at supra umbilical region.   Incisions: incisions clean, dry and intact. Derma bond present  Extremities: no edema, no calf tenderness B/L    Radiology and Additional Studies:  < from: CT Abdomen and Pelvis w/ Oral Cont and w/ IV Cont (11.13.22 @ 19:59) >  INTERPRETATION:  CLINICAL INFORMATION: Abdominal pain. Recent lysis of   adhesions.    COMPARISON: CT abdomen pelvis 10/10/2022    CONTRAST/COMPLICATIONS:  IV Contrast: Omnipaque 350  90 cc administered   10 cc discarded  Oral Contrast: Smoothie Readi-Cat 2  Complications: None reported at time of study completion    PROCEDURE:  CT of the Abdomen and Pelvis was performed.  Sagittal andcoronal reformats were performed.    FINDINGS:  LOWER CHEST: Partially imaged breast implants. Small hiatal hernia.    LIVER: Within normal limits.  BILE DUCTS: Normal caliber.  GALLBLADDER: Within normal limits.  SPLEEN: Within normal limits.  PANCREAS: Within normal limits.  ADRENALS: Within normal limits.  KIDNEYS/URETERS: Subcentimeter stable hypodense right renal lesion,   statistically likely representing a cyst..    BLADDER: Within normal limits.  REPRODUCTIVE ORGANS: Routine is enhancement of uterus. Left adnexal cyst   measures 1.6 cm.    BOWEL: Status post gastric bypass. JJ anastomosis in the left upper   quadrant, similar to prior study. No bowel obstruction or evidence of   internal hernia. Normal appendix.  PERITONEUM: No ascites.  VESSELS: Within normal limits.  RETROPERITONEUM/LYMPH NODES: No lymphadenopathy.  ABDOMINAL WALL: New thickening of the umbilicus. New right ventral   abdominal wall skin thickening and subjacent infiltration at the level of   the umbilicus. Thesefindings likely relate to recent surgery.  BONES: Within normal limits.    IMPRESSION:  Status post gastric bypass. No bowel obstruction or evidence of internal   hernia.    < end of copied text >  < from: NM Hepatobiliary Imaging w/ RX (11.14.22 @ 13:18) >  INTERPRETATION:  RADIOPHARMACEUTICAL: 3.5 mCi Tc-99m-Mebrofenin, I.V.    CLINICAL INFORMATION: 27 year old female with abdominal pain and history   of sleeve gastric bypass; referred to evaluate for biliary   dyskinesia/chronic acalculous cholecystitis.    TECHNIQUE:  An anterior static image of the abdomen was obtained   approximately 60 minutes after radiopharmaceutical injection, followed by   dynamic imaging of the anterior abdomen for 65 minutes.  Five minutes   after beginning dynamic imaging, sincalide 1.4 ug diluted in 50 cc normal   saline was infused intravenously over 60 minutes.    COMPARISON: No prior hepatobiliary scan is available for comparison.    FINDINGS:  The gall bladder is visualized on the 60 minutes post   injection image. The calculated gallbladder ejection fraction is   82%(normal: > 38%). There is normal clearance of hepatic activity at the   end of thestudy.    IMPRESSION: Gallbladder ejection fraction of 82%.    --- End of Report ---    < end of copied text >    A/P:   26yo F s/p recent laparoscopy and RAIZA, history of RYGB. Readmitted with abdominal pain likely post surgical  -CT images reviewed no intra-abdominal pathology noted that can explain her symptoms  -[facility] HIDA demonstrates normal EF, no evidence of biliary dyskinesia  -multimodal pain control  -vte ppx  -protonix and carafate  -zofran and levsin and scop patches for nausea  -IVF  -pending hpylori antigen stool test  -GI recs appreciated, pt having upper endoscopy today  -Behavioral health recs appreciated

## 2022-11-18 ENCOUNTER — TRANSCRIPTION ENCOUNTER (OUTPATIENT)
Age: 27
End: 2022-11-18

## 2022-11-18 ENCOUNTER — APPOINTMENT (OUTPATIENT)
Dept: BARIATRICS | Facility: CLINIC | Age: 27
End: 2022-11-18

## 2022-11-18 VITALS
DIASTOLIC BLOOD PRESSURE: 91 MMHG | TEMPERATURE: 98 F | HEART RATE: 78 BPM | SYSTOLIC BLOOD PRESSURE: 147 MMHG | RESPIRATION RATE: 18 BRPM | OXYGEN SATURATION: 99 %

## 2022-11-18 LAB
ANION GAP SERPL CALC-SCNC: 10 MMOL/L — SIGNIFICANT CHANGE UP (ref 5–17)
BUN SERPL-MCNC: 6 MG/DL — LOW (ref 7–23)
CA-I BLD-SCNC: 5 MG/DL — SIGNIFICANT CHANGE UP (ref 4.5–5.6)
CALCIUM SERPL-MCNC: 8.6 MG/DL — SIGNIFICANT CHANGE UP (ref 8.4–10.5)
CHLORIDE SERPL-SCNC: 97 MMOL/L — SIGNIFICANT CHANGE UP (ref 96–108)
CO2 SERPL-SCNC: 27 MMOL/L — SIGNIFICANT CHANGE UP (ref 22–31)
CREAT SERPL-MCNC: 0.71 MG/DL — SIGNIFICANT CHANGE UP (ref 0.5–1.3)
EGFR: 118 ML/MIN/1.73M2 — SIGNIFICANT CHANGE UP
GLUCOSE SERPL-MCNC: 78 MG/DL — SIGNIFICANT CHANGE UP (ref 70–99)
HCT VFR BLD CALC: 34.8 % — SIGNIFICANT CHANGE UP (ref 34.5–45)
HGB BLD-MCNC: 12.1 G/DL — SIGNIFICANT CHANGE UP (ref 11.5–15.5)
MCHC RBC-ENTMCNC: 28.5 PG — SIGNIFICANT CHANGE UP (ref 27–34)
MCHC RBC-ENTMCNC: 34.8 GM/DL — SIGNIFICANT CHANGE UP (ref 32–36)
MCV RBC AUTO: 81.9 FL — SIGNIFICANT CHANGE UP (ref 80–100)
NRBC # BLD: 0 /100 WBCS — SIGNIFICANT CHANGE UP (ref 0–0)
PLATELET # BLD AUTO: 313 K/UL — SIGNIFICANT CHANGE UP (ref 150–400)
POTASSIUM SERPL-MCNC: 4 MMOL/L — SIGNIFICANT CHANGE UP (ref 3.5–5.3)
POTASSIUM SERPL-SCNC: 4 MMOL/L — SIGNIFICANT CHANGE UP (ref 3.5–5.3)
RBC # BLD: 4.25 M/UL — SIGNIFICANT CHANGE UP (ref 3.8–5.2)
RBC # FLD: 14.7 % — HIGH (ref 10.3–14.5)
SODIUM SERPL-SCNC: 134 MMOL/L — LOW (ref 135–145)
WBC # BLD: 7.89 K/UL — SIGNIFICANT CHANGE UP (ref 3.8–10.5)
WBC # FLD AUTO: 7.89 K/UL — SIGNIFICANT CHANGE UP (ref 3.8–10.5)

## 2022-11-18 PROCEDURE — 82607 VITAMIN B-12: CPT

## 2022-11-18 PROCEDURE — 85027 COMPLETE CBC AUTOMATED: CPT

## 2022-11-18 PROCEDURE — 99285 EMERGENCY DEPT VISIT HI MDM: CPT

## 2022-11-18 PROCEDURE — 84597 ASSAY OF VITAMIN K: CPT

## 2022-11-18 PROCEDURE — 82746 ASSAY OF FOLIC ACID SERUM: CPT

## 2022-11-18 PROCEDURE — 84425 ASSAY OF VITAMIN B-1: CPT

## 2022-11-18 PROCEDURE — 86900 BLOOD TYPING SEROLOGIC ABO: CPT

## 2022-11-18 PROCEDURE — 96376 TX/PRO/DX INJ SAME DRUG ADON: CPT

## 2022-11-18 PROCEDURE — 36415 COLL VENOUS BLD VENIPUNCTURE: CPT

## 2022-11-18 PROCEDURE — 84630 ASSAY OF ZINC: CPT

## 2022-11-18 PROCEDURE — 84590 ASSAY OF VITAMIN A: CPT

## 2022-11-18 PROCEDURE — 94664 DEMO&/EVAL PT USE INHALER: CPT

## 2022-11-18 PROCEDURE — 88305 TISSUE EXAM BY PATHOLOGIST: CPT

## 2022-11-18 PROCEDURE — 80053 COMPREHEN METABOLIC PANEL: CPT

## 2022-11-18 PROCEDURE — 82330 ASSAY OF CALCIUM: CPT

## 2022-11-18 PROCEDURE — 86901 BLOOD TYPING SEROLOGIC RH(D): CPT

## 2022-11-18 PROCEDURE — 81025 URINE PREGNANCY TEST: CPT

## 2022-11-18 PROCEDURE — 81003 URINALYSIS AUTO W/O SCOPE: CPT

## 2022-11-18 PROCEDURE — 96365 THER/PROPH/DIAG IV INF INIT: CPT

## 2022-11-18 PROCEDURE — 78227 HEPATOBIL SYST IMAGE W/DRUG: CPT

## 2022-11-18 PROCEDURE — 74177 CT ABD & PELVIS W/CONTRAST: CPT | Mod: MA

## 2022-11-18 PROCEDURE — 85610 PROTHROMBIN TIME: CPT

## 2022-11-18 PROCEDURE — 84702 CHORIONIC GONADOTROPIN TEST: CPT

## 2022-11-18 PROCEDURE — 82525 ASSAY OF COPPER: CPT

## 2022-11-18 PROCEDURE — A9537: CPT

## 2022-11-18 PROCEDURE — 85730 THROMBOPLASTIN TIME PARTIAL: CPT

## 2022-11-18 PROCEDURE — 80048 BASIC METABOLIC PNL TOTAL CA: CPT

## 2022-11-18 PROCEDURE — 88312 SPECIAL STAINS GROUP 1: CPT

## 2022-11-18 PROCEDURE — 96366 THER/PROPH/DIAG IV INF ADDON: CPT

## 2022-11-18 PROCEDURE — 86850 RBC ANTIBODY SCREEN: CPT

## 2022-11-18 PROCEDURE — 96375 TX/PRO/DX INJ NEW DRUG ADDON: CPT

## 2022-11-18 PROCEDURE — 87635 SARS-COV-2 COVID-19 AMP PRB: CPT

## 2022-11-18 PROCEDURE — 83690 ASSAY OF LIPASE: CPT

## 2022-11-18 PROCEDURE — 85025 COMPLETE CBC W/AUTO DIFF WBC: CPT

## 2022-11-18 PROCEDURE — 84446 ASSAY OF VITAMIN E: CPT

## 2022-11-18 RX ORDER — ONDANSETRON 8 MG/1
1 TABLET, FILM COATED ORAL
Qty: 45 | Refills: 0
Start: 2022-11-18 | End: 2022-12-02

## 2022-11-18 RX ORDER — HYOSCYAMINE SULFATE 0.13 MG
1 TABLET ORAL
Qty: 84 | Refills: 0
Start: 2022-11-18 | End: 2022-12-08

## 2022-11-18 RX ORDER — ACETAMINOPHEN 500 MG
1000 TABLET ORAL ONCE
Refills: 0 | Status: COMPLETED | OUTPATIENT
Start: 2022-11-18 | End: 2022-11-18

## 2022-11-18 RX ORDER — OXYCODONE HYDROCHLORIDE 5 MG/1
1 TABLET ORAL
Qty: 10 | Refills: 0
Start: 2022-11-18 | End: 2022-11-20

## 2022-11-18 RX ORDER — TRAMADOL HYDROCHLORIDE 50 MG/1
25 TABLET ORAL EVERY 4 HOURS
Refills: 0 | Status: DISCONTINUED | OUTPATIENT
Start: 2022-11-18 | End: 2022-11-18

## 2022-11-18 RX ORDER — GABAPENTIN 400 MG/1
1 CAPSULE ORAL
Qty: 90 | Refills: 0
Start: 2022-11-18 | End: 2022-12-17

## 2022-11-18 RX ORDER — DULOXETINE HYDROCHLORIDE 30 MG/1
1 CAPSULE, DELAYED RELEASE ORAL
Qty: 30 | Refills: 0
Start: 2022-11-18 | End: 2022-12-17

## 2022-11-18 RX ADMIN — PANTOPRAZOLE SODIUM 40 MILLIGRAM(S): 20 TABLET, DELAYED RELEASE ORAL at 11:33

## 2022-11-18 RX ADMIN — Medication 0.25 MILLIGRAM(S): at 06:06

## 2022-11-18 RX ADMIN — ONDANSETRON 4 MILLIGRAM(S): 8 TABLET, FILM COATED ORAL at 10:28

## 2022-11-18 RX ADMIN — Medication 1 GRAM(S): at 06:06

## 2022-11-18 RX ADMIN — OXYCODONE HYDROCHLORIDE 5 MILLIGRAM(S): 5 TABLET ORAL at 08:44

## 2022-11-18 RX ADMIN — OXYCODONE HYDROCHLORIDE 5 MILLIGRAM(S): 5 TABLET ORAL at 08:19

## 2022-11-18 RX ADMIN — Medication 0.25 MILLIGRAM(S): at 14:11

## 2022-11-18 RX ADMIN — Medication 400 MILLIGRAM(S): at 07:41

## 2022-11-18 RX ADMIN — Medication 1000 MILLIGRAM(S): at 06:17

## 2022-11-18 RX ADMIN — DULOXETINE HYDROCHLORIDE 30 MILLIGRAM(S): 30 CAPSULE, DELAYED RELEASE ORAL at 13:05

## 2022-11-18 RX ADMIN — Medication 0.25 MILLIGRAM(S): at 10:12

## 2022-11-18 RX ADMIN — Medication 400 MILLIGRAM(S): at 14:10

## 2022-11-18 RX ADMIN — Medication 800 MILLIGRAM(S): at 02:57

## 2022-11-18 RX ADMIN — Medication 10 MILLIGRAM(S): at 13:05

## 2022-11-18 RX ADMIN — SCOPALAMINE 1 PATCH: 1 PATCH, EXTENDED RELEASE TRANSDERMAL at 07:44

## 2022-11-18 RX ADMIN — Medication 400 MILLIGRAM(S): at 06:12

## 2022-11-18 RX ADMIN — GABAPENTIN 100 MILLIGRAM(S): 400 CAPSULE ORAL at 14:11

## 2022-11-18 RX ADMIN — TRAMADOL HYDROCHLORIDE 25 MILLIGRAM(S): 50 TABLET ORAL at 12:31

## 2022-11-18 RX ADMIN — TRAMADOL HYDROCHLORIDE 25 MILLIGRAM(S): 50 TABLET ORAL at 11:52

## 2022-11-18 RX ADMIN — Medication 800 MILLIGRAM(S): at 07:44

## 2022-11-18 RX ADMIN — BUPROPION HYDROCHLORIDE 300 MILLIGRAM(S): 150 TABLET, EXTENDED RELEASE ORAL at 13:05

## 2022-11-18 RX ADMIN — Medication 400 MILLIGRAM(S): at 01:56

## 2022-11-18 RX ADMIN — Medication 0.25 MILLIGRAM(S): at 01:56

## 2022-11-18 RX ADMIN — GABAPENTIN 100 MILLIGRAM(S): 400 CAPSULE ORAL at 06:07

## 2022-11-18 RX ADMIN — Medication 800 MILLIGRAM(S): at 14:41

## 2022-11-18 NOTE — PROGRESS NOTE ADULT - NS ATTEND AMEND GEN_ALL_CORE FT
I have personally seen and examined the patient.  I fully participated in the care of this patient.  I have made amendments to the documentation where necessary, and agree with the history, physical exam, impression/assessment, and plan as documented by the PA    Patient reports severe epigastric pain only relieved with Dilaudid  Nausea   Abdomen soft, minimally tender  CT without any acute intra abdominal pathology  [facility] HIDA with good EF, no biliary dyskinesia (patient also previously found to have no gallstones)
I have personally seen and examined the patient.  I fully participated in the care of this patient.  I have made amendments to the documentation where necessary, and agree with the history, physical exam, impression/assessment, and plan as documented by the PA    Patient reports abdominal pain somewhat improved with medication changes - standing tylenol/caldolor/gabapentin and prn oxycodone  Reports nausea associated with pain but not postprandial, improves when pain improves  Upper endoscopy with Dr. Link today  Added standing levsin incase of spasm  Psych/GI f/u    Discussed with patient and RN.
I have personally seen and examined the patient.  I fully participated in the care of this patient.  I have made amendments to the documentation where necessary, and agree with the history, physical exam, impression/assessment, and plan as documented by the PA    EGD with no evidence of marginal ulcer, reflux/gastritis, etc. Normal anatomy  Pain improved  On multimodal pain control  d/c home with levsin, cymbalta, gabapentin, tylenol  f/u with GI, psychiatry  discussed with patient who verbalized understanding

## 2022-11-18 NOTE — DISCHARGE NOTE PROVIDER - CARE PROVIDER_API CALL
Prudence Mehta)  Surgery; Surgical Critical Care  [address] [place] [address]  Phone: [phone]  Fax: [phone]  Follow Up Time:

## 2022-11-18 NOTE — DISCHARGE NOTE PROVIDER - HOSPITAL COURSE
The patient is a 28y/o f who is POD#4 of ex lap with RAIZA presented to ED c/o abd pain, nausea. 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 CT of Abdomen and Pelvis was obtained IMPRESSION: Status post gastric bypass. No bowel obstruction or evidence of internal hernia.    After admission pt was transferred to the floor for medical care and pain management.     The patient had a stable and uncomplicated hospital course with no medical complications.    A course of  parenteral antibiotics was given to complete prophylaxis.  Home medications were continued during the hospital course, and adjusted if medically needed.    A medical consultation from the Hospitalist service was provided for post-operative medical co-management.  Lab work ordering/results was followed by the Medical/Surgical team.    Discharge instructions were delineated in the Discharge Plan and reviewed with the patient.  All medications were listed in the medication reconciliation document, and olvera points were reviewed with the patient.  The patient was deemed stable for discharge today (date).  Upon discharge, the patient will following up with Dr  in 2-3 weeks. The office number may be found on the discharge instructions.   The patient is a 26y/o f who is POD#4 of ex lap with RAIZA presented to ED c/o abd pain, nausea. 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 CT of Abdomen and Pelvis was obtained IMPRESSION: Status post gastric bypass. No bowel obstruction or evidence of internal hernia.    After admission pt was transferred to the floor for medical care and pain management. During the patients course the pain was 5/10 at times with no nausea/vomiting and not related to intake of food - pain was described as stabbing pressure; something in chest, located midline beneath sternum to umbilicus requiring Dilaudid to relieve pain when it became severe.  Pain caused nausea and patient was given zofran, reglan, levsyn and scopolamine for nausea as needed. GI consulted - HIDA was negative. Pt stated Dilaudid was only medication controlling pain, but still had some nausea. Patient was tolerating clears, stated pain remained in same location but not demonstrated with palpation.     After lengthy discussion with patient the pain management plan was to provide continuous coverage with ofirmiv and caldolor and stop Dilaudid prn unless nursing first discussed with Dr. Mehta.  Oral oxycodone was offered, and switched to tramadol at patients request, when it did not seem to help manage pain. GI endoscopy showed mild inflammation in RYGB pouch with no ulcers or narrowing - pristine pouch. Gas present was deemed likely motility related.    The patient had no other medical complications.    Home medications were continued during the hospital course, and adjusted if medically needed.    Medical consultation from the Hospitalist service was provided for medical co-management, as was GI consultation.  Lab work ordering/results was followed by the Medical/Surgical team.    Discharge instructions were delineated in the Discharge Plan and reviewed with the patient.  All medications were listed in the medication reconciliation document, and olvera points were reviewed with the patient.  The patient was deemed stable for discharge today (date).  Upon discharge, the patient will following up with Dr Mehta in 2-3 weeks. The office number may be found on the discharge instructions.   The patient is a 26y/o f who is POD#4 of ex lap with RAIZA presented to ED c/o abd pain, nausea. 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 CT of Abdomen and Pelvis was obtained IMPRESSION: Status post gastric bypass. No bowel obstruction or evidence of internal hernia.    After admission pt was transferred to the floor for medical care and pain management. During the patients course the pain was 5/10 at times with no nausea/vomiting and not related to intake of food - pain was described as stabbing pressure; something in chest, located midline beneath sternum to umbilicus requiring Dilaudid to relieve pain when it became severe.  Pain caused nausea and patient was given zofran, reglan, levsyn and scopolamine for nausea as needed. GI consulted - HIDA was negative. Pt stated Dilaudid was only medication controlling pain, but still had some nausea. Patient was tolerating clears, stated pain remained in same location but not demonstrated with palpation.     After lengthy discussion with patient the pain management plan was to provide continuous coverage with ofirmiv and caldolor and stop Dilaudid prn unless nursing first discussed with Dr. Mehta.  Oral oxycodone was offered, and switched to tramadol at patients request, when it did not seem to help manage pain. GI endoscopy showed mild inflammation in RYGB pouch with no ulcers or narrowing - pristine pouch. Gas present was deemed likely motility related.    The patient had no other medical complications.    Home medications were continued during the hospital course, and adjusted if medically needed.    Medical consultation from the Hospitalist service was provided for medical co-management, as was GI consultation.  Lab work ordering/results was followed by the Medical/Surgical team.    Discharge instructions were delineated in the Discharge Plan and reviewed with the patient.  All medications were listed in the medication reconciliation document, and olvera points were reviewed with the patient.  The patient was deemed stable for discharge today 11/18/22.  Upon discharge, the patient will following up with Dr Mehta in 2-3 weeks. The office number may be found on the discharge instructions.

## 2022-11-18 NOTE — DISCHARGE NOTE PROVIDER - NSDCFUADDINST_GEN_ALL_CORE_FT
Follow all verbal and written instructions. Take medications as prescribed. DO NOT drive, operate machinery, and/or make important decisions while on prescription pain medication. DO NOT hesitate to call Doctor's office with questions or concerns. Certain prescription pain medication can cause constipation; take a stool softener such as Colace 100mg 3 x a day, to avoid the constipating effects of prescription pain medication.   Follow all verbal and written instructions. Take medications as prescribed. DO NOT drive, operate machinery, and/or make important decisions while on prescription pain medication. DO NOT hesitate to call Doctor's office with questions or concerns. Certain prescription pain medication can cause constipation; take a stool softener such as Colace 100mg 3 x a day, to avoid the constipating effects of prescription pain medication.  For SEVERE pain:  Tramadol  For MODERATE pain:  Tylenol 500mg 2 tabs (1000mg total) every 6 hours. Ibuprofen 200mg 2 tabs (400mg total) every 6 hours      Alternate taking medication every 3 hours - Take Tylenol 1,000 mg first, then 3 hours later take Ibuprofen 400mg.  Then 3 hours after the Ibuprofen, take Tylenol, and so on.  The Tylenol dose is 6 hours apart from the next Tylenol dose..... The Ibuprofen is 6 hours apart from the next Ibuprofen dose, but the Tylenol and the Ibuprofen are taken three hours apart from each other.  Take for 5 days - stop if causing upset stomach and call Dr. Mehta's office.   Call Dr. Mehta's office [phone] to make an appointment to be seen in 2 weeks

## 2022-11-18 NOTE — PROGRESS NOTE ADULT - ASSESSMENT
constipation  gerd  abd pain  s/p estee    plan   upper gastrointestinal endoscopy in am  ppi and carafate  bowel regimen  adv tp clears  d/w surgical team  pain management  barron;maral mckinney as outpt  to follow up closely    Advanced care planning was discussed with patient and family.  Advanced care planning forms were reviewed and discussed.  Risks, benefits and alternatives of gastroenterologic procedures were discussed in detail and all questions were answered.    30 minutes spent.  
constipation  gerd  abd pain  s/p estee    plan   upper gastrointestinal endoscopy neg  add dicyclomine regimen  will need bowel regimen  d/c planning  ppi and carafate  bowel regimen  adv tp clears  d/w surgical team  pain management  gen mckinney as outpt  to follow up closely    Advanced care planning was discussed with patient and family.  Advanced care planning forms were reviewed and discussed.  Risks, benefits and alternatives of gastroenterologic procedures were discussed in detail and all questions were answered.    30 minutes spent.

## 2022-11-18 NOTE — PROGRESS NOTE ADULT - SUBJECTIVE AND OBJECTIVE BOX
LM advising patient, return number provided in event of questions.    Surgery Progress Note PA  27 year old female POD #8 s/p ex laparoscopy and RAIZA. Pt seen and examined at the bedside.  Patient states she feels a little nauseated today. She is tolerating liquids, and urinating well.  Pt states she has been waking up at 2-3 am in pain.    SUBJECTIVE:   Patient seen at bedside, patient asked if she could try tramadol for pain control.   Patient admits to flatus, no bowel movement this am.   Patient denies any headaches, chest pain, shortness of breath, vomiting, fever, chills, weakness, dysuria  Patient A+Ox3 in NAD at time of visit.    OBJECTIVE:   T(C): 36.9 (11-18-22 @ 07:53), Max: 37.1 (11-17-22 @ 19:45)  HR: 100 (11-18-22 @ 07:53) (57 - 100)  BP: 149/100 (11-18-22 @ 07:53) (112/59 - 162/90)  RR: 18 (11-18-22 @ 07:53) (16 - 20)  SpO2: 98% (11-18-22 @ 07:53) (96% - 100%)  CAPILLARY BLOOD GLUCOSE    I&O's Detail    17 Nov 2022 07:01  -  18 Nov 2022 07:00  --------------------------------------------------------  IN:    IV PiggyBack: 200 mL    Lactated Ringers: 625 mL    Oral Fluid: 25 mL  Total IN: 850 mL    OUT:  Total OUT: 0 mL    Total NET: 850 mL          Physical exam:  General: A+O x 3 in NAD  HEENT: PERRLA, EOM intact  Neck: trachea midline  Chest: Clear through auscultation bilaterally, No rales, rhonchi wheezes noted bilaterally  Heart: S1,S1 RRR, no murmurs noted  Abdomen: soft non distended, non tender, non tympanic, BS x 4, no guarding noted  Incision: intact, no erythema noted  Extremities: no edema noted, warm,  no calf tenderness     MEDICATIONS  (STANDING):  buPROPion XL (24-Hour) . 300 milliGRAM(s) Oral daily  cyanocobalamin 1000 MICROGram(s) Oral daily  DULoxetine 30 milliGRAM(s) Oral daily  enoxaparin Injectable 40 milliGRAM(s) SubCutaneous every 24 hours  gabapentin 100 milliGRAM(s) Oral every 8 hours  hyoscyamine SL 0.25 milliGRAM(s) SubLingual every 4 hours  ibuprofen IVPB .. 800 milliGRAM(s) IV Intermittent every 6 hours  lactulose Syrup 30 Gram(s) Oral once  multivitamin 1 Tablet(s) Oral daily  pantoprazole  Injectable 40 milliGRAM(s) IV Push daily  senna 2 Tablet(s) Oral at bedtime  sucralfate suspension 1 Gram(s) Oral two times a day  thiamine 100 milliGRAM(s) Oral daily    MEDICATIONS  (PRN):  aluminum hydroxide/magnesium hydroxide/simethicone Suspension 30 milliLiter(s) Oral every 4 hours PRN Dyspepsia  lactulose Syrup 20 Gram(s) Oral daily PRN constipartion  ondansetron Injectable 4 milliGRAM(s) IV Push every 6 hours PRN Nausea and/or Vomiting  oxyCODONE    IR 5 milliGRAM(s) Oral every 4 hours PRN Severe Pain (7 - 10)  simethicone 80 milliGRAM(s) Chew every 8 hours PRN Gas      LABS:                        12.1   7.89  )-----------( 313      ( 18 Nov 2022 06:00 )             34.8     11-18    134<L>  |  97  |  6<L>  ----------------------------<  78  4.0   |  27  |  0.71    Ca    8.6      18 Nov 2022 06:00            RADIOLOGY & ADDITIONAL STUDIES:    Assessment  Patient is a 27y old  Female who presents with a chief complaint of Abd pain s/p ex lap 7 days ago (17 Nov 2022 08:33)      Plan      Surgical Team Contact Information     Surgery Progress Note PA  27 year old female POD #8 s/p ex laparoscopy and RAIZA. Pt seen and examined at the bedside.  Patient states she feels a little nauseated today. She is tolerating liquids, and urinating well.  Pt states she has been waking up at 2-3 am in pain.    SUBJECTIVE:   Patient seen at bedside, patient asked if she could try tramadol for pain control.   Patient admits to flatus, no bowel movement this am.   Patient denies any headaches, chest pain, shortness of breath, vomiting, fever, chills, weakness, dysuria  Patient A+Ox3 in NAD at time of visit.    OBJECTIVE:   T(C): 36.9 (11-18-22 @ 07:53), Max: 37.1 (11-17-22 @ 19:45)  HR: 100 (11-18-22 @ 07:53) (57 - 100)  BP: 149/100 (11-18-22 @ 07:53) (112/59 - 162/90)  RR: 18 (11-18-22 @ 07:53) (16 - 20)  SpO2: 98% (11-18-22 @ 07:53) (96% - 100%)  CAPILLARY BLOOD GLUCOSE    I&O's Detail    17 Nov 2022 07:01  -  18 Nov 2022 07:00  --------------------------------------------------------  IN:    IV PiggyBack: 200 mL    Lactated Ringers: 625 mL    Oral Fluid: 25 mL  Total IN: 850 mL    OUT:  Total OUT: 0 mL    Total NET: 850 mL          Physical exam:  General: A+O x 3 in NAD  HEENT: PERRLA, EOM intact  Neck: trachea midline  Chest: Clear through auscultation bilaterally, No rales, rhonchi wheezes noted bilaterally  Heart: S1,S1 RRR, no murmurs noted  Abdomen: soft non distended, non tender, non tympanic, BS x 4, no guarding noted  Incision: intact, no erythema noted  Extremities: no edema noted, warm,  no calf tenderness     MEDICATIONS  (STANDING):  buPROPion XL (24-Hour) . 300 milliGRAM(s) Oral daily  cyanocobalamin 1000 MICROGram(s) Oral daily  DULoxetine 30 milliGRAM(s) Oral daily  enoxaparin Injectable 40 milliGRAM(s) SubCutaneous every 24 hours  gabapentin 100 milliGRAM(s) Oral every 8 hours  hyoscyamine SL 0.25 milliGRAM(s) SubLingual every 4 hours  ibuprofen IVPB .. 800 milliGRAM(s) IV Intermittent every 6 hours  lactulose Syrup 30 Gram(s) Oral once  multivitamin 1 Tablet(s) Oral daily  pantoprazole  Injectable 40 milliGRAM(s) IV Push daily  senna 2 Tablet(s) Oral at bedtime  sucralfate suspension 1 Gram(s) Oral two times a day  thiamine 100 milliGRAM(s) Oral daily    MEDICATIONS  (PRN):  aluminum hydroxide/magnesium hydroxide/simethicone Suspension 30 milliLiter(s) Oral every 4 hours PRN Dyspepsia  lactulose Syrup 20 Gram(s) Oral daily PRN constipartion  ondansetron Injectable 4 milliGRAM(s) IV Push every 6 hours PRN Nausea and/or Vomiting  oxyCODONE    IR 5 milliGRAM(s) Oral every 4 hours PRN Severe Pain (7 - 10)  simethicone 80 milliGRAM(s) Chew every 8 hours PRN Gas      LABS:                        12.1   7.89  )-----------( 313      ( 18 Nov 2022 06:00 )             34.8     11-18    134<L>  |  97  |  6<L>  ----------------------------<  78  4.0   |  27  |  0.71    Ca    8.6      18 Nov 2022 06:00            RADIOLOGY & ADDITIONAL STUDIES:    Assessment/Plan  Patient is a 28y/o female who presented with a chief complaint of Abd pain s/p ex lap 8 days ago of  laparoscopy and RAIZA, history of RYGB.   Readmitted with abdominal pain likely post surgical  CT images reviewed no intra-abdominal pathology noted that can explain her symptoms  [facility] HIDA demonstrates normal EF, no evidence of biliary dyskinesia  multimodal pain control switched from Oxy to Tramadol for breakthrough severe pain - No Dilaudid  vte ppx  protonix and carafate  zofran and levsin and scop patches for nausea  IVF  pending H Pylori antigen stool test  GI s/p  upper gastrointestinal endoscopy mild inflammation in the rygb pouch no ulcers or narrowing pristine pouch  Alternative therapy/behavioral health recs appreciated       Surgery Progress Note PA  27 year old female POD #8 s/p ex laparoscopy and RAIZA. Pt seen and examined at the bedside.  Patient states she feels a little nauseated today. She is tolerating liquids, and urinating well.  Pt states she has been waking up at 2-3 am in pain.    SUBJECTIVE:   Patient seen at bedside, patient asked if she could try tramadol for pain control.   Patient admits to flatus, no bowel movement this am.   Patient denies any headaches, chest pain, shortness of breath, vomiting, fever, chills, weakness, dysuria  Patient A+Ox3 in NAD at time of visit.    OBJECTIVE:   T(C): 36.9 (11-18-22 @ 07:53), Max: 37.1 (11-17-22 @ 19:45)  HR: 100 (11-18-22 @ 07:53) (57 - 100)  BP: 149/100 (11-18-22 @ 07:53) (112/59 - 162/90)  RR: 18 (11-18-22 @ 07:53) (16 - 20)  SpO2: 98% (11-18-22 @ 07:53) (96% - 100%)  CAPILLARY BLOOD GLUCOSE    I&O's Detail    17 Nov 2022 07:01  -  18 Nov 2022 07:00  --------------------------------------------------------  IN:    IV PiggyBack: 200 mL    Lactated Ringers: 625 mL    Oral Fluid: 25 mL  Total IN: 850 mL    OUT:  Total OUT: 0 mL    Total NET: 850 mL          Physical exam:  General: A+O x 3 in NAD  HEENT: PERRLA, EOM intact  Neck: trachea midline  Chest: Clear through auscultation bilaterally, No rales, rhonchi wheezes noted bilaterally  Heart: S1,S1 RRR, no murmurs noted  Abdomen: soft non distended, non tender, non tympanic, BS x 4, no guarding noted  Incision: intact, no erythema noted  Extremities: no edema noted, warm,  no calf tenderness     MEDICATIONS  (STANDING):  buPROPion XL (24-Hour) . 300 milliGRAM(s) Oral daily  cyanocobalamin 1000 MICROGram(s) Oral daily  DULoxetine 30 milliGRAM(s) Oral daily  enoxaparin Injectable 40 milliGRAM(s) SubCutaneous every 24 hours  gabapentin 100 milliGRAM(s) Oral every 8 hours  hyoscyamine SL 0.25 milliGRAM(s) SubLingual every 4 hours  ibuprofen IVPB .. 800 milliGRAM(s) IV Intermittent every 6 hours  lactulose Syrup 30 Gram(s) Oral once  multivitamin 1 Tablet(s) Oral daily  pantoprazole  Injectable 40 milliGRAM(s) IV Push daily  senna 2 Tablet(s) Oral at bedtime  sucralfate suspension 1 Gram(s) Oral two times a day  thiamine 100 milliGRAM(s) Oral daily    MEDICATIONS  (PRN):  aluminum hydroxide/magnesium hydroxide/simethicone Suspension 30 milliLiter(s) Oral every 4 hours PRN Dyspepsia  lactulose Syrup 20 Gram(s) Oral daily PRN constipartion  ondansetron Injectable 4 milliGRAM(s) IV Push every 6 hours PRN Nausea and/or Vomiting  oxyCODONE    IR 5 milliGRAM(s) Oral every 4 hours PRN Severe Pain (7 - 10)  simethicone 80 milliGRAM(s) Chew every 8 hours PRN Gas      LABS:                        12.1   7.89  )-----------( 313      ( 18 Nov 2022 06:00 )             34.8     11-18    134<L>  |  97  |  6<L>  ----------------------------<  78  4.0   |  27  |  0.71    Ca    8.6      18 Nov 2022 06:00            RADIOLOGY & ADDITIONAL STUDIES:    Assessment/Plan  Patient is a 26y/o female who presented with a chief complaint of Abd pain s/p ex lap 8 days ago of  laparoscopy and RAIZA, history of RYGB.   Readmitted with abdominal pain likely post surgical  CT images reviewed no intra-abdominal pathology noted that can explain her symptoms  [facility] HIDA demonstrates normal EF, no evidence of biliary dyskinesia  multimodal pain control switched from Oxy to Tramadol for breakthrough severe pain - No Dilaudid  vte ppx  protonix and carafate  zofran and levsin and scop patches for nausea  IVF  pending H Pylori antigen stool test  GI s/p  upper gastrointestinal endoscopy mild inflammation in the rygb pouch no ulcers or narrowing pristine pouch  Alternative therapy/behavioral health recs appreciated  Discussed with Dr. Mehta - will round together later

## 2022-11-18 NOTE — DISCHARGE NOTE NURSING/CASE MANAGEMENT/SOCIAL WORK - PATIENT PORTAL LINK FT
You can access the FollowMyHealth Patient Portal offered by [facility] by registering at the following website: [email or web address]. By joining [facility] FollowMyHealth portal, you will also be able to view your health information using other applications (apps) compatible with our system.

## 2022-11-18 NOTE — DISCHARGE NOTE PROVIDER - NSDCMRMEDTOKEN_GEN_ALL_CORE_FT
Blisovi 24 FE oral tablet: 1 tab(s) orally once a day  Linzess 290 mcg oral capsule: 1 cap(s) orally once a day  oxyCODONE 5 mg oral tablet: 1 tab(s) orally every 6 hours, As Needed -for severe pain MDD:4   Protonix 40 mg oral delayed release tablet: 1 tab(s) orally once a day  Strattera 100 mg oral capsule: 1 cap(s) orally once a day (in the morning)  Wellbutrin  mg/24 hours oral tablet, extended release: 1 tab(s) orally every 24 hours   Blisovi 24 FE oral tablet: 1 tab(s) orally once a day  Levsin 0.125 mg oral tablet: 1 tab(s) orally 4 times a day MDD:4   Take daily  Linzess 290 mcg oral capsule: 1 cap(s) orally once a day  ondansetron 4 mg oral tablet, disintegratin tab(s) orally 3 times a day, As Needed -for nausea MDD:3   oxyCODONE 5 mg oral tablet: 1 tab(s) orally every 6 hours, As Needed -for severe pain MDD:4   Protonix 40 mg oral delayed release tablet: 1 tab(s) orally once a day  Strattera 100 mg oral capsule: 1 cap(s) orally once a day (in the morning)  Wellbutrin  mg/24 hours oral tablet, extended release: 1 tab(s) orally every 24 hours   Blisovi 24 FE oral tablet: 1 tab(s) orally once a day  Cymbalta 30 mg oral delayed release capsule: 1 cap(s) orally once a day MDD:1  gabapentin 100 mg oral capsule: 1 cap(s) orally every 8 hours MDD:3   Levsin 0.125 mg oral tablet: 1 tab(s) orally 4 times a day MDD:4   Take daily  Linzess 290 mcg oral capsule: 1 cap(s) orally once a day  ondansetron 4 mg oral tablet, disintegratin tab(s) orally 3 times a day, As Needed -for nausea MDD:3   oxyCODONE 5 mg oral tablet: 1 tab(s) orally every 6 hours, As Needed -for severe pain MDD:4   Protonix 40 mg oral delayed release tablet: 1 tab(s) orally once a day  Strattera 100 mg oral capsule: 1 cap(s) orally once a day (in the morning)  Wellbutrin  mg/24 hours oral tablet, extended release: 1 tab(s) orally every 24 hours

## 2022-11-18 NOTE — DISCHARGE NOTE NURSING/CASE MANAGEMENT/SOCIAL WORK - NSDCPEFALRISK_GEN_ALL_CORE
For information on Fall & Injury Prevention, visit: [email or web address] OR  [email or web address] OR  https://www.cdc.gov/steadi/patient.html

## 2022-11-18 NOTE — PROGRESS NOTE ADULT - SUBJECTIVE AND OBJECTIVE BOX
INTERVAL HPI/OVERNIGHT EVENTS:  tolerating po s/p  upper gastrointestinal endoscopy neg  some gas likely motility related pain    Allergies    penicillins (Unknown)    Intolerances      General:  No wt loss, fevers, chills, night sweats, fatigue,   Eyes:  Good vision, no reported pain  ENT:  No sore throat, pain, runny nose, dysphagia  CV:  No pain, palpitations, hypo/hypertension  Resp:  No dyspnea, cough, tachypnea, wheezing  GI:  No pain, No nausea, No vomiting, No diarrhea, No constipation, No weight loss, No fever, No pruritis, No rectal bleeding, No tarry stools, No dysphagia,  :  No pain, bleeding, incontinence, nocturia  Muscle:  No pain, weakness  Neuro:  No weakness, tingling, memory problems  Psych:  No fatigue, insomnia, mood problems, depression  Endocrine:  No polyuria, polydipsia, cold/heat intolerance  Heme:  No petechiae, ecchymosis, easy bruisability  Skin:  No rash, tattoos, scars, edema      PHYSICAL EXAM:   Vital Signs:  Vital Signs Last 24 Hrs  T(C): 36.9 (18 Nov 2022 07:53), Max: 37.1 (17 Nov 2022 19:45)  T(F): 98.4 (18 Nov 2022 07:53), Max: 98.7 (17 Nov 2022 19:45)  HR: 100 (18 Nov 2022 07:53) (57 - 100)  BP: 149/100 (18 Nov 2022 07:53) (112/59 - 162/90)  BP(mean): --  RR: 18 (18 Nov 2022 07:53) (16 - 20)  SpO2: 98% (18 Nov 2022 07:53) (96% - 100%)    Parameters below as of 18 Nov 2022 07:53  Patient On (Oxygen Delivery Method): room air      Daily     Daily I&O's Summary    17 Nov 2022 07:01  -  18 Nov 2022 07:00  --------------------------------------------------------  IN: 850 mL / OUT: 0 mL / NET: 850 mL        GENERAL:  Appears stated age, well-groomed, well-nourished, no distress  HEENT:  NC/AT,  conjunctivae clear and pink, no thyromegaly, nodules, adenopathy, no JVD, sclera -anicteric  CHEST:  Full & symmetric excursion, no increased effort, breath sounds clear  HEART:  Regular rhythm, S1, S2, no murmur/rub/S3/S4, no abdominal bruit, no edema  ABDOMEN:  Soft, non-tender, non-distended, normoactive bowel sounds,  no masses ,no hepato-splenomegaly, no signs of chronic liver disease  EXTEREMITIES:  no cyanosis,clubbing or edema  SKIN:  No rash/erythema/ecchymoses/petechiae/wounds/abscess/warm/dry  NEURO:  Alert, oriented, no asterixis, no tremor, no encephalopathy      LABS:                        12.1   7.89  )-----------( 313      ( 18 Nov 2022 06:00 )             34.8     11-18    134<L>  |  97  |  6<L>  ----------------------------<  78  4.0   |  27  |  0.71    Ca    8.6      18 Nov 2022 06:00          amylase   lipaseLipase, Serum: 142 U/L (11-13 @ 16:55)    RADIOLOGY & ADDITIONAL TESTS:

## 2022-11-18 NOTE — PROGRESS NOTE ADULT - REASON FOR ADMISSION
Abd pain s/p ex lap 4 days ago
Abd pain s/p ex lap 5 days ago
Abd pain s/p ex lap 5 days ago
Abd pain s/p ex lap 7 days ago
Abd pain s/p ex lap 4 days ago
Abd pain s/p ex lap 4 days ago

## 2022-11-20 LAB
COPPER SERPL-MCNC: 133 UG/DL — SIGNIFICANT CHANGE UP (ref 80–158)
ZINC SERPL-MCNC: 66 UG/DL — SIGNIFICANT CHANGE UP (ref 44–115)

## 2022-11-21 LAB — VIT B1 SERPL-MCNC: 109.5 NMOL/L — SIGNIFICANT CHANGE UP (ref 66.5–200)

## 2022-11-23 LAB
A-TOCOPHEROL VIT E SERPL-MCNC: 10.8 MG/L — SIGNIFICANT CHANGE UP (ref 5.9–19.4)
BETA+GAMMA TOCOPHEROL SERPL-MCNC: 0.7 MG/L — SIGNIFICANT CHANGE UP (ref 0.7–4.9)
VIT A SERPL-MCNC: 40.5 UG/DL — SIGNIFICANT CHANGE UP (ref 18.9–57.3)

## 2022-11-26 LAB — MENADIONE SERPL-MCNC: <0.1 NG/ML — LOW (ref 0.1–2.2)

## 2022-12-02 ENCOUNTER — NON-APPOINTMENT (OUTPATIENT)
Age: 27
End: 2022-12-02

## 2022-12-02 ENCOUNTER — APPOINTMENT (OUTPATIENT)
Dept: BARIATRICS | Facility: CLINIC | Age: 27
End: 2022-12-02

## 2022-12-02 VITALS
OXYGEN SATURATION: 99 % | BODY MASS INDEX: 23.32 KG/M2 | TEMPERATURE: 96.7 F | HEIGHT: 67 IN | WEIGHT: 148.59 LBS | HEART RATE: 102 BPM | SYSTOLIC BLOOD PRESSURE: 120 MMHG | DIASTOLIC BLOOD PRESSURE: 78 MMHG

## 2022-12-02 PROCEDURE — 99024 POSTOP FOLLOW-UP VISIT: CPT

## 2022-12-02 RX ORDER — LINACLOTIDE 145 UG/1
145 CAPSULE, GELATIN COATED ORAL
Qty: 90 | Refills: 3 | Status: DISCONTINUED | COMMUNITY
Start: 2022-09-28 | End: 2022-12-02

## 2022-12-02 RX ORDER — ACYCLOVIR 50 MG/G
5 OINTMENT TOPICAL 3 TIMES DAILY
Qty: 1 | Refills: 0 | Status: DISCONTINUED | COMMUNITY
Start: 2021-11-08 | End: 2022-12-02

## 2022-12-02 NOTE — HISTORY OF PRESENT ILLNESS
[de-identified] : 27-year-old female with a history of Brittany-en-Y gastric bypass now status post diagnostic laparoscopy and extensive lysis of adhesion with mobilization of the splenic flexure for partial large bowel obstruction.  Patient was readmitted postoperatively for new worsening epigastric pain.  Repeat endoscopy with biopsies resulted with H. pylori.\par Epigastric pain is improving. Has been having increased/regular BMs after being started on medication by Dr. Link.\par Reports decreased appetite, feels full quickly, has lost some weight in past few weeks.  \par Started on medication for H. pylori 2 days ago.\par Following up with Dr. Link for constipation and H pylori.

## 2022-12-02 NOTE — PHYSICAL EXAM
[Normal] : affect appropriate [de-identified] : Anicteric, no conjunctival injection [de-identified] : Supple [de-identified] : Equal chest rise, nonlabored respirations. No audible wheezing. [de-identified] : Regular rate and rhythm. [de-identified] : Soft, nondistended, nontender, well-healing laparoscopic incisions

## 2022-12-02 NOTE — ASSESSMENT
[FreeTextEntry1] : 27-year-old female status post diagnostic laparoscopy with extensive lysis of adhesions and mobilization of the splenic flexure for partial large bowel obstruction, with a history of Brittany-en-Y gastric bypass.  Patient now with epigastric pain which is starting to improve, may be related to H. pylori infection and gastritis.\par - h pylori treatment per GI\par -f/u with Dr. Link as scheduled\par -call with questions\par -Follow-up in 6 to 8 weeks to reevaluate epigastric pain and after treatment for H. pylori

## 2022-12-06 ENCOUNTER — APPOINTMENT (OUTPATIENT)
Dept: PSYCHIATRY | Facility: CLINIC | Age: 27
End: 2022-12-06

## 2022-12-06 PROCEDURE — 99215 OFFICE O/P EST HI 40 MIN: CPT

## 2022-12-06 RX ORDER — BUPROPION HYDROCHLORIDE 300 MG/1
300 TABLET, EXTENDED RELEASE ORAL DAILY
Qty: 90 | Refills: 0 | Status: COMPLETED | COMMUNITY
Start: 2022-05-05 | End: 2022-12-06

## 2022-12-06 NOTE — HISTORY OF PRESENT ILLNESS
[No] : no [de-identified] : Patient is a transfer from [facility] as she left the practice. Patient is currently on Strattera 100 mg, Wellbutrin 300 mg, and Trazodone 150 mg. Patient she feels fine but only problem is her sleep. \par \par Mood:less depression and anxiety is manageable\par Sleep: 4 hours broken. Forgets to take the Trazodone. Hard to go back to sleep. Went to sleep at 11:30 pm and woke up at 2 then 3 am and and 4am and was not able to go back to sleep. \par Appetite: has had abdominal surgery become of adhesion. Has a h/o bariatric surgery\par Energy: less because of lack of sleep. \par Concentration: good with Strattera\par Motivation: ok\par Denies any AVH, SI or HI.\par Last drink was 7/20/2021\par Going to 5 meetings per week and individual with Shonda Bourgeois at [facility] once a week.\par Denies any cravings, urges or relapse.\par Has tried Vivitrol for 13 months and then started having abdominal surgery so had to be taken off of it. States it did help her as she was not drinking for 13 months. \par \par \par  [de-identified] : Intake:    25 y/o  seen today for intake and initial MH evaluation. 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 Pt lives alone, her family is from [place]\par \par  \par \par Chief compliant: Establish care and medication management.\par \par  \par \par History of present illness: Pt started treatment for anxiety/depression at age 13.  She struggled with body dysmorphic disorder. Was on several medications but never been adherent to it. Was on/off medications. 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 She started to self medicate with alcohol and started to abuse it. Her wake up call was when she went to work intoxicated and was pulled aside. Pt started Rehab in July of 2021 for AUD, for a month. Pt is sober for 10 months. Attends AA meetings and has a sponsor. 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 She is looking  to switch providers because of change of insurance. \par \par \par  \par \par Depression: Pt denies sx consistent with MDD. "I have not felt depressed since I have been sober."  Sleep is 'better now' Appetite is good. She struggles with body image. Constantly feels that she is over wt. Had bariatric surgery at age 16 and has lost 100 lbs since and has been able to maintain it . Denies SI/intent or HI\par \par  \par \par Susannah/Hypomania:  Denies episodes in the recent or remote past \par \par  \par \par Anxiety:  Anxiety has improved , she use to have 'crippling anxiety' during COVID and often self medicated with alcohol Denies Panic attacks or sx consistent with OCD/PTSD \par \par  \par \par Eating Disorder: Denies any  current symptoms. Pt was an emotional eater,  used to purge after stuffing herself.  Last done was 2 years ago. \par \par  \par \par Psychosis: Denies AH/VH, does not illicit any delusional thoughts during this assessment. \par \par

## 2022-12-06 NOTE — REASON FOR VISIT
[Follow-Up Visit] : a follow-up visit [Evaluation] : evaluation of [FreeTextEntry1] : Medication management

## 2022-12-06 NOTE — SOCIAL HISTORY
[Alone] : lives alone [Employed] : employed [Never ] : never  [College] : College [None] : none [FreeTextEntry1] : Social History:  \par \par Born and raised: Born and raised in [place] [place] [facility]. \par \par Siblings: She is the older of 4 sibling, 2 are half sibling\par \par Parents:   when pt was 4 y/o . Father has his own [employer] business, mom work in a construction company. \par \par Childhood:  was traumatic. Mom has been in and out of Psychiatric hospitals. Pt and her brother lived with dad, had supervised visits with mom, had to go for court mandated therapy. . Dad remarried someone very young. Pt did not have a good relationship with step mom. When in  both pt and her brother started to live with her mother. \par \par Education:  was an average student, she was given her mom's adderall and she did had bad SE. She had focus and attention issues but was not diagnosed in . She went to college at [employer] did her Bachelors in Psychology. Started seeing her mothers psychiatrist and was Rxed Ritalin that helped with focus and attention. Did her BSN from [place]. 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 \par \par Relationship with parents: with dad  is ok now, with mom it is complicated and she tries to  keep a distance\par \par Relationship with siblings: good \par \par Romantic relationship/marriage:  \par \par  \par \par Legal Hx: Denies \par \par  \par \par Access to firearms: Denies

## 2022-12-06 NOTE — PAST MEDICAL HISTORY
[FreeTextEntry1] : Past Psyc Hx:  Pt started treatment for anxiety/depression at age 13.  She struggled with body dysmorphic disorder. Was on several medications but never been adherent to it. Was on/off medications. 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 She started to self medicate with alcohol and started to abuse it. Her wake up call was when she went to work intoxicated and was pulled aside. Pt started Rehab in 2021 for AUD, for a month. Continued to see Dr Shah and is currently on Wellbutrin, Strattera and Trazodone. She is looking  to switch providers because of change of insurance. \par \par Past Psych Hospitalization: \par \par Psychotherapy:   Seeing MITCH Merchant  every week at [facility]. \par \par Suicide attempt:  Denies \par \par SIB:  Denies\par \par  \par \par Allergies:  PCN\par \par  \par \par Major Medical Problems:   Migraine\par \par Prescription medications:  birth control and her psychotropic medications\par \par OTC medications: Advil, prilosec\par \par TBI: None \par \par Seizures: None \par \par Migraine HA:  Last week, goes to chiropractor \par \par  \par \par Surgery:  Bariatric surgery at age 16, revision done in , bowel obstruction surgery in \par \par  \par \par Substance Use History: \par \par  \par \par Nicotine: Vaping occasionally \par \par Alcohol: Sober for the last 10 months.  Started drinking in senior year of college. Her drinking got worse since the pandemic, would drink 2 bottles of wine, mix drinks.  "I was a black out drinker." \par \par CAGE Questionnaire : negative \par \par Illicit drugs: MJ last smoked was May of last year\par \par Withdrawal: n/a \par \par Hospitalizations: n/a \par \par Detox/Rehab tx:  Went to [facility] rehab in 2021 for a month\par \par  \par \par Developmental History \par \par  \par \par Pre/ problems: Unremarkable \par \par Developmental milestones: Unremarkable \par \par  \par \par  \par \par OBGYN Hx: \par \par Menstrual cycle: Menarche:  8 y/o , LMP: current  \par \par  \par \par Pregnancies/Miscarriage:  None\par \par  \par \par Menopause: n/a\par \par

## 2022-12-06 NOTE — FAMILY HISTORY
[FreeTextEntry1] : Family History: \par \par  \par \par Psychiatric: Mom has anxiety/depression, Bulimia. On Effexor, Klonopin and Lamictal\par \par Substance use:  Father with AUD, brother with [place]\par \par Suicide: Mom attempted suicide\par \par Neurological/medical: None \par \par Autoimmune disorders: None \par \par Dementia: None \par \par Cancer: None \par \par Metabolic: None \par \par Epilepsy: None \par \par Migraine: None \par \par Brain aneurysms: None \par \par Movement disorders: None \par \par Thyroid: None \par \par Cardiac: None \par \par

## 2022-12-06 NOTE — DISCUSSION/SUMMARY
[FreeTextEntry1] : Assessment: Patient is a 26 yo female with h/o depression anxiety and alcohol use disorder seen today for medication management. Patient is compliant with their medications, tolerating it well without any side effects. I-STOP was checked without any problems.\par \par \par Plan:  \par Decrease and change the formulation from Wellbutrin  to  PO QAM for depression, low motivation, energy, concentration and decrease SSRI induced sexual dysfunction. \par Continue Strattera 100 mg QD for ADHD\par Continue Trazodone 150 mg QHS ( No refill requested)\par  Alternative strategies including no intervention discussed with patient. Patient consents to current medications as prescribed.\par - Discussed with patient regarding importance of abstinence and sobriety from alcohol and drugs. Educated about relationship between worsening mood/anxiety symptoms and drug use and improvement of symptoms with abstinence. \par - Discussed about unpredictable effects including cardiorespiratory collapse from the combination of illicit drugs and prescribed medications. Patient verbalized understanding.\par - Patient understands to contact clinic prn with concerns and agrees to call 911 or go to nearest ER if symptoms worsen.\par - Next appointment made in 1 month. Patient left the office without any distress.\par \par \par \par \par \par

## 2022-12-21 ENCOUNTER — APPOINTMENT (OUTPATIENT)
Dept: PSYCHIATRY | Facility: CLINIC | Age: 27
End: 2022-12-21

## 2022-12-21 PROCEDURE — 99214 OFFICE O/P EST MOD 30 MIN: CPT | Mod: 95

## 2022-12-21 NOTE — DISCUSSION/SUMMARY
[FreeTextEntry1] : Assessment: Patient is a 26 yo female with h/o depression anxiety and alcohol use disorder seen today for medication management. Patient is compliant with their medications, tolerating it well without any side effects. I-STOP was checked without any problems.\par \par \par Plan:  \par Continue Wellbutrin  PO QAM for depression, low motivation, energy, concentration and decrease SSRI induced sexual dysfunction. \par Continue Strattera 100 mg QD for ADHD\par D/C Trazodone 150 mg QHS ( No refill requested)\par  Alternative strategies including no intervention discussed with patient. Patient consents to current medications as prescribed.\par - Discussed with patient regarding importance of abstinence and sobriety from alcohol and drugs. Educated about relationship between worsening mood/anxiety symptoms and drug use and improvement of symptoms with abstinence. \par - Discussed about unpredictable effects including cardiorespiratory collapse from the combination of illicit drugs and prescribed medications. Patient verbalized understanding.\par - Patient understands to contact clinic prn with concerns and agrees to call 911 or go to nearest ER if symptoms worsen.\par - Next appointment made in 1 month. Patient left the office without any distress.\par \par \par \par \par \par

## 2022-12-21 NOTE — PAST MEDICAL HISTORY
[FreeTextEntry1] : Past Psyc Hx:  Pt started treatment for anxiety/depression at age 13.  She struggled with body dysmorphic disorder. Was on several medications but never been adherent to it. Was on/off medications. 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 She started to self medicate with alcohol and started to abuse it. Her wake up call was when she went to work intoxicated and was pulled aside. Pt started Rehab in 2021 for AUD, for a month. Continued to see Dr Shah and is currently on Wellbutrin, Strattera and Trazodone. She is looking  to switch providers because of change of insurance. \par \par Past Psych Hospitalization: \par \par Psychotherapy:   Seeing MITCH Merchant  every week at [facility]. \par \par Suicide attempt:  Denies \par \par SIB:  Denies\par \par  \par \par Allergies:  PCN\par \par  \par \par Major Medical Problems:   Migraine\par \par Prescription medications:  birth control and her psychotropic medications\par \par OTC medications: Advil, prilosec\par \par TBI: None \par \par Seizures: None \par \par Migraine HA:  Last week, goes to chiropractor \par \par  \par \par Surgery:  Bariatric surgery at age 16, revision done in , bowel obstruction surgery in \par \par  \par \par Substance Use History: \par \par  \par \par Nicotine: Vaping occasionally \par \par Alcohol: Sober for the last 10 months.  Started drinking in senior year of college. Her drinking got worse since the pandemic, would drink 2 bottles of wine, mix drinks.  "I was a black out drinker." \par \par CAGE Questionnaire : negative \par \par Illicit drugs: MJ last smoked was May of last year\par \par Withdrawal: n/a \par \par Hospitalizations: n/a \par \par Detox/Rehab tx:  Went to [facility] rehab in 2021 for a month\par \par  \par \par Developmental History \par \par  \par \par Pre/ problems: Unremarkable \par \par Developmental milestones: Unremarkable \par \par  \par \par  \par \par OBGYN Hx: \par \par Menstrual cycle: Menarche:  8 y/o , LMP: current  \par \par  \par \par Pregnancies/Miscarriage:  None\par \par  \par \par Menopause: n/a\par \par

## 2022-12-21 NOTE — SOCIAL HISTORY
[Alone] : lives alone [Employed] : employed [Never ] : never  [College] : College [None] : none [FreeTextEntry1] : Social History:  \par \par Born and raised: Born and raised in [facility]. \par \par Siblings: She is the older of 4 sibling, 2 are half sibling\par \par Parents:   when pt was 2 y/o . Father has his own [employer] business, mom work in a construction company. \par \par Childhood:  was traumatic. Mom has been in and out of Psychiatric hospitals. Pt and her brother lived with dad, had supervised visits with mom, had to go for court mandated therapy. . Dad remarried someone very young. Pt did not have a good relationship with step mom. When in  both pt and her brother started to live with her mother. \par \par Education:  was an average student, she was given her mom's adderall and she did had bad SE. She had focus and attention issues but was not diagnosed in . She went to college at [employer] did her Bachelors in Psychology. Started seeing her mothers psychiatrist and was Rxed Ritalin that helped with focus and attention. Did her BSN from [place]. 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 \par \par Relationship with parents: with dad  is ok now, with mom it is complicated and she tries to  keep a distance\par \par Relationship with siblings: good \par \par Romantic relationship/marriage:  \par \par  \par \par Legal Hx: Denies \par \par  \par \par Access to firearms: Denies

## 2022-12-21 NOTE — HISTORY OF PRESENT ILLNESS
[Home] : at home, [unfilled] , at the time of the visit. [Medical Office: ([facility])___] : at the medical office located in  [Verbal consent obtained from patient] : the patient, [unfilled] [de-identified] : Last month we decreased and changed the formulation from Wellbutrin  to . Patient states she likes the medication. Sleep is better for he and she feels better. \par \par Mood:less depression and anxiety is manageable\par Sleep: is better 6 hours. No Trazodone\par Appetite: better. Has been eating\par Energy: not great. NO workout. Some days are tired than others. \par Concentration: good with Strattera\par Motivation: OK\par Denies any AVH, SI or HI.\par Going to work in the first week of New Years. Has been off from work for 5 weeks. \par Last drink was 7/20/2021\par Going to 5 meetings per week and individual with Shonda Bourgeois at [facility] once a week. +Sponsor \par Denies any cravings, urges or relapse.\par No Vivitrol. No cravings, urge relapse.  [No] : no [de-identified] : Intake:    25 y/o  seen today for intake and initial MH evaluation. 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 Pt lives alone, her family is from [place]\par \par  \par \par Chief compliant: Establish care and medication management.\par \par  \par \par History of present illness: Pt started treatment for anxiety/depression at age 13.  She struggled with body dysmorphic disorder. Was on several medications but never been adherent to it. Was on/off medications. 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 She started to self medicate with alcohol and started to abuse it. Her wake up call was when she went to work intoxicated and was pulled aside. Pt started Rehab in July of 2021 for AUD, for a month. Pt is sober for 10 months. Attends AA meetings and has a sponsor. 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 She is looking  to switch providers because of change of insurance. \par \par \par  \par \par Depression: Pt denies sx consistent with MDD. "I have not felt depressed since I have been sober."  Sleep is 'better now' Appetite is good. She struggles with body image. Constantly feels that she is over wt. Had bariatric surgery at age 16 and has lost 100 lbs since and has been able to maintain it . Denies SI/intent or HI\par \par  \par \par Susannah/Hypomania:  Denies episodes in the recent or remote past \par \par  \par \par Anxiety:  Anxiety has improved , she use to have 'crippling anxiety' during COVID and often self medicated with alcohol Denies Panic attacks or sx consistent with OCD/PTSD \par \par  \par \par Eating Disorder: Denies any  current symptoms. Pt was an emotional eater,  used to purge after stuffing herself.  Last done was 2 years ago. \par \par  \par \par Psychosis: Denies AH/VH, does not illicit any delusional thoughts during this assessment. \par \par

## 2023-01-03 ENCOUNTER — APPOINTMENT (OUTPATIENT)
Dept: BARIATRICS | Facility: CLINIC | Age: 28
End: 2023-01-03
Payer: COMMERCIAL

## 2023-01-03 VITALS
BODY MASS INDEX: 24.22 KG/M2 | HEART RATE: 84 BPM | SYSTOLIC BLOOD PRESSURE: 120 MMHG | TEMPERATURE: 96.7 F | DIASTOLIC BLOOD PRESSURE: 76 MMHG | WEIGHT: 154.32 LBS | OXYGEN SATURATION: 99 % | HEIGHT: 67 IN

## 2023-01-03 DIAGNOSIS — Z00.00 ENCOUNTER FOR GENERAL ADULT MEDICAL EXAMINATION W/OUT ABNORMAL FINDINGS: ICD-10-CM

## 2023-01-03 DIAGNOSIS — K21.9 GASTRO-ESOPHAGEAL REFLUX DISEASE W/OUT ESOPHAGITIS: ICD-10-CM

## 2023-01-03 DIAGNOSIS — A04.8 OTHER SPECIFIED BACTERIAL INTESTINAL INFECTIONS: ICD-10-CM

## 2023-01-03 PROCEDURE — 99215 OFFICE O/P EST HI 40 MIN: CPT | Mod: 24

## 2023-01-03 RX ORDER — BUPROPION HYDROCHLORIDE 150 MG/1
150 TABLET, FILM COATED, EXTENDED RELEASE ORAL DAILY
Qty: 7 | Refills: 0 | Status: DISCONTINUED | COMMUNITY
Start: 2022-12-06 | End: 2023-01-03

## 2023-01-03 RX ORDER — SODIUM PICOSULFATE, MAGNESIUM OXIDE, AND ANHYDROUS CITRIC ACID 10; 3.5; 12 MG/160ML; G/160ML; G/160ML
10-3.5-12 MG-GM LIQUID ORAL
Qty: 1 | Refills: 0 | Status: DISCONTINUED | COMMUNITY
Start: 2022-10-26 | End: 2023-01-03

## 2023-01-03 RX ORDER — LINACLOTIDE 145 UG/1
145 CAPSULE, GELATIN COATED ORAL DAILY
Refills: 0 | Status: ACTIVE | COMMUNITY

## 2023-01-03 NOTE — ASSESSMENT
[FreeTextEntry1] : 27-year-old female with a history of laparoscopic sleeve gastrectomy converted to Brittany-en-Y gastric bypass with chronic abdominal pain now improving s/p laparoscopy with extensive RAIZA, and treatment of H pylori\par \par I discussed healthy eating with patient and encouraged her to begin an exercise program again, no restrictions from surgical perspective at this point.\par -Follow-up with GI as scheduled in February for repeat H. pylori testing, further treatment as indicated\par -continue PPI for reflux\par -Continue multivitamins, B12, probiotics\par -Follow-up with PCP and hematologist for repeating iron studies and B12, supplement as needed\par -Follow-up with me in 6 months to reevaluate abdominal pain, and we will check all vitamin levels that have not been checked by her PCP or heme at that time\par -Patient educated to call with worsening abdominal pain, or recurrence of symptoms, or any questions\par -cleared to return to work

## 2023-01-03 NOTE — PHYSICAL EXAM
[Normal] : affect appropriate [de-identified] : Anicteric, no conjunctival injection [de-identified] : Supple [de-identified] : Equal chest rise, nonlabored respirations. No audible wheezing. [de-identified] : Regular rate and rhythm. [de-identified] : Soft, nondistended, nontender.  Well-healed laparoscopic incisions.

## 2023-01-11 ENCOUNTER — APPOINTMENT (OUTPATIENT)
Dept: PSYCHIATRY | Facility: CLINIC | Age: 28
End: 2023-01-11
Payer: COMMERCIAL

## 2023-01-11 ENCOUNTER — APPOINTMENT (OUTPATIENT)
Dept: GASTROENTEROLOGY | Facility: AMBULATORY MEDICAL SERVICES | Age: 28
End: 2023-01-11

## 2023-01-11 DIAGNOSIS — F51.02 ADJUSTMENT INSOMNIA: ICD-10-CM

## 2023-01-11 PROCEDURE — 99214 OFFICE O/P EST MOD 30 MIN: CPT | Mod: 95

## 2023-01-11 NOTE — HISTORY OF PRESENT ILLNESS
[Home] : at home, [unfilled] , at the time of the visit. [Medical Office: ([facility])___] : at the medical office located in  [Verbal consent obtained from patient] : the patient, [unfilled] [de-identified] : No medication changes last month. Patient states she is feeling pretty good. Went back to work. \par \par Mood:has anxiety but states it is normal anxiety that is manageable. \par Sleep: is better but states only getting 4-5 hours of sleep full. No Trazoodne. Wants to go back to on it. \par Appetite: better. Has been eating\par Energy: better. Working out. \par Concentration: good with Strattera\par Motivation: better\par Denies any AVH, SI or HI.\par Last drink was 7/20/2021\par Going to 5 meetings per week and individual with Shonda Bourgeois at [facility] once a week. +Sponsor \par Denies any cravings, urges or relapse.\par No Vivitrol.

## 2023-01-11 NOTE — PAST MEDICAL HISTORY
[FreeTextEntry1] : Past Psyc Hx:  Pt started treatment for anxiety/depression at age 13.  She struggled with body dysmorphic disorder. Was on several medications but never been adherent to it. Was on/off medications. 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 She started to self medicate with alcohol and started to abuse it. Her wake up call was when she went to work intoxicated and was pulled aside. Pt started Rehab in 2021 for AUD, for a month. Continued to see Dr Shah and is currently on Wellbutrin, Strattera and Trazodone. She is looking  to switch providers because of change of insurance. \par \par Past Psych Hospitalization: \par \par Psychotherapy:   Seeing [employer]  every week at [facility]. \par \par Suicide attempt:  Denies \par \par SIB:  Denies\par \par  \par \par Allergies:  PCN\par \par  \par \par Major Medical Problems:   Migraine\par \par Prescription medications:  birth control and her psychotropic medications\par \par OTC medications: Advil, prilosec\par \par TBI: None \par \par Seizures: None \par \par Migraine HA:  Last week, goes to chiropractor \par \par  \par \par Surgery:  Bariatric surgery at age 16, revision done in , bowel obstruction surgery in \par \par  \par \par Substance Use History: \par \par  \par \par Nicotine: Vaping occasionally \par \par Alcohol: Sober for the last 10 months.  Started drinking in senior year of college. Her drinking got worse since the pandemic, would drink 2 bottles of wine, mix drinks.  "I was a black out drinker." \par \par CAGE Questionnaire : negative \par \par Illicit drugs: MJ last smoked was May of last year\par \par Withdrawal: n/a \par \par Hospitalizations: n/a \par \par Detox/Rehab tx:  Went to [facility] rehab in 2021 for a month\par \par  \par \par Developmental History \par \par  \par \par Pre/ problems: Unremarkable \par \par Developmental milestones: Unremarkable \par \par  \par \par  \par \par OBGYN Hx: \par \par Menstrual cycle: Menarche:  8 y/o , LMP: current  \par \par  \par \par Pregnancies/Miscarriage:  None\par \par  \par \par Menopause: n/a\par \par

## 2023-01-11 NOTE — SOCIAL HISTORY
[Alone] : lives alone [Employed] : employed [Never ] : never  [College] : College [None] : none [FreeTextEntry1] : Social History:  \par \par Born and raised: Born and raised in [place]. \par \par Siblings: She is the older of 4 sibling, 2 are half sibling\par \par Parents:   when pt was 2 y/o . Father has his own [employer] business, mom work in a construction company. \par \par Childhood:  was traumatic. Mom has been in and out of Psychiatric hospitals. Pt and her brother lived with dad, had supervised visits with mom, had to go for court mandated therapy. . Dad remarried someone very young. Pt did not have a good relationship with step mom. When in  both pt and her brother started to live with her mother. \par \par Education:  was an average student, she was given her mom's adderall and she did had bad SE. She had focus and attention issues but was not diagnosed in . She went to college at [employer] did her Bachelors in Psychology. Started seeing her mothers psychiatrist and was Rxed Ritalin that helped with focus and attention. Did her BSN from [employer]. 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 \par \par Relationship with parents: with dad  is ok now, with mom it is complicated and she tries to  keep a distance\par \par Relationship with siblings: good \par \par Romantic relationship/marriage:  \par \par  \par \par Legal Hx: Denies \par \par  \par \par Access to firearms: Denies

## 2023-01-31 ENCOUNTER — APPOINTMENT (OUTPATIENT)
Dept: GASTROENTEROLOGY | Facility: AMBULATORY MEDICAL SERVICES | Age: 28
End: 2023-01-31

## 2023-03-06 ENCOUNTER — NON-APPOINTMENT (OUTPATIENT)
Age: 28
End: 2023-03-06

## 2023-03-06 ENCOUNTER — APPOINTMENT (OUTPATIENT)
Dept: OBGYN | Facility: CLINIC | Age: 28
End: 2023-03-06
Payer: COMMERCIAL

## 2023-03-06 VITALS
DIASTOLIC BLOOD PRESSURE: 80 MMHG | SYSTOLIC BLOOD PRESSURE: 110 MMHG | BODY MASS INDEX: 22.91 KG/M2 | WEIGHT: 146 LBS | HEIGHT: 67 IN

## 2023-03-06 PROCEDURE — 99395 PREV VISIT EST AGE 18-39: CPT

## 2023-03-06 NOTE — HISTORY OF PRESENT ILLNESS
[FreeTextEntry1] : 26 yo here for av, on [id number] doing well. she had surgery for an obstruction from adhesions in the fall.  [Currently Active] : currently active [Men] : men [Vaginal] : vaginal [Yes] : Yes

## 2023-03-06 NOTE — DISCUSSION/SUMMARY
[FreeTextEntry1] : no smoking, RBAD .\par Discussed the risks of DVT and blood clots,strokes\par  good and bad side effects of the pill discussed and instructions on how to take pills and when to use back up. \par Encouraged exercise , \par good diet filled with,plant based foods, calcium and vit.D.rtn 12months. \par Discussed SBE,\par Discussed the NIH suggests minimum of 2.5 hours of exercise a week\par If contracted covid call office to change to progesterone only pill(Slynd) for 3 months) DIsc. hypercoagulative state/or ASA therapy low dose\par Answered any questions she may have.\par

## 2023-03-09 LAB — CYTOLOGY CVX/VAG DOC THIN PREP: NORMAL

## 2023-04-24 NOTE — ASU DISCHARGE PLAN (ADULT/PEDIATRIC) - ***IN THE EVENT THAT YOU DEVELOP A COMPLICATION AND YOU ARE UNABLE TO REACH YOUR OWN PHYSICIAN, YOU MAY CONTACT:
Medicare Preventive Visit Patient Instructions  Thank you for completing your Welcome to Medicare Visit or Medicare Annual Wellness Visit today  Your next wellness visit will be due in one year (4/24/2024)  The screening/preventive services that you may require over the next 5-10 years are detailed below  Some tests may not apply to you based off risk factors and/or age  Screening tests ordered at today's visit but not completed yet may show as past due  Also, please note that scanned in results may not display below  Preventive Screenings:  Service Recommendations Previous Testing/Comments   Colorectal Cancer Screening  * Colonoscopy    * Fecal Occult Blood Test (FOBT)/Fecal Immunochemical Test (FIT)  * Fecal DNA/Cologuard Test  * Flexible Sigmoidoscopy Age: 39-70 years old   Colonoscopy: every 10 years (may be performed more frequently if at higher risk)  OR  FOBT/FIT: every 1 year  OR  Cologuard: every 3 years  OR  Sigmoidoscopy: every 5 years  Screening may be recommended earlier than age 39 if at higher risk for colorectal cancer  Also, an individualized decision between you and your healthcare provider will decide whether screening between the ages of 74-80 would be appropriate  Colonoscopy: 08/30/2013  FOBT/FIT: Not on file  Cologuard: Not on file  Sigmoidoscopy: Not on file          Breast Cancer Screening Age: 36 years old  Frequency: every 1-2 years  Not required if history of left and right mastectomy Mammogram: 04/05/2022    Screening Current   Cervical Cancer Screening Between the ages of 21-29, pap smear recommended once every 3 years  Between the ages of 33-67, can perform pap smear with HPV co-testing every 5 years     Recommendations may differ for women with a history of total hysterectomy, cervical cancer, or abnormal pap smears in past  Pap Smear: 12/16/2015        Hepatitis C Screening Once for adults born between 1945 and 1965  More frequently in patients at high risk for Hepatitis C Hep C Antibody: 01/30/2021    Screening Current   Diabetes Screening 1-2 times per year if you're at risk for diabetes or have pre-diabetes Fasting glucose: 87 mg/dL (5/17/2022)  A1C: 6 3 % (1/4/2023)  Screening Not Indicated  History Diabetes   Cholesterol Screening Once every 5 years if you don't have a lipid disorder  May order more often based on risk factors  Lipid panel: 05/17/2022    Screening Not Indicated  History Lipid Disorder     Other Preventive Screenings Covered by Medicare:  1  Abdominal Aortic Aneurysm (AAA) Screening: covered once if your at risk  You're considered to be at risk if you have a family history of AAA  2  Lung Cancer Screening: covers low dose CT scan once per year if you meet all of the following conditions: (1) Age 50-69; (2) No signs or symptoms of lung cancer; (3) Current smoker or have quit smoking within the last 15 years; (4) You have a tobacco smoking history of at least 20 pack years (packs per day multiplied by number of years you smoked); (5) You get a written order from a healthcare provider  3  Glaucoma Screening: covered annually if you're considered high risk: (1) You have diabetes OR (2) Family history of glaucoma OR (3)  aged 48 and older OR (3)  American aged 72 and older  3  Osteoporosis Screening: covered every 2 years if you meet one of the following conditions: (1) You're estrogen deficient and at risk for osteoporosis based off medical history and other findings; (2) Have a vertebral abnormality; (3) On glucocorticoid therapy for more than 3 months; (4) Have primary hyperparathyroidism; (5) On osteoporosis medications and need to assess response to drug therapy  · Last bone density test (DXA Scan): Not on file  5  HIV Screening: covered annually if you're between the age of 12-76  Also covered annually if you are younger than 13 and older than 72 with risk factors for HIV infection   For pregnant patients, it is covered up to 3 times per pregnancy  Immunizations:  Immunization Recommendations   Influenza Vaccine Annual influenza vaccination during flu season is recommended for all persons aged >= 6 months who do not have contraindications   Pneumococcal Vaccine   * Pneumococcal conjugate vaccine = PCV13 (Prevnar 13), PCV15 (Vaxneuvance), PCV20 (Prevnar 20)  * Pneumococcal polysaccharide vaccine = PPSV23 (Pneumovax) Adults 25-60 years old: 1-3 doses may be recommended based on certain risk factors  Adults 72 years old: 1-2 doses may be recommended based off what pneumonia vaccine you previously received   Hepatitis B Vaccine 3 dose series if at intermediate or high risk (ex: diabetes, end stage renal disease, liver disease)   Tetanus (Td) Vaccine - COST NOT COVERED BY MEDICARE PART B Following completion of primary series, a booster dose should be given every 10 years to maintain immunity against tetanus  Td may also be given as tetanus wound prophylaxis  Tdap Vaccine - COST NOT COVERED BY MEDICARE PART B Recommended at least once for all adults  For pregnant patients, recommended with each pregnancy  Shingles Vaccine (Shingrix) - COST NOT COVERED BY MEDICARE PART B  2 shot series recommended in those aged 48 and above     Health Maintenance Due:      Topic Date Due   • HIV Screening  Never done   • Cervical Cancer Screening  12/16/2018   • Colorectal Cancer Screening  Never done   • Breast Cancer Screening: Mammogram  04/05/2023   • Hepatitis C Screening  Completed     Immunizations Due:      Topic Date Due   • COVID-19 Vaccine (1) Never done   • Pneumococcal Vaccine: Pediatrics (0 to 5 Years) and At-Risk Patients (6 to 59 Years) (1 - PCV) Never done   • Influenza Vaccine (1) 09/01/2022     Advance Directives   What are advance directives? Advance directives are legal documents that state your wishes and plans for medical care  These plans are made ahead of time in case you lose your ability to make decisions for yourself   Advance directives can apply to any medical decision, such as the treatments you want, and if you want to donate organs  What are the types of advance directives? There are many types of advance directives, and each state has rules about how to use them  You may choose a combination of any of the following:  · Living will: This is a written record of the treatment you want  You can also choose which treatments you do not want, which to limit, and which to stop at a certain time  This includes surgery, medicine, IV fluid, and tube feedings  · Durable power of  for healthcare [facility]): This is a written record that states who you want to make healthcare choices for you when you are unable to make them for yourself  This person, called a proxy, is usually a family member or a friend  You may choose more than 1 proxy  · Do not resuscitate (DNR) order:  A DNR order is used in case your heart stops beating or you stop breathing  It is a request not to have certain forms of treatment, such as CPR  A DNR order may be included in other types of advance directives  · Medical directive: This covers the care that you want if you are in a coma, near death, or unable to make decisions for yourself  You can list the treatments you want for each condition  Treatment may include pain medicine, surgery, blood transfusions, dialysis, IV or tube feedings, and a ventilator (breathing machine)  · Values history: This document has questions about your views, beliefs, and how you feel and think about life  This information can help others choose the care that you would choose  Why are advance directives important? An advance directive helps you control your care  Although spoken wishes may be used, it is better to have your wishes written down  Spoken wishes can be misunderstood, or not followed  Treatments may be given even if you do not want them   An advance directive may make it easier for your family to make difficult choices about your care  Urinary Incontinence   Urinary incontinence (UI)  is when you lose control of your bladder  UI develops because your bladder cannot store or empty urine properly  The 3 most common types of UI are stress incontinence, urge incontinence, or both  Medicines:   · May be given to help strengthen your bladder control  Report any side effects of medication to your healthcare provider  Do pelvic muscle exercises often:  Your pelvic muscles help you stop urinating  Squeeze these muscles tight for 5 seconds, then relax for 5 seconds  Gradually work up to squeezing for 10 seconds  Do 3 sets of 15 repetitions a day, or as directed  This will help strengthen your pelvic muscles and improve bladder control  Train your bladder:  Go to the bathroom at set times, such as every 2 hours, even if you do not feel the urge to go  You can also try to hold your urine when you feel the urge to go  For example, hold your urine for 5 minutes when you feel the urge to go  As that becomes easier, hold your urine for 10 minutes  Self-care:   · Keep a UI record  Write down how often you leak urine and how much you leak  Make a note of what you were doing when you leaked urine  · Drink liquids as directed  You may need to limit the amount of liquid you drink to help control your urine leakage  Do not drink any liquid right before you go to bed  Limit or do not have drinks that contain caffeine or alcohol  · Prevent constipation  Eat a variety of high-fiber foods  Good examples are high-fiber cereals, beans, vegetables, and whole-grain breads  Walking is the best way to trigger your intestines to have a bowel movement  · Exercise regularly and maintain a healthy weight  Weight loss and exercise will decrease pressure on your bladder and help you control your leakage  · Use a catheter as directed  to help empty your bladder   A catheter is a tiny, plastic tube that is put into your bladder to drain your urine    · Go to behavior therapy as directed  Behavior therapy may be used to help you learn to control your urge to urinate  Weight Management   Why it is important to manage your weight:  Being overweight increases your risk of health conditions such as heart disease, high blood pressure, type 2 diabetes, and certain types of cancer  It can also increase your risk for osteoarthritis, sleep apnea, and other respiratory problems  Aim for a slow, steady weight loss  Even a small amount of weight loss can lower your risk of health problems  How to lose weight safely:  A safe and healthy way to lose weight is to eat fewer calories and get regular exercise  You can lose up about 1 pound a week by decreasing the number of calories you eat by 500 calories each day  Healthy meal plan for weight management:  A healthy meal plan includes a variety of foods, contains fewer calories, and helps you stay healthy  A healthy meal plan includes the following:  · Eat whole-grain foods more often  A healthy meal plan should contain fiber  Fiber is the part of grains, fruits, and vegetables that is not broken down by your body  Whole-grain foods are healthy and provide extra fiber in your diet  Some examples of whole-grain foods are whole-wheat breads and pastas, oatmeal, brown rice, and bulgur  · Eat a variety of vegetables every day  Include dark, leafy greens such as spinach, kale, tera greens, and mustard greens  Eat yellow and orange vegetables such as carrots, sweet potatoes, and winter squash  · Eat a variety of fruits every day  Choose fresh or canned fruit (canned in its own juice or light syrup) instead of juice  Fruit juice has very little or no fiber  · Eat low-fat dairy foods  Drink fat-free (skim) milk or 1% milk  Eat fat-free yogurt and low-fat cottage cheese  Try low-fat cheeses such as mozzarella and other reduced-fat cheeses  · Choose meat and other protein foods that are low in fat    Choose beans or other legumes such as split peas or lentils  Choose fish, skinless poultry (chicken or turkey), or lean cuts of red meat (beef or pork)  Before you cook meat or poultry, cut off any visible fat  · Use less fat and oil  Try baking foods instead of frying them  Add less fat, such as margarine, sour cream, regular salad dressing and mayonnaise to foods  Eat fewer high-fat foods  Some examples of high-fat foods include french fries, doughnuts, ice cream, and cakes  · Eat fewer sweets  Limit foods and drinks that are high in sugar  This includes candy, cookies, regular soda, and sweetened drinks  Exercise:  Exercise at least 30 minutes per day on most days of the week  Some examples of exercise include walking, biking, dancing, and swimming  You can also fit in more physical activity by taking the stairs instead of the elevator or parking farther away from stores  Ask your healthcare provider about the best exercise plan for you  © Copyright [address] 2018 Information is for End User's use only and may not be sold, redistributed or otherwise used for commercial purposes   All illustrations and images included in CareNotes® are the copyrighted property of [employer]  or [address] [place] [address] Statement Selected

## 2023-05-07 ENCOUNTER — OUTPATIENT (OUTPATIENT)
Dept: OUTPATIENT SERVICES | Facility: HOSPITAL | Age: 28
LOS: 1 days | Discharge: HOME | End: 2023-05-07

## 2023-05-07 DIAGNOSIS — Z98.890 OTHER SPECIFIED POSTPROCEDURAL STATES: Chronic | ICD-10-CM

## 2023-05-07 DIAGNOSIS — D50.9 IRON DEFICIENCY ANEMIA, UNSPECIFIED: ICD-10-CM

## 2023-05-07 DIAGNOSIS — Z98.84 BARIATRIC SURGERY STATUS: Chronic | ICD-10-CM

## 2023-05-07 DIAGNOSIS — Z90.3 ACQUIRED ABSENCE OF STOMACH [PART OF]: Chronic | ICD-10-CM

## 2023-05-11 ENCOUNTER — APPOINTMENT (OUTPATIENT)
Dept: PSYCHIATRY | Facility: CLINIC | Age: 28
End: 2023-05-11
Payer: COMMERCIAL

## 2023-05-11 PROCEDURE — 99214 OFFICE O/P EST MOD 30 MIN: CPT | Mod: 95

## 2023-05-11 NOTE — DISCUSSION/SUMMARY
[FreeTextEntry1] : Assessment: Patient is a 26 yo female with h/o depression anxiety and alcohol use disorder seen today for medication management. Patient is compliant with their medications, tolerating it well without any side effects. I-STOP was checked without any problems.\par \par \par Plan:  \par Continue Wellbutrin  PO QAM for depression, low motivation, energy, concentration and decrease SSRI induced sexual dysfunction. \par Continue Strattera 100 mg QD for ADHD\par Re-start Trazodone 50 mg QHS ( No refill requested)\par  Alternative strategies including no intervention discussed with patient. Patient consents to current medications as prescribed.\par - Patient understands to contact clinic prn with concerns and agrees to call 911 or go to nearest ER if symptoms worsen.\par - Next appointment made in 6 month. Patient left the office without any distress.\par \par \par \par \par \par

## 2023-05-11 NOTE — PAST MEDICAL HISTORY
[FreeTextEntry1] : Past Psyc Hx:  Pt started treatment for anxiety/depression at age 13.  She struggled with body dysmorphic disorder. Was on several medications but never been adherent to it. Was on/off medications. 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 She started to self medicate with alcohol and started to abuse it. Her wake up call was when she went to work intoxicated and was pulled aside. Pt started Rehab in 2021 for AUD, for a month. Continued to see Dr Shah and is currently on Wellbutrin, Strattera and Trazodone. She is looking  to switch providers because of change of insurance. \par \par Past Psych Hospitalization: \par \par Psychotherapy:   Seeing [employer] [facility]  every week at [facility]. \par \par Suicide attempt:  Denies \par \par SIB:  Denies\par \par  \par \par Allergies:  PCN\par \par  \par \par Major Medical Problems:   Migraine\par \par Prescription medications:  birth control and her psychotropic medications\par \par OTC medications: Advil, prilosec\par \par TBI: None \par \par Seizures: None \par \par Migraine HA:  Last week, goes to chiropractor \par \par  \par \par Surgery:  Bariatric surgery at age 16, revision done in , bowel obstruction surgery in \par \par  \par \par Substance Use History: \par \par  \par \par Nicotine: Vaping occasionally \par \par Alcohol: Sober for the last 10 months.  Started drinking in senior year of college. Her drinking got worse since the pandemic, would drink 2 bottles of wine, mix drinks.  "I was a black out drinker." \par \par CAGE Questionnaire : negative \par \par Illicit drugs: MJ last smoked was May of last year\par \par Withdrawal: n/a \par \par Hospitalizations: n/a \par \par Detox/Rehab tx:  Went to [facility] rehab in 2021 for a month\par \par  \par \par Developmental History \par \par  \par \par Pre/ problems: Unremarkable \par \par Developmental milestones: Unremarkable \par \par  \par \par  \par \par OBGYN Hx: \par \par Menstrual cycle: Menarche:  8 y/o , LMP: current  \par \par  \par \par Pregnancies/Miscarriage:  None\par \par  \par \par Menopause: n/a\par \par

## 2023-05-11 NOTE — HISTORY OF PRESENT ILLNESS
[Home] : at home, [unfilled] , at the time of the visit. [Medical Office: ([facility])___] : at the medical office located in  [Verbal consent obtained from patient] : the patient, [unfilled] [No] : no [de-identified] : Patient is here for 3 month followup visit via telehealth\par \par No medication changes at that time. Patient state she is doing great. Last seen in Jan 2023\par \par \par Mood:less depression and anxiety is manageable\par Sleep: can't sleep more than 4 hours. No Trazodone\par Appetite: better. Has been eating\par Energy: decrease due to lack of sleep but she is workout.  \par Concentration: good with Strattera\par Motivation: good\par Denies any AVH, SI or HI.\par Last drink was 7/20/2021. Brother also celebrated 2 months of sobriety. \par Going to 5 meetings per week and individual with Shonda Bourgeois at [facility] once a week. +Sponsor \par Denies any cravings, urges or relapse.\par No Vivitrol.  [de-identified] : Intake:    27 y/o  seen today for intake and initial MH evaluation. 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 Pt lives alone, her family is from [place]\par \par  \par \par Chief compliant: Establish care and medication management.\par \par  \par \par History of present illness: Pt started treatment for anxiety/depression at age 13.  She struggled with body dysmorphic disorder. Was on several medications but never been adherent to it. Was on/off medications. 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 She started to self medicate with alcohol and started to abuse it. Her wake up call was when she went to work intoxicated and was pulled aside. Pt started Rehab in July of 2021 for AUD, for a month. Pt is sober for 10 months. Attends AA meetings and has a sponsor. 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 She is looking  to switch providers because of change of insurance. \par \par \par  \par \par Depression: Pt denies sx consistent with MDD. "I have not felt depressed since I have been sober."  Sleep is 'better now' Appetite is good. She struggles with body image. Constantly feels that she is over wt. Had bariatric surgery at age 16 and has lost 100 lbs since and has been able to maintain it . Denies SI/intent or HI\par \par  \par \par Susannah/Hypomania:  Denies episodes in the recent or remote past \par \par  \par \par Anxiety:  Anxiety has improved , she use to have 'crippling anxiety' during COVID and often self medicated with alcohol Denies Panic attacks or sx consistent with OCD/PTSD \par \par  \par \par Eating Disorder: Denies any  current symptoms. Pt was an emotional eater,  used to purge after stuffing herself.  Last done was 2 years ago. \par \par  \par \par Psychosis: Denies AH/VH, does not illicit any delusional thoughts during this assessment. \par \par

## 2023-05-11 NOTE — SOCIAL HISTORY
[Alone] : lives alone [Employed] : employed [Never ] : never  [College] : College [None] : none [FreeTextEntry1] : Social History:  \par \par Born and raised: Born and raised in [facility]. \par \par Siblings: She is the older of 4 sibling, 2 are half sibling\par \par Parents:   when pt was 4 y/o . Father has his own [employer] business, mom work in a construction company. \par \par Childhood:  was traumatic. Mom has been in and out of Psychiatric hospitals. Pt and her brother lived with dad, had supervised visits with mom, had to go for court mandated therapy. . Dad remarried someone very young. Pt did not have a good relationship with step mom. When in  both pt and her brother started to live with her mother. \par \par Education:  was an average student, she was given her mom's adderall and she did had bad SE. She had focus and attention issues but was not diagnosed in . She went to college at [employer] did her Bachelors in Psychology. Started seeing her mothers psychiatrist and was Rxed Ritalin that helped with focus and attention. Did her BSN from [place]. 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 \par \par Relationship with parents: with dad  is ok now, with mom it is complicated and she tries to  keep a distance\par \par Relationship with siblings: good \par \par Romantic relationship/marriage:  \par \par  \par \par Legal Hx: Denies \par \par  \par \par Access to firearms: Denies

## 2023-05-15 ENCOUNTER — APPOINTMENT (OUTPATIENT)
Dept: HEMATOLOGY ONCOLOGY | Facility: CLINIC | Age: 28
End: 2023-05-15
Payer: COMMERCIAL

## 2023-05-15 VITALS
WEIGHT: 136.38 LBS | HEIGHT: 67 IN | SYSTOLIC BLOOD PRESSURE: 99 MMHG | DIASTOLIC BLOOD PRESSURE: 65 MMHG | RESPIRATION RATE: 18 BRPM | TEMPERATURE: 98.7 F | OXYGEN SATURATION: 100 % | HEART RATE: 88 BPM | BODY MASS INDEX: 21.4 KG/M2

## 2023-05-15 DIAGNOSIS — K95.89 OTHER COMPLICATIONS OF OTHER BARIATRIC PROCEDURE: ICD-10-CM

## 2023-05-15 DIAGNOSIS — R41.89 OTHER SYMPTOMS AND SIGNS INVOLVING COGNITIVE FUNCTIONS AND AWARENESS: ICD-10-CM

## 2023-05-15 DIAGNOSIS — D50.8 OTHER COMPLICATIONS OF OTHER BARIATRIC PROCEDURE: ICD-10-CM

## 2023-05-15 PROCEDURE — 99213 OFFICE O/P EST LOW 20 MIN: CPT

## 2023-05-15 NOTE — ASSESSMENT
[FreeTextEntry1] : Patient is a 27 y.o. with recurrent multifactorial iron deficiency anemia. \par Felt to be from  decreased absorption from her bariatric surgeries, also from hx of heavy periods.\par Has been ~ 6 months since last IV iron infusion.  \par  \par Now very early iron deficiency - Ferritin <50.  TSAT 50% but patient admits had labs done after she had breakfast.  Prior TSAT was 7%.  \par Symptomatic with fatigue and brain fog. \par Recommend Feraheme x 1.   \par B12 also low normal - patient taking B12 but level still < 400. \par Taking 2500mcg SL methylcobalamin daily. \par Advised to take (2) 2500mcg tabs daily.  \par All questions answered. \par Check labs 1 month post. \par \par PCP CARMEN HOLLY \par Dr. Wright

## 2023-05-15 NOTE — REVIEW OF SYSTEMS
[Patient Intake Form Reviewed] : Patient intake form was reviewed [Fatigue] : fatigue [Constipation] : constipation [Anxiety] : anxiety [Negative] : Allergic/Immunologic [Dizziness] : dizziness [FreeTextEntry7] : nausea [de-identified] : Brain fog, headaches [de-identified] : sleep problems (works night shift)

## 2023-05-15 NOTE — HISTORY OF PRESENT ILLNESS
[de-identified] : ARSLAN NATARAJAN is a 27 y.o.with a PMH significant for gastric sleeve 2/2011, then guerrero -en-Y bypass 1/2020 due to severe GERD, and then ELAP, RAIZA 4/2020 for SBO, who we are following for an iron deficient anemia. \par \par 5/2012 - S/P sleeve gastrectomy, after which patient lost 100 pounds. \par 1/2020 - Patient underwent revision of her prior bariatric surgery- RnY 2nd to severe GERD. \par 4/2020 - Developed SBO - s/p ELAP, RAIZA.  \par Patient has a history of heavy menstrual periods with iron deficiency anemia. \par She is no longer able to tolerate PO iron with recent bowel issues.\par Had been getting IV iron PRN, last 6/2020 (Dr. Cheryl Norris). \par \par 10/11/21 - Initially seen in our office - (Transferred her care to our office as we are closer to her house). \par 9/23/21 -  WBC: 7.89  Hgb: 11.1  Hct: 37.0  MCV: 88.1  Plts: 280, Ferritin: 9  TSAT: 6%\par B12: 328 on 3/22/21 \par 10/28/21 - Completed Venofer and B12 injections x 4.  Labs ~ 2 months post with good response -  12/21/21 -  Ferritin 126, TSAT 29%, Hgb: 12.8, MCV: 90\par \par 10/3/22 - WBC: 8.98,  Hgb: 10.8,  Hct: 35.3,  MCV: 88.3,  Plts: 344,  Ferritin: 8,  TSAT: 7%,  B12: 214.\par Feraheme x 2 with SQ B12.  Instructed to start on SL B12. \par No repeat labs since. [de-identified] : Labs done by PCP - \par 3/24/23:  WBC: 6.88,  Hgb: 12.5,  Hct: 39.9,  MCV: 90.9,  Plts: 271,  Ferritin: 91,  TSAT: 7% \par 5/10/23:  WBC: 3.87,  Hgb: 12.1,  Hct: 39.6,  MCV: 92.5,  Plts: 271,  Ferritin: 46,  TSAT: 50%,  B12: 226,  Folate: 10.0\par \par Patient reports she feels like she needs IV iron - started having symptoms in March but now has significant fatigue and brain fog.  Denies  pica. \par Also increased lightheadedness and tachycardia.  \par Has been compliant with OTC SL B12 -  2500mcg, methyl type. \par \par She is still on birth control pills and her periods have remained on the light side.\par \par Constipation prone.   \par \par Denies any changes in her family, medical, or social history since her last visit of 10/5/22.

## 2023-05-15 NOTE — PHYSICAL EXAM
[Normal] : affect appropriate [de-identified] : anicteric [de-identified] : breathing appeared unlabored [de-identified] : no c/c/e [de-identified] : no rashes

## 2023-05-17 DIAGNOSIS — R41.89 OTHER SYMPTOMS AND SIGNS INVOLVING COGNITIVE FUNCTIONS AND AWARENESS: ICD-10-CM

## 2023-05-17 DIAGNOSIS — K95.89 OTHER COMPLICATIONS OF OTHER BARIATRIC PROCEDURE: ICD-10-CM

## 2023-05-17 DIAGNOSIS — E53.8 DEFICIENCY OF OTHER SPECIFIED B GROUP VITAMINS: ICD-10-CM

## 2023-05-17 DIAGNOSIS — Z98.84 BARIATRIC SURGERY STATUS: ICD-10-CM

## 2023-05-22 ENCOUNTER — APPOINTMENT (OUTPATIENT)
Dept: INFUSION THERAPY | Facility: CLINIC | Age: 28
End: 2023-05-22

## 2023-06-19 ENCOUNTER — RESULT REVIEW (OUTPATIENT)
Age: 28
End: 2023-06-19

## 2023-06-19 ENCOUNTER — APPOINTMENT (OUTPATIENT)
Dept: HEMATOLOGY ONCOLOGY | Facility: CLINIC | Age: 28
End: 2023-06-19

## 2023-06-19 LAB
BASOPHILS # BLD AUTO: 0.03 K/UL — SIGNIFICANT CHANGE UP (ref 0–0.2)
BASOPHILS NFR BLD AUTO: 0.8 % — SIGNIFICANT CHANGE UP (ref 0–2)
EOSINOPHIL # BLD AUTO: 0.19 K/UL — SIGNIFICANT CHANGE UP (ref 0–0.5)
EOSINOPHIL NFR BLD AUTO: 5.3 % — SIGNIFICANT CHANGE UP (ref 0–6)
ERYTHROCYTE [SEDIMENTATION RATE] IN BLOOD: 5 MM/HR — SIGNIFICANT CHANGE UP (ref 0–15)
FERRITIN SERPL-MCNC: 270 NG/ML — HIGH (ref 15–150)
HCT VFR BLD CALC: 35.7 % — SIGNIFICANT CHANGE UP (ref 34.5–45)
HGB BLD-MCNC: 11.8 G/DL — SIGNIFICANT CHANGE UP (ref 11.5–15.5)
IMM GRANULOCYTES NFR BLD AUTO: 0.3 % — SIGNIFICANT CHANGE UP (ref 0–0.9)
IRON SATN MFR SERPL: 16 % — SIGNIFICANT CHANGE UP (ref 14–50)
IRON SATN MFR SERPL: 40 UG/DL — SIGNIFICANT CHANGE UP (ref 30–160)
LYMPHOCYTES # BLD AUTO: 1.31 K/UL — SIGNIFICANT CHANGE UP (ref 1–3.3)
LYMPHOCYTES # BLD AUTO: 36.4 % — SIGNIFICANT CHANGE UP (ref 13–44)
MCHC RBC-ENTMCNC: 29.7 PG — SIGNIFICANT CHANGE UP (ref 27–34)
MCHC RBC-ENTMCNC: 33.1 GM/DL — SIGNIFICANT CHANGE UP (ref 32–36)
MCV RBC AUTO: 89.9 FL — SIGNIFICANT CHANGE UP (ref 80–100)
MONOCYTES # BLD AUTO: 0.49 K/UL — SIGNIFICANT CHANGE UP (ref 0–0.9)
MONOCYTES NFR BLD AUTO: 13.6 % — SIGNIFICANT CHANGE UP (ref 2–14)
NEUTROPHILS # BLD AUTO: 1.57 K/UL — LOW (ref 1.8–7.4)
NEUTROPHILS NFR BLD AUTO: 43.6 % — SIGNIFICANT CHANGE UP (ref 43–77)
NRBC # BLD: 0 /100 WBCS — SIGNIFICANT CHANGE UP (ref 0–0)
PLATELET # BLD AUTO: 253 K/UL — SIGNIFICANT CHANGE UP (ref 150–400)
RBC # BLD: 3.97 M/UL — SIGNIFICANT CHANGE UP (ref 3.8–5.2)
RBC # FLD: 13.1 % — SIGNIFICANT CHANGE UP (ref 10.3–14.5)
TIBC SERPL-MCNC: 244 UG/DL — SIGNIFICANT CHANGE UP (ref 220–430)
UIBC SERPL-MCNC: 204 UG/DL — SIGNIFICANT CHANGE UP (ref 110–370)
VIT B12 SERPL-MCNC: 275 PG/ML — SIGNIFICANT CHANGE UP (ref 232–1245)
WBC # BLD: 3.6 K/UL — LOW (ref 3.8–10.5)
WBC # FLD AUTO: 3.6 K/UL — LOW (ref 3.8–10.5)

## 2023-07-03 ENCOUNTER — APPOINTMENT (OUTPATIENT)
Dept: BARIATRICS | Facility: CLINIC | Age: 28
End: 2023-07-03

## 2023-07-10 ENCOUNTER — APPOINTMENT (OUTPATIENT)
Dept: BARIATRICS | Facility: CLINIC | Age: 28
End: 2023-07-10
Payer: COMMERCIAL

## 2023-07-10 VITALS
SYSTOLIC BLOOD PRESSURE: 100 MMHG | DIASTOLIC BLOOD PRESSURE: 70 MMHG | WEIGHT: 132 LBS | BODY MASS INDEX: 20.72 KG/M2 | TEMPERATURE: 97 F | HEIGHT: 67 IN | HEART RATE: 76 BPM | OXYGEN SATURATION: 99 %

## 2023-07-10 DIAGNOSIS — Z98.84 BARIATRIC SURGERY STATUS: ICD-10-CM

## 2023-07-10 DIAGNOSIS — K59.09 OTHER CONSTIPATION: ICD-10-CM

## 2023-07-10 PROCEDURE — 99214 OFFICE O/P EST MOD 30 MIN: CPT

## 2023-07-10 RX ORDER — TRAZODONE HYDROCHLORIDE 50 MG/1
50 TABLET ORAL
Qty: 180 | Refills: 1 | Status: DISCONTINUED | COMMUNITY
Start: 2020-02-13 | End: 2023-07-10

## 2023-07-10 RX ORDER — BIOTIN 10 MG
TABLET ORAL DAILY
Refills: 0 | Status: ACTIVE | COMMUNITY

## 2023-07-10 NOTE — ASSESSMENT
[FreeTextEntry1] : 28F with h/o severe obesity s/p laparoscopic sleeve gastrectomy converted to RYGB now with bloating, flatulance, weight loss ?SIBO\par \par -Pt educated to try a supplement such as miralax to help with her constipation\par -continue Linzess\par -will trial 10 day course of abx to see if improvement in her symptoms\par -f/u in 2 weeks\par -will recheck vitamin levels as well\par -pt advised to make an appt with our nutritionist Kristi Goldberg \par -all questions answered\par

## 2023-07-10 NOTE — HISTORY OF PRESENT ILLNESS
[de-identified] : 28-year-old female with a history of severe obesity status post laparoscopic sleeve gastrectomy in 2012, conversion to Brittany-en-Y gastric bypass for severe GERD in 2019, and a laparoscopy with lysis of adhesions for small bowel obstruction/internal hernia in 2020. She also underwent laparoscopic lysis of adhesions with mobilization of splenic flexure for partial LBO, and underwent treatment of H pylori.\par Her epigastric abdominal pain is now resolved.\par \par She notes abdominal bloating after most solid foods which causes her a lot of pain and discomfort, leading to fear of eating especially while she is at work. Pt states she has lost more weight due to eating less, especially the days she is working (3 days 7a-7p shift). Also notes increased flatulence and malodorous stools.\par Notes she is able to tolerate protein shakes, activia probiotic yogurt, tuna or chicken salad\par water intake approximately 48ozs \par Reports nausea especially in mornings but no vomiting \par Reports constipation has BM once a week while on Linzess, notes BMs are soft, also had to take an enema

## 2023-07-10 NOTE — PHYSICAL EXAM
[Normal] : affect appropriate [de-identified] : Anicteric, no conjunctival injection [de-identified] : Supple [de-identified] : Equal chest rise, nonlabored respirations. No audible wheezing. [de-identified] : Regular rate and rhythm. [de-identified] : Soft, nondistended, nontender.  Well-healed laparoscopic incisions.

## 2023-07-28 ENCOUNTER — APPOINTMENT (OUTPATIENT)
Dept: BARIATRICS | Facility: CLINIC | Age: 28
End: 2023-07-28

## 2023-08-31 ENCOUNTER — TRANSCRIPTION ENCOUNTER (OUTPATIENT)
Age: 28
End: 2023-08-31

## 2023-09-01 ENCOUNTER — APPOINTMENT (OUTPATIENT)
Dept: BARIATRICS/WEIGHT MGMT | Facility: CLINIC | Age: 28
End: 2023-09-01

## 2023-10-16 ENCOUNTER — APPOINTMENT (OUTPATIENT)
Dept: OBGYN | Facility: CLINIC | Age: 28
End: 2023-10-16
Payer: COMMERCIAL

## 2023-10-16 VITALS
WEIGHT: 135 LBS | HEART RATE: 74 BPM | RESPIRATION RATE: 15 BRPM | SYSTOLIC BLOOD PRESSURE: 110 MMHG | HEIGHT: 67 IN | DIASTOLIC BLOOD PRESSURE: 60 MMHG | BODY MASS INDEX: 21.19 KG/M2

## 2023-10-16 DIAGNOSIS — Z30.41 ENCOUNTER FOR SURVEILLANCE OF CONTRACEPTIVE PILLS: ICD-10-CM

## 2023-10-16 PROCEDURE — 99213 OFFICE O/P EST LOW 20 MIN: CPT

## 2023-11-10 ENCOUNTER — APPOINTMENT (OUTPATIENT)
Dept: PSYCHIATRY | Facility: CLINIC | Age: 28
End: 2023-11-10
Payer: COMMERCIAL

## 2023-11-10 PROCEDURE — 99214 OFFICE O/P EST MOD 30 MIN: CPT

## 2023-11-10 RX ORDER — VALACYCLOVIR 500 MG/1
500 TABLET, FILM COATED ORAL DAILY
Qty: 90 | Refills: 3 | Status: COMPLETED | COMMUNITY
Start: 2021-11-11 | End: 2023-11-10

## 2023-11-10 RX ORDER — METRONIDAZOLE 500 MG/1
500 TABLET ORAL 4 TIMES DAILY
Qty: 40 | Refills: 0 | Status: COMPLETED | COMMUNITY
Start: 2023-07-10 | End: 2023-11-10

## 2023-11-10 RX ORDER — NITROFURANTOIN (MONOHYDRATE/MACROCRYSTALS) 25; 75 MG/1; MG/1
100 CAPSULE ORAL
Qty: 60 | Refills: 2 | Status: COMPLETED | COMMUNITY
Start: 2021-10-30 | End: 2023-11-10

## 2023-11-10 RX ORDER — CIPROFLOXACIN HYDROCHLORIDE 500 MG/1
500 TABLET, FILM COATED ORAL
Qty: 20 | Refills: 0 | Status: COMPLETED | COMMUNITY
Start: 2023-07-10 | End: 2023-11-10

## 2023-12-11 ENCOUNTER — NON-APPOINTMENT (OUTPATIENT)
Age: 28
End: 2023-12-11

## 2023-12-12 ENCOUNTER — APPOINTMENT (OUTPATIENT)
Dept: PSYCHIATRY | Facility: CLINIC | Age: 28
End: 2023-12-12
Payer: COMMERCIAL

## 2023-12-12 PROCEDURE — 99214 OFFICE O/P EST MOD 30 MIN: CPT | Mod: 95

## 2023-12-12 RX ORDER — ATOMOXETINE 100 MG/1
100 CAPSULE ORAL
Qty: 90 | Refills: 1 | Status: ACTIVE | COMMUNITY
Start: 2022-05-05 | End: 1900-01-01

## 2023-12-12 RX ORDER — FLUOXETINE HYDROCHLORIDE 10 MG/1
10 CAPSULE ORAL
Qty: 30 | Refills: 0 | Status: COMPLETED | COMMUNITY
Start: 2023-11-10 | End: 2023-12-12

## 2023-12-12 RX ORDER — ATOMOXETINE 100 MG/1
100 CAPSULE ORAL
Qty: 90 | Refills: 0 | Status: COMPLETED | COMMUNITY
Start: 2023-05-10 | End: 2023-12-12

## 2023-12-12 RX ORDER — BUPROPION HYDROCHLORIDE 150 MG/1
150 TABLET, FILM COATED, EXTENDED RELEASE ORAL DAILY
Qty: 90 | Refills: 1 | Status: ACTIVE | COMMUNITY
Start: 2022-12-06 | End: 1900-01-01

## 2023-12-14 ENCOUNTER — OUTPATIENT (OUTPATIENT)
Dept: OUTPATIENT SERVICES | Facility: HOSPITAL | Age: 28
LOS: 1 days | Discharge: HOME | End: 2023-12-14

## 2023-12-14 DIAGNOSIS — E53.8 DEFICIENCY OF OTHER SPECIFIED B GROUP VITAMINS: ICD-10-CM

## 2023-12-14 DIAGNOSIS — Z98.84 BARIATRIC SURGERY STATUS: Chronic | ICD-10-CM

## 2023-12-14 DIAGNOSIS — Z98.890 OTHER SPECIFIED POSTPROCEDURAL STATES: Chronic | ICD-10-CM

## 2023-12-14 DIAGNOSIS — N92.0 EXCESSIVE AND FREQUENT MENSTRUATION WITH REGULAR CYCLE: ICD-10-CM

## 2023-12-14 DIAGNOSIS — K95.89 OTHER COMPLICATIONS OF OTHER BARIATRIC PROCEDURE: ICD-10-CM

## 2023-12-14 DIAGNOSIS — Z90.3 ACQUIRED ABSENCE OF STOMACH [PART OF]: Chronic | ICD-10-CM

## 2023-12-14 DIAGNOSIS — Z98.84 BARIATRIC SURGERY STATUS: ICD-10-CM

## 2023-12-14 DIAGNOSIS — D50.9 IRON DEFICIENCY ANEMIA, UNSPECIFIED: ICD-10-CM

## 2023-12-20 ENCOUNTER — RESULT REVIEW (OUTPATIENT)
Age: 28
End: 2023-12-20

## 2023-12-20 ENCOUNTER — APPOINTMENT (OUTPATIENT)
Dept: HEMATOLOGY ONCOLOGY | Facility: CLINIC | Age: 28
End: 2023-12-20

## 2023-12-20 LAB
BASOPHILS # BLD AUTO: 0.03 K/UL — SIGNIFICANT CHANGE UP (ref 0–0.2)
BASOPHILS # BLD AUTO: 0.03 K/UL — SIGNIFICANT CHANGE UP (ref 0–0.2)
BASOPHILS NFR BLD AUTO: 0.6 % — SIGNIFICANT CHANGE UP (ref 0–2)
BASOPHILS NFR BLD AUTO: 0.6 % — SIGNIFICANT CHANGE UP (ref 0–2)
EOSINOPHIL # BLD AUTO: 0.06 K/UL — SIGNIFICANT CHANGE UP (ref 0–0.5)
EOSINOPHIL # BLD AUTO: 0.06 K/UL — SIGNIFICANT CHANGE UP (ref 0–0.5)
EOSINOPHIL NFR BLD AUTO: 1.2 % — SIGNIFICANT CHANGE UP (ref 0–6)
EOSINOPHIL NFR BLD AUTO: 1.2 % — SIGNIFICANT CHANGE UP (ref 0–6)
ERYTHROCYTE [SEDIMENTATION RATE] IN BLOOD: 16 MM/HR — HIGH (ref 0–15)
ERYTHROCYTE [SEDIMENTATION RATE] IN BLOOD: 16 MM/HR — HIGH (ref 0–15)
HCT VFR BLD CALC: 39.1 % — SIGNIFICANT CHANGE UP (ref 34.5–45)
HCT VFR BLD CALC: 39.1 % — SIGNIFICANT CHANGE UP (ref 34.5–45)
HGB BLD-MCNC: 13.2 G/DL — SIGNIFICANT CHANGE UP (ref 11.5–15.5)
HGB BLD-MCNC: 13.2 G/DL — SIGNIFICANT CHANGE UP (ref 11.5–15.5)
IMM GRANULOCYTES NFR BLD AUTO: 0.2 % — SIGNIFICANT CHANGE UP (ref 0–0.9)
IMM GRANULOCYTES NFR BLD AUTO: 0.2 % — SIGNIFICANT CHANGE UP (ref 0–0.9)
IRON SATN MFR SERPL: 118 UG/DL — SIGNIFICANT CHANGE UP (ref 30–160)
IRON SATN MFR SERPL: 118 UG/DL — SIGNIFICANT CHANGE UP (ref 30–160)
IRON SATN MFR SERPL: 33 % — SIGNIFICANT CHANGE UP (ref 14–50)
IRON SATN MFR SERPL: 33 % — SIGNIFICANT CHANGE UP (ref 14–50)
LYMPHOCYTES # BLD AUTO: 1.41 K/UL — SIGNIFICANT CHANGE UP (ref 1–3.3)
LYMPHOCYTES # BLD AUTO: 1.41 K/UL — SIGNIFICANT CHANGE UP (ref 1–3.3)
LYMPHOCYTES # BLD AUTO: 27.6 % — SIGNIFICANT CHANGE UP (ref 13–44)
LYMPHOCYTES # BLD AUTO: 27.6 % — SIGNIFICANT CHANGE UP (ref 13–44)
MCHC RBC-ENTMCNC: 29.7 PG — SIGNIFICANT CHANGE UP (ref 27–34)
MCHC RBC-ENTMCNC: 29.7 PG — SIGNIFICANT CHANGE UP (ref 27–34)
MCHC RBC-ENTMCNC: 33.8 GM/DL — SIGNIFICANT CHANGE UP (ref 32–36)
MCHC RBC-ENTMCNC: 33.8 GM/DL — SIGNIFICANT CHANGE UP (ref 32–36)
MCV RBC AUTO: 87.9 FL — SIGNIFICANT CHANGE UP (ref 80–100)
MCV RBC AUTO: 87.9 FL — SIGNIFICANT CHANGE UP (ref 80–100)
MONOCYTES # BLD AUTO: 0.32 K/UL — SIGNIFICANT CHANGE UP (ref 0–0.9)
MONOCYTES # BLD AUTO: 0.32 K/UL — SIGNIFICANT CHANGE UP (ref 0–0.9)
MONOCYTES NFR BLD AUTO: 6.3 % — SIGNIFICANT CHANGE UP (ref 2–14)
MONOCYTES NFR BLD AUTO: 6.3 % — SIGNIFICANT CHANGE UP (ref 2–14)
NEUTROPHILS # BLD AUTO: 3.28 K/UL — SIGNIFICANT CHANGE UP (ref 1.8–7.4)
NEUTROPHILS # BLD AUTO: 3.28 K/UL — SIGNIFICANT CHANGE UP (ref 1.8–7.4)
NEUTROPHILS NFR BLD AUTO: 64.1 % — SIGNIFICANT CHANGE UP (ref 43–77)
NEUTROPHILS NFR BLD AUTO: 64.1 % — SIGNIFICANT CHANGE UP (ref 43–77)
NRBC # BLD: 0 /100 WBCS — SIGNIFICANT CHANGE UP (ref 0–0)
NRBC # BLD: 0 /100 WBCS — SIGNIFICANT CHANGE UP (ref 0–0)
PLATELET # BLD AUTO: 316 K/UL — SIGNIFICANT CHANGE UP (ref 150–400)
PLATELET # BLD AUTO: 316 K/UL — SIGNIFICANT CHANGE UP (ref 150–400)
RBC # BLD: 4.45 M/UL — SIGNIFICANT CHANGE UP (ref 3.8–5.2)
RBC # BLD: 4.45 M/UL — SIGNIFICANT CHANGE UP (ref 3.8–5.2)
RBC # FLD: 11.5 % — SIGNIFICANT CHANGE UP (ref 10.3–14.5)
RBC # FLD: 11.5 % — SIGNIFICANT CHANGE UP (ref 10.3–14.5)
TIBC SERPL-MCNC: 353 UG/DL — SIGNIFICANT CHANGE UP (ref 220–430)
TIBC SERPL-MCNC: 353 UG/DL — SIGNIFICANT CHANGE UP (ref 220–430)
UIBC SERPL-MCNC: 236 UG/DL — SIGNIFICANT CHANGE UP (ref 110–370)
UIBC SERPL-MCNC: 236 UG/DL — SIGNIFICANT CHANGE UP (ref 110–370)
WBC # BLD: 5.11 K/UL — SIGNIFICANT CHANGE UP (ref 3.8–10.5)
WBC # BLD: 5.11 K/UL — SIGNIFICANT CHANGE UP (ref 3.8–10.5)
WBC # FLD AUTO: 5.11 K/UL — SIGNIFICANT CHANGE UP (ref 3.8–10.5)
WBC # FLD AUTO: 5.11 K/UL — SIGNIFICANT CHANGE UP (ref 3.8–10.5)

## 2023-12-21 ENCOUNTER — NON-APPOINTMENT (OUTPATIENT)
Age: 28
End: 2023-12-21

## 2023-12-21 LAB
FERRITIN SERPL-MCNC: 195 NG/ML — HIGH (ref 15–150)
FERRITIN SERPL-MCNC: 195 NG/ML — HIGH (ref 15–150)
FOLATE SERPL-MCNC: 14.7 NG/ML — SIGNIFICANT CHANGE UP
FOLATE SERPL-MCNC: 14.7 NG/ML — SIGNIFICANT CHANGE UP
VIT B12 SERPL-MCNC: 279 PG/ML — SIGNIFICANT CHANGE UP (ref 232–1245)
VIT B12 SERPL-MCNC: 279 PG/ML — SIGNIFICANT CHANGE UP (ref 232–1245)

## 2024-01-01 NOTE — ED ADULT NURSE NOTE - ALCOHOL PRE SCREEN (AUDIT - C)
2 attempts at PIV insertion by this RN, and 2 attempts by Gisel RN and 1 attempt by Ghazala RN. Blood culture sent to lab via tube station. Pt remains in warmer, mother feeding infant at this time.    Statement Selected

## 2024-03-07 ENCOUNTER — APPOINTMENT (OUTPATIENT)
Dept: OBGYN | Facility: CLINIC | Age: 29
End: 2024-03-07

## 2024-03-11 ENCOUNTER — APPOINTMENT (OUTPATIENT)
Dept: OBGYN | Facility: CLINIC | Age: 29
End: 2024-03-11
Payer: COMMERCIAL

## 2024-03-11 VITALS
SYSTOLIC BLOOD PRESSURE: 100 MMHG | HEIGHT: 67 IN | WEIGHT: 135 LBS | DIASTOLIC BLOOD PRESSURE: 60 MMHG | BODY MASS INDEX: 21.19 KG/M2

## 2024-03-11 DIAGNOSIS — Z01.419 ENCOUNTER FOR GYNECOLOGICAL EXAMINATION (GENERAL) (ROUTINE) W/OUT ABNORMAL FINDINGS: ICD-10-CM

## 2024-03-11 PROCEDURE — 99395 PREV VISIT EST AGE 18-39: CPT

## 2024-03-11 RX ORDER — NORETHINDRONE ACETATE AND ETHINYL ESTRADIOL 1MG-20(24)
1-20 KIT ORAL DAILY
Qty: 3 | Refills: 3 | Status: ACTIVE | COMMUNITY
Start: 2019-11-01 | End: 1900-01-01

## 2024-03-11 NOTE — DISCUSSION/SUMMARY
[FreeTextEntry1] : no smoking, RBAD . Discussed the risks of DVT and blood clots,strokes  good and bad side effects of the pill discussed and instructions on how to take pills and when to use back up. Encouraged exercise , good diet filled with,plant based foods, calcium and vit.D.rtn 6 months. Discussed SBE, Discussed the NIH suggests minimum of 2.5 hours of exercise a week  Answered any questions she may have.

## 2024-03-11 NOTE — HISTORY OF PRESENT ILLNESS
[FreeTextEntry1] : 29 yo  doing well on her meds she is on blisovi and doing well,  disc dexa , she is on PPi [Currently Active] : currently active [Vaginal] : vaginal [Men] : men

## 2024-03-12 LAB — HPV HIGH+LOW RISK DNA PNL CVX: NOT DETECTED

## 2024-03-19 DIAGNOSIS — B37.31 ACUTE CANDIDIASIS OF VULVA AND VAGINA: ICD-10-CM

## 2024-03-19 LAB — CYTOLOGY CVX/VAG DOC THIN PREP: ABNORMAL

## 2024-05-29 ENCOUNTER — TRANSCRIPTION ENCOUNTER (OUTPATIENT)
Age: 29
End: 2024-05-29

## 2024-06-25 ENCOUNTER — APPOINTMENT (OUTPATIENT)
Dept: PLASTIC SURGERY | Facility: CLINIC | Age: 29
End: 2024-06-25

## 2024-06-25 ENCOUNTER — NON-APPOINTMENT (OUTPATIENT)
Age: 29
End: 2024-06-25

## 2024-06-25 VITALS
DIASTOLIC BLOOD PRESSURE: 69 MMHG | BODY MASS INDEX: 21.19 KG/M2 | OXYGEN SATURATION: 100 % | TEMPERATURE: 99.2 F | SYSTOLIC BLOOD PRESSURE: 108 MMHG | HEART RATE: 107 BPM | WEIGHT: 135 LBS | HEIGHT: 67 IN

## 2024-06-25 DIAGNOSIS — N64.4 MASTODYNIA: ICD-10-CM

## 2024-06-25 PROCEDURE — 99243 OFF/OP CNSLTJ NEW/EST LOW 30: CPT

## 2024-06-28 ENCOUNTER — NON-APPOINTMENT (OUTPATIENT)
Age: 29
End: 2024-06-28

## 2024-07-02 DIAGNOSIS — Z01.419 ENCOUNTER FOR GYNECOLOGICAL EXAMINATION (GENERAL) (ROUTINE) W/OUT ABNORMAL FINDINGS: ICD-10-CM

## 2024-07-02 DIAGNOSIS — B00.9 HERPESVIRAL INFECTION, UNSPECIFIED: ICD-10-CM

## 2024-07-02 DIAGNOSIS — Z87.440 PERSONAL HISTORY OF URINARY (TRACT) INFECTIONS: ICD-10-CM

## 2024-07-02 DIAGNOSIS — B96.89 ACUTE VAGINITIS: ICD-10-CM

## 2024-07-02 DIAGNOSIS — N76.0 ACUTE VAGINITIS: ICD-10-CM

## 2024-07-02 DIAGNOSIS — Z87.898 PERSONAL HISTORY OF OTHER SPECIFIED CONDITIONS: ICD-10-CM

## 2024-07-02 DIAGNOSIS — B99.9 UNSPECIFIED INFECTIOUS DISEASE: ICD-10-CM

## 2024-07-02 DIAGNOSIS — Z87.2 PERSONAL HISTORY OF DISEASES OF THE SKIN AND SUBCUTANEOUS TISSUE: ICD-10-CM

## 2024-07-02 DIAGNOSIS — Z11.3 ENCOUNTER FOR SCREENING FOR INFECTIONS WITH A PREDOMINANTLY SEXUAL MODE OF TRANSMISSION: ICD-10-CM

## 2024-07-02 DIAGNOSIS — R10.11 RIGHT UPPER QUADRANT PAIN: ICD-10-CM

## 2024-07-02 DIAGNOSIS — Z86.59 PERSONAL HISTORY OF OTHER MENTAL AND BEHAVIORAL DISORDERS: ICD-10-CM

## 2024-07-02 DIAGNOSIS — Z01.818 ENCOUNTER FOR OTHER PREPROCEDURAL EXAMINATION: ICD-10-CM

## 2024-07-02 DIAGNOSIS — Z20.2 CONTACT WITH AND (SUSPECTED) EXPOSURE TO INFECTIONS WITH A PREDOMINANTLY SEXUAL MODE OF TRANSMISSION: ICD-10-CM

## 2024-07-02 DIAGNOSIS — Z92.29 PERSONAL HISTORY OF OTHER DRUG THERAPY: ICD-10-CM

## 2024-07-02 DIAGNOSIS — Z87.42 PERSONAL HISTORY OF OTHER DISEASES OF THE FEMALE GENITAL TRACT: ICD-10-CM

## 2024-07-02 PROBLEM — E53.8 B12 DEFICIENCY: Status: ACTIVE | Noted: 2021-10-11

## 2024-07-02 RX ORDER — NEEDLES, DISPOSABLE 25GX5/8"
27G X 1/2" NEEDLE, DISPOSABLE MISCELLANEOUS
Qty: 10 | Refills: 0 | Status: ACTIVE | COMMUNITY
Start: 2024-07-02 | End: 1900-01-01

## 2024-07-02 RX ORDER — SYRINGE WITH NEEDLE, 1 ML 25GX5/8"
22G X 1-1/2" SYRINGE, EMPTY DISPOSABLE MISCELLANEOUS
Qty: 10 | Refills: 0 | Status: ACTIVE | COMMUNITY
Start: 2024-07-02 | End: 1900-01-01

## 2024-07-03 ENCOUNTER — NON-APPOINTMENT (OUTPATIENT)
Age: 29
End: 2024-07-03

## 2024-07-03 PROBLEM — Z98.82 HISTORY OF BREAST AUGMENTATION: Status: ACTIVE | Noted: 2024-07-03

## 2024-07-08 ENCOUNTER — APPOINTMENT (OUTPATIENT)
Dept: MAMMOGRAPHY | Facility: CLINIC | Age: 29
End: 2024-07-08
Payer: COMMERCIAL

## 2024-07-08 ENCOUNTER — OUTPATIENT (OUTPATIENT)
Dept: OUTPATIENT SERVICES | Facility: HOSPITAL | Age: 29
LOS: 1 days | End: 2024-07-08
Payer: COMMERCIAL

## 2024-07-08 ENCOUNTER — APPOINTMENT (OUTPATIENT)
Dept: ULTRASOUND IMAGING | Facility: CLINIC | Age: 29
End: 2024-07-08
Payer: COMMERCIAL

## 2024-07-08 ENCOUNTER — RESULT REVIEW (OUTPATIENT)
Age: 29
End: 2024-07-08

## 2024-07-08 DIAGNOSIS — Z98.84 BARIATRIC SURGERY STATUS: Chronic | ICD-10-CM

## 2024-07-08 DIAGNOSIS — Z98.890 OTHER SPECIFIED POSTPROCEDURAL STATES: Chronic | ICD-10-CM

## 2024-07-08 DIAGNOSIS — N64.4 MASTODYNIA: ICD-10-CM

## 2024-07-08 DIAGNOSIS — Z90.3 ACQUIRED ABSENCE OF STOMACH [PART OF]: Chronic | ICD-10-CM

## 2024-07-08 PROCEDURE — 76641 ULTRASOUND BREAST COMPLETE: CPT | Mod: 26,50

## 2024-07-08 PROCEDURE — 76641 ULTRASOUND BREAST COMPLETE: CPT

## 2024-07-09 ENCOUNTER — NON-APPOINTMENT (OUTPATIENT)
Age: 29
End: 2024-07-09

## 2024-07-10 ENCOUNTER — OUTPATIENT (OUTPATIENT)
Dept: OUTPATIENT SERVICES | Facility: HOSPITAL | Age: 29
LOS: 1 days | Discharge: HOME | End: 2024-07-10

## 2024-07-10 DIAGNOSIS — Z98.890 OTHER SPECIFIED POSTPROCEDURAL STATES: Chronic | ICD-10-CM

## 2024-07-10 DIAGNOSIS — Z90.3 ACQUIRED ABSENCE OF STOMACH [PART OF]: Chronic | ICD-10-CM

## 2024-07-10 DIAGNOSIS — K95.89 OTHER COMPLICATIONS OF OTHER BARIATRIC PROCEDURE: ICD-10-CM

## 2024-07-10 DIAGNOSIS — Z98.84 BARIATRIC SURGERY STATUS: ICD-10-CM

## 2024-07-10 DIAGNOSIS — D50.9 IRON DEFICIENCY ANEMIA, UNSPECIFIED: ICD-10-CM

## 2024-07-10 DIAGNOSIS — Z98.84 BARIATRIC SURGERY STATUS: Chronic | ICD-10-CM

## 2024-07-10 DIAGNOSIS — N92.0 EXCESSIVE AND FREQUENT MENSTRUATION WITH REGULAR CYCLE: ICD-10-CM

## 2024-07-10 DIAGNOSIS — E53.8 DEFICIENCY OF OTHER SPECIFIED B GROUP VITAMINS: ICD-10-CM

## 2024-07-10 NOTE — PATIENT PROFILE ADULT - NSPRONUTRITIONRISK_GEN_A_NUR
No indicators present Spoke to patient, she's under a lot of stress, spoke to her Therapist, think she may have high blood pressure, pt will go to UC afterwork and keep , will start drinking more fluids, urine dark

## 2024-07-11 ENCOUNTER — APPOINTMENT (OUTPATIENT)
Dept: HEMATOLOGY ONCOLOGY | Facility: CLINIC | Age: 29
End: 2024-07-11
Payer: COMMERCIAL

## 2024-07-11 ENCOUNTER — APPOINTMENT (OUTPATIENT)
Dept: INFUSION THERAPY | Facility: CLINIC | Age: 29
End: 2024-07-11
Payer: COMMERCIAL

## 2024-07-11 DIAGNOSIS — K95.89 OTHER COMPLICATIONS OF OTHER BARIATRIC PROCEDURE: ICD-10-CM

## 2024-07-11 DIAGNOSIS — R41.89 OTHER SYMPTOMS AND SIGNS INVOLVING COGNITIVE FUNCTIONS AND AWARENESS: ICD-10-CM

## 2024-07-11 DIAGNOSIS — Z87.19 PERSONAL HISTORY OF OTHER DISEASES OF THE DIGESTIVE SYSTEM: ICD-10-CM

## 2024-07-11 DIAGNOSIS — E53.8 DEFICIENCY OF OTHER SPECIFIED B GROUP VITAMINS: ICD-10-CM

## 2024-07-11 DIAGNOSIS — Z98.890 OTHER SPECIFIED POSTPROCEDURAL STATES: ICD-10-CM

## 2024-07-11 DIAGNOSIS — Z86.19 PERSONAL HISTORY OF OTHER INFECTIOUS AND PARASITIC DISEASES: ICD-10-CM

## 2024-07-11 DIAGNOSIS — K21.9 GASTRO-ESOPHAGEAL REFLUX DISEASE W/OUT ESOPHAGITIS: ICD-10-CM

## 2024-07-11 DIAGNOSIS — Z98.84 BARIATRIC SURGERY STATUS: ICD-10-CM

## 2024-07-11 DIAGNOSIS — D50.8 OTHER COMPLICATIONS OF OTHER BARIATRIC PROCEDURE: ICD-10-CM

## 2024-07-11 PROCEDURE — 99213 OFFICE O/P EST LOW 20 MIN: CPT

## 2024-07-11 PROCEDURE — G2211 COMPLEX E/M VISIT ADD ON: CPT

## 2024-07-16 ENCOUNTER — RX RENEWAL (OUTPATIENT)
Age: 29
End: 2024-07-16

## 2024-07-16 RX ORDER — CYANOCOBALAMIN 1000 UG/ML
1000 INJECTION INTRAMUSCULAR; SUBCUTANEOUS
Qty: 6 | Refills: 3 | Status: ACTIVE | COMMUNITY
Start: 2024-07-02 | End: 1900-01-01

## 2024-07-19 ENCOUNTER — APPOINTMENT (OUTPATIENT)
Dept: INFUSION THERAPY | Facility: CLINIC | Age: 29
End: 2024-07-19

## 2024-07-31 ENCOUNTER — APPOINTMENT (OUTPATIENT)
Dept: PSYCHIATRY | Facility: CLINIC | Age: 29
End: 2024-07-31
Payer: COMMERCIAL

## 2024-07-31 PROCEDURE — 99214 OFFICE O/P EST MOD 30 MIN: CPT | Mod: 95

## 2024-07-31 NOTE — SOCIAL HISTORY
[Alone] : lives alone [Employed] : employed [Never ] : never  [College] : College [None] : none [FreeTextEntry1] : Social History:  \par  \par  Born and raised: Born and raised in [facility]. \par  \par  Siblings: She is the older of 4 sibling, 2 are half sibling\par  \par  Parents:   when pt was 2 y/o . Father has his own [employer] business, mom work in a construction company. \par  \par  Childhood:  was traumatic. Mom has been in and out of Psychiatric hospitals. Pt and her brother lived with dad, had supervised visits with mom, had to go for court mandated therapy. . Dad remarried someone very young. Pt did not have a good relationship with step mom. When in  both pt and her brother started to live with her mother. \par  \par  Education:  was an average student, she was given her mom's adderall and she did had bad SE. She had focus and attention issues but was not diagnosed in . She went to college at [employer] did her Bachelors in Psychology. Started seeing her mothers psychiatrist and was Rxed Ritalin that helped with focus and attention. Did her BSN from [place]. 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 \par  \par  Relationship with parents: with dad  is ok now, with mom it is complicated and she tries to  keep a distance\par  \par  Relationship with siblings: good \par  \par  Romantic relationship/marriage:  \par  \par   \par  \par  Legal Hx: Denies \par  \par   \par  \par  Access to firearms: Denies

## 2024-07-31 NOTE — HISTORY OF PRESENT ILLNESS
[de-identified] : The following service was provided using telehealth between writer/provider and patient. The patient was at home. The writer was at clinic. Patient was alone and consented to telehealth format. All treatment plans through and including today's date were reviewed with the patient.  Patient is here for 6 month follow-up visit via telehealth  Last seen in Dec 2023. No medication changes at that time. State she is doing great. Starting a new job next week in the OR.   Mood: better. Anxiety and depression are more manageable.  Sleep: wakes up at 2 am but is able to go back to sleep. 6-7 hours broken.  Appetite: better. Has been eating. Wt 140 lbs Energy: decreased. Diagnosed with LEANNA and B12 deficiency. doing Iron infusions.  Concentration: good with Strattera Motivation: good Denies any AVH, SI or HI. Last drink was 7/20/2021. Brother also celebrated 8 months of sobriety.  Going to 3-4 meetings per week and individual with Shonda Bourgeois at [facility] once a month. +Sponsor  Denies any cravings, urges or relapse. No Vivitrol.  Gave her a therapist number for her body dysmorphia.   [No] : no [de-identified] : Intake:    25 y/o  seen today for intake and initial MH evaluation. 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 Pt lives alone, her family is from [place]\par  \par   \par  \par  Chief compliant: Establish care and medication management.\par  \par   \par  \par  History of present illness: Pt started treatment for anxiety/depression at age 13.  She struggled with body dysmorphic disorder. Was on several medications but never been adherent to it. Was on/off medications. 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 She started to self medicate with alcohol and started to abuse it. Her wake up call was when she went to work intoxicated and was pulled aside. Pt started Rehab in July of 2021 for AUD, for a month. Pt is sober for 10 months. Attends AA meetings and has a sponsor. 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 She is looking  to switch providers because of change of insurance. \par  \par  \par   \par  \par  Depression: Pt denies sx consistent with MDD. "I have not felt depressed since I have been sober."  Sleep is 'better now' Appetite is good. She struggles with body image. Constantly feels that she is over wt. Had bariatric surgery at age 16 and has lost 100 lbs since and has been able to maintain it . Denies SI/intent or HI\par  \par   \par  \par  Susannah/Hypomania:  Denies episodes in the recent or remote past \par  \par   \par  \par  Anxiety:  Anxiety has improved , she use to have 'crippling anxiety' during COVID and often self medicated with alcohol Denies Panic attacks or sx consistent with OCD/PTSD \par  \par   \par  \par  Eating Disorder: Denies any  current symptoms. Pt was an emotional eater,  used to purge after stuffing herself.  Last done was 2 years ago. \par  \par   \par  \par  Psychosis: Denies AH/VH, does not illicit any delusional thoughts during this assessment. \par  \par

## 2024-07-31 NOTE — DISCUSSION/SUMMARY
[FreeTextEntry1] : Assessment: Patient is a 26 yo female with h/o depression anxiety and alcohol use disorder seen today for medication management. Patient is compliant with their medications, tolerating it well without any side effects. I-STOP was checked without any problems.   Plan:   No ANXIETY COVERAGE D/C Prozac 10 mg. PO QD for depression and anxiety.  Continue Wellbutrin  PO QAM for depression, low motivation, energy, concentration and decrease SSRI induced sexual dysfunction.  Continue Strattera 100 mg QD for ADHD D/C Trazodone 50 mg QHS ( No refill requested)  Alternative strategies including no intervention discussed with patient. Patient consents to current medications as prescribed. - Patient understands to contact clinic prn with concerns and agrees to call 911 or go to nearest ER if symptoms worsen. - Next appointment made in 3 month. Patient left the office without any distress.

## 2024-08-20 ENCOUNTER — TRANSCRIPTION ENCOUNTER (OUTPATIENT)
Age: 29
End: 2024-08-20

## 2024-08-26 ENCOUNTER — APPOINTMENT (OUTPATIENT)
Dept: INFUSION THERAPY | Facility: CLINIC | Age: 29
End: 2024-08-26

## 2024-09-16 ENCOUNTER — APPOINTMENT (OUTPATIENT)
Dept: OBGYN | Facility: CLINIC | Age: 29
End: 2024-09-16

## 2024-09-17 ENCOUNTER — OUTPATIENT (OUTPATIENT)
Dept: OUTPATIENT SERVICES | Facility: HOSPITAL | Age: 29
LOS: 1 days | Discharge: HOME | End: 2024-09-17

## 2024-09-17 DIAGNOSIS — Z98.890 OTHER SPECIFIED POSTPROCEDURAL STATES: Chronic | ICD-10-CM

## 2024-09-17 DIAGNOSIS — K95.89 OTHER COMPLICATIONS OF OTHER BARIATRIC PROCEDURE: ICD-10-CM

## 2024-09-17 DIAGNOSIS — Z98.84 BARIATRIC SURGERY STATUS: Chronic | ICD-10-CM

## 2024-09-17 DIAGNOSIS — E53.8 DEFICIENCY OF OTHER SPECIFIED B GROUP VITAMINS: ICD-10-CM

## 2024-09-17 DIAGNOSIS — N92.0 EXCESSIVE AND FREQUENT MENSTRUATION WITH REGULAR CYCLE: ICD-10-CM

## 2024-09-17 DIAGNOSIS — D50.9 IRON DEFICIENCY ANEMIA, UNSPECIFIED: ICD-10-CM

## 2024-09-17 DIAGNOSIS — Z90.3 ACQUIRED ABSENCE OF STOMACH [PART OF]: Chronic | ICD-10-CM

## 2024-09-19 ENCOUNTER — APPOINTMENT (OUTPATIENT)
Dept: OBGYN | Facility: CLINIC | Age: 29
End: 2024-09-19

## 2024-09-23 ENCOUNTER — APPOINTMENT (OUTPATIENT)
Dept: HEMATOLOGY ONCOLOGY | Facility: CLINIC | Age: 29
End: 2024-09-23

## 2024-09-26 ENCOUNTER — NON-APPOINTMENT (OUTPATIENT)
Age: 29
End: 2024-09-26

## 2024-09-27 LAB
ERYTHROCYTE [SEDIMENTATION RATE] IN BLOOD BY WESTERGREN METHOD: 3 MM/HR
FERRITIN SERPL-MCNC: 423 NG/ML
FOLATE SERPL-MCNC: 7.8 NG/ML
IRON SATN MFR SERPL: 20 %
IRON SERPL-MCNC: 60 UG/DL
TIBC SERPL-MCNC: 302 UG/DL
UIBC SERPL-MCNC: 242 UG/DL
VIT B12 SERPL-MCNC: 560 PG/ML

## 2024-10-17 ENCOUNTER — APPOINTMENT (OUTPATIENT)
Dept: OBGYN | Facility: CLINIC | Age: 29
End: 2024-10-17
Payer: COMMERCIAL

## 2024-10-17 VITALS
WEIGHT: 135 LBS | SYSTOLIC BLOOD PRESSURE: 114 MMHG | HEIGHT: 67 IN | BODY MASS INDEX: 21.19 KG/M2 | RESPIRATION RATE: 18 BRPM | HEART RATE: 80 BPM | DIASTOLIC BLOOD PRESSURE: 66 MMHG

## 2024-10-17 DIAGNOSIS — Z30.41 ENCOUNTER FOR SURVEILLANCE OF CONTRACEPTIVE PILLS: ICD-10-CM

## 2024-10-17 DIAGNOSIS — N64.4 MASTODYNIA: ICD-10-CM

## 2024-10-17 PROCEDURE — 99213 OFFICE O/P EST LOW 20 MIN: CPT

## 2024-10-17 RX ORDER — NORETHINDRONE ACETATE AND ETHINYL ESTRADIOL 1MG-20(24)
1-20 KIT ORAL
Qty: 28 | Refills: 0 | Status: ACTIVE | COMMUNITY
Start: 2024-10-17 | End: 1900-01-01

## 2024-11-04 ENCOUNTER — APPOINTMENT (OUTPATIENT)
Dept: MRI IMAGING | Facility: CLINIC | Age: 29
End: 2024-11-04
Payer: COMMERCIAL

## 2024-11-04 ENCOUNTER — OUTPATIENT (OUTPATIENT)
Dept: OUTPATIENT SERVICES | Facility: HOSPITAL | Age: 29
LOS: 1 days | End: 2024-11-04
Payer: COMMERCIAL

## 2024-11-04 DIAGNOSIS — Z98.890 OTHER SPECIFIED POSTPROCEDURAL STATES: Chronic | ICD-10-CM

## 2024-11-04 DIAGNOSIS — N64.4 MASTODYNIA: ICD-10-CM

## 2024-11-04 DIAGNOSIS — Z98.84 BARIATRIC SURGERY STATUS: Chronic | ICD-10-CM

## 2024-11-04 DIAGNOSIS — Z90.3 ACQUIRED ABSENCE OF STOMACH [PART OF]: Chronic | ICD-10-CM

## 2024-11-04 PROCEDURE — C8908: CPT

## 2024-11-04 PROCEDURE — 77049 MRI BREAST C-+ W/CAD BI: CPT | Mod: 26

## 2024-11-04 PROCEDURE — A9585: CPT

## 2024-11-04 PROCEDURE — C8937: CPT

## 2024-11-14 NOTE — H&P ADULT - NSHPPOADEEPVENOUSTHROMB_GEN_A_CORE
Anesthesia Post-op Note    Patient: Debra Jean Holterman  Procedure(s) Performed: left first MPJ fusion and left 5th metatarsal head resection (Left: Foot)  left first MPJ fusion adn left 5th metatarsal head resection (Left: Foot)  Anesthesia type: General    Vitals Value Taken Time   Temp 36.3 °C (97.4 °F) 11/14/24 1149   Pulse 80 11/14/24 1149   Resp 16 11/14/24 1149   SpO2 97 % 11/14/24 1149   /65 11/14/24 1149         Post-op Vital Signs:stable  Level of Consciousness: awake and alert  Respiratory Status: spontaneous ventilation  Cardiovascular stable  Hydration: euvolemic  Pain Management: adequately controlled  Handoff: Handoff to receiving clinician was performed and questions were answered  Vomiting: none  Nausea: None  Airway Patency:patent  Post-op Assessment: no complications and patient tolerated procedure well      No notable events documented.                       no

## 2024-11-18 ENCOUNTER — APPOINTMENT (OUTPATIENT)
Dept: MRI IMAGING | Facility: CLINIC | Age: 29
End: 2024-11-18

## 2024-11-19 ENCOUNTER — APPOINTMENT (OUTPATIENT)
Dept: MRI IMAGING | Facility: CLINIC | Age: 29
End: 2024-11-19

## 2024-11-20 PROBLEM — T85.44XA CAPSULAR CONTRACTURE OF BREAST IMPLANT, INITIAL ENCOUNTER: Status: ACTIVE | Noted: 2024-11-20

## 2024-11-20 PROBLEM — T85.848A PAIN FROM BREAST IMPLANT: Status: ACTIVE | Noted: 2024-11-20

## 2024-11-21 ENCOUNTER — APPOINTMENT (OUTPATIENT)
Dept: PLASTIC SURGERY | Facility: CLINIC | Age: 29
End: 2024-11-21

## 2024-11-21 VITALS
WEIGHT: 132 LBS | OXYGEN SATURATION: 100 % | BODY MASS INDEX: 20.72 KG/M2 | SYSTOLIC BLOOD PRESSURE: 124 MMHG | HEART RATE: 96 BPM | DIASTOLIC BLOOD PRESSURE: 79 MMHG | HEIGHT: 67 IN

## 2024-11-21 DIAGNOSIS — T85.44XA CAPSULAR CONTRACTURE OF BREAST IMPLANT, INITIAL ENCOUNTER: ICD-10-CM

## 2024-11-21 DIAGNOSIS — T85.848A PAIN DUE TO OTHER INTERNAL PROSTHETIC DEVICES, IMPLANTS AND GRAFTS, INITIAL ENCOUNTER: ICD-10-CM

## 2024-11-21 PROCEDURE — 99204 OFFICE O/P NEW MOD 45 MIN: CPT

## 2025-01-17 ENCOUNTER — TRANSCRIPTION ENCOUNTER (OUTPATIENT)
Age: 30
End: 2025-01-17

## 2025-01-21 ENCOUNTER — TRANSCRIPTION ENCOUNTER (OUTPATIENT)
Age: 30
End: 2025-01-21

## 2025-01-29 ENCOUNTER — APPOINTMENT (OUTPATIENT)
Dept: PSYCHIATRY | Facility: CLINIC | Age: 30
End: 2025-01-29
Payer: COMMERCIAL

## 2025-01-29 PROCEDURE — 99214 OFFICE O/P EST MOD 30 MIN: CPT | Mod: 95

## 2025-01-29 RX ORDER — ESCITALOPRAM OXALATE 5 MG/1
5 TABLET ORAL DAILY
Qty: 5 | Refills: 0 | Status: ACTIVE | COMMUNITY
Start: 2025-01-29 | End: 1900-01-01

## 2025-01-29 RX ORDER — ESCITALOPRAM OXALATE 10 MG/1
10 TABLET ORAL
Qty: 30 | Refills: 1 | Status: ACTIVE | COMMUNITY
Start: 2025-01-29 | End: 1900-01-01

## 2025-03-24 ENCOUNTER — APPOINTMENT (OUTPATIENT)
Dept: OBGYN | Facility: CLINIC | Age: 30
End: 2025-03-24
Payer: COMMERCIAL

## 2025-03-24 ENCOUNTER — NON-APPOINTMENT (OUTPATIENT)
Age: 30
End: 2025-03-24

## 2025-03-24 VITALS
DIASTOLIC BLOOD PRESSURE: 72 MMHG | SYSTOLIC BLOOD PRESSURE: 120 MMHG | BODY MASS INDEX: 20.56 KG/M2 | WEIGHT: 131 LBS | HEIGHT: 67 IN

## 2025-03-24 DIAGNOSIS — Z01.419 ENCOUNTER FOR GYNECOLOGICAL EXAMINATION (GENERAL) (ROUTINE) W/OUT ABNORMAL FINDINGS: ICD-10-CM

## 2025-03-24 DIAGNOSIS — Z11.3 ENCOUNTER FOR SCREENING FOR INFECTIONS WITH A PREDOMINANTLY SEXUAL MODE OF TRANSMISSION: ICD-10-CM

## 2025-03-24 PROCEDURE — 99395 PREV VISIT EST AGE 18-39: CPT

## 2025-03-25 LAB
C TRACH RRNA SPEC QL NAA+PROBE: NOT DETECTED
HBV SURFACE AG SER QL: NONREACTIVE
HCV AB SER QL: NONREACTIVE
HCV S/CO RATIO: 0.06 S/CO
HIV1+2 AB SPEC QL IA.RAPID: NONREACTIVE
N GONORRHOEA RRNA SPEC QL NAA+PROBE: NOT DETECTED
SOURCE AMPLIFICATION: NORMAL
T PALLIDUM AB SER QL IA: NEGATIVE

## 2025-03-27 ENCOUNTER — OUTPATIENT (OUTPATIENT)
Dept: OUTPATIENT SERVICES | Facility: HOSPITAL | Age: 30
LOS: 1 days | Discharge: HOME | End: 2025-03-27

## 2025-03-27 DIAGNOSIS — Z98.84 BARIATRIC SURGERY STATUS: Chronic | ICD-10-CM

## 2025-03-27 DIAGNOSIS — Z98.890 OTHER SPECIFIED POSTPROCEDURAL STATES: Chronic | ICD-10-CM

## 2025-03-27 DIAGNOSIS — Z90.3 ACQUIRED ABSENCE OF STOMACH [PART OF]: Chronic | ICD-10-CM

## 2025-03-27 DIAGNOSIS — D50.9 IRON DEFICIENCY ANEMIA, UNSPECIFIED: ICD-10-CM

## 2025-04-01 ENCOUNTER — TRANSCRIPTION ENCOUNTER (OUTPATIENT)
Age: 30
End: 2025-04-01

## 2025-04-02 ENCOUNTER — RESULT REVIEW (OUTPATIENT)
Age: 30
End: 2025-04-02

## 2025-04-02 ENCOUNTER — APPOINTMENT (OUTPATIENT)
Dept: HEMATOLOGY ONCOLOGY | Facility: CLINIC | Age: 30
End: 2025-04-02

## 2025-04-02 LAB
BASOPHILS # BLD AUTO: 0.06 K/UL — SIGNIFICANT CHANGE UP (ref 0–0.2)
BASOPHILS NFR BLD AUTO: 1 % — SIGNIFICANT CHANGE UP (ref 0–2)
EOSINOPHIL # BLD AUTO: 0.09 K/UL — SIGNIFICANT CHANGE UP (ref 0–0.5)
EOSINOPHIL NFR BLD AUTO: 1.6 % — SIGNIFICANT CHANGE UP (ref 0–6)
HCT VFR BLD CALC: 36.3 % — SIGNIFICANT CHANGE UP (ref 34.5–45)
HGB BLD-MCNC: 12.1 G/DL — SIGNIFICANT CHANGE UP (ref 11.5–15.5)
IMM GRANULOCYTES NFR BLD AUTO: 0.2 % — SIGNIFICANT CHANGE UP (ref 0–0.9)
LYMPHOCYTES # BLD AUTO: 1.77 K/UL — SIGNIFICANT CHANGE UP (ref 1–3.3)
LYMPHOCYTES # BLD AUTO: 30.5 % — SIGNIFICANT CHANGE UP (ref 13–44)
MCHC RBC-ENTMCNC: 30.2 PG — SIGNIFICANT CHANGE UP (ref 27–34)
MCHC RBC-ENTMCNC: 33.3 G/DL — SIGNIFICANT CHANGE UP (ref 32–36)
MCV RBC AUTO: 90.5 FL — SIGNIFICANT CHANGE UP (ref 80–100)
MONOCYTES # BLD AUTO: 0.45 K/UL — SIGNIFICANT CHANGE UP (ref 0–0.9)
MONOCYTES NFR BLD AUTO: 7.8 % — SIGNIFICANT CHANGE UP (ref 2–14)
NEUTROPHILS # BLD AUTO: 3.42 K/UL — SIGNIFICANT CHANGE UP (ref 1.8–7.4)
NEUTROPHILS NFR BLD AUTO: 58.9 % — SIGNIFICANT CHANGE UP (ref 43–77)
NRBC BLD AUTO-RTO: 0 /100 WBCS — SIGNIFICANT CHANGE UP (ref 0–0)
PLATELET # BLD AUTO: 249 K/UL — SIGNIFICANT CHANGE UP (ref 150–400)
RBC # BLD: 4.01 M/UL — SIGNIFICANT CHANGE UP (ref 3.8–5.2)
RBC # FLD: 11.6 % — SIGNIFICANT CHANGE UP (ref 10.3–14.5)
WBC # BLD: 5.8 K/UL — SIGNIFICANT CHANGE UP (ref 3.8–10.5)
WBC # FLD AUTO: 5.8 K/UL — SIGNIFICANT CHANGE UP (ref 3.8–10.5)

## 2025-04-03 LAB
ERYTHROCYTE [SEDIMENTATION RATE] IN BLOOD BY WESTERGREN METHOD: 2 MM/HR
FERRITIN SERPL-MCNC: 217 NG/ML
IRON SATN MFR SERPL: 26 %
IRON SERPL-MCNC: 76 UG/DL
TIBC SERPL-MCNC: 288 UG/DL
UIBC SERPL-MCNC: 212 UG/DL

## 2025-04-09 ENCOUNTER — APPOINTMENT (OUTPATIENT)
Dept: PSYCHIATRY | Facility: CLINIC | Age: 30
End: 2025-04-09
Payer: COMMERCIAL

## 2025-04-09 PROCEDURE — 99214 OFFICE O/P EST MOD 30 MIN: CPT | Mod: 95

## 2025-04-09 RX ORDER — ESCITALOPRAM OXALATE 5 MG/1
5 TABLET ORAL
Qty: 90 | Refills: 0 | Status: ACTIVE | COMMUNITY
Start: 2025-04-09 | End: 1900-01-01

## 2025-04-09 RX ORDER — ESCITALOPRAM OXALATE 10 MG/1
10 TABLET ORAL
Qty: 90 | Refills: 0 | Status: ACTIVE | COMMUNITY
Start: 2025-04-09 | End: 1900-01-01

## 2025-04-09 RX ORDER — ESCITALOPRAM OXALATE 10 MG/1
10 TABLET ORAL
Qty: 30 | Refills: 0 | Status: ACTIVE | COMMUNITY
Start: 2025-04-09 | End: 1900-01-01

## 2025-06-23 NOTE — CONSULT NOTE ADULT - SUBJECTIVE AND OBJECTIVE BOX
GENERAL SURGERY CONSULT NOTE    Patient is a 27y old  Female who presents with a chief complaint of abdominal pain s/p recent ex lap and RAIZA.     HPI: 26 yo F POD#4 of ex lap with RAIZA presented to ED c/o abd pain, nausea. Pshx of sleeve to gastric bypass in . States abd pain changed from RUQ to midline from umbilicus to breast bone. Today pain 5/10 with pain meds. Describes pain as pressure, stabbing, and a sensation that there is something in her chest.  No vomiting today. +flatus and BM.       PAST MEDICAL & SURGICAL HISTORY:  Anxiety and depression  Chronic GERD  History of obesity  Migraines  Constipation  History of anemia  History of asthma  as a child  History of augmentation of both breasts  2014  History of sleeve gastrectomy  2012  S/P endoscopy  upper - 10/2019  S/P gastric bypass  2020  History of laparoscopy   for hiatal hernia repair, SBO and Lysis of adhesions      Review of Systems:  see above    MEDICATIONS  (STANDING):  lactated ringers. 1000 milliLiter(s) (125 mL/Hr) IV Continuous <Continuous>  pantoprazole  Injectable 40 milliGRAM(s) IV Push Once      Allergies  penicillins (Unknown)    SOCIAL HISTORY          Smoking: Yes [ ]  No [X ]   ______pk yrs          ETOH  Yes [ ]  No [ X]  Social [ ]          DRUGS:  Yes [ ]  No [ X]  if so what______________    FAMILY HISTORY:  Family history of anxiety disorder (Mother)  mother  Family history of depression (Mother)  mother  Family history of hyperlipidemia (Father)  father    Vital Signs Last 24 Hrs  T(C): 36.6 (2022 07:15), Max: 36.7 (2022 16:26)  T(F): 97.9 (2022 07:15), Max: 98 (2022 16:26)  HR: 57 (2022 07:15) (50 - 106)  BP: 141/89 (2022 07:15) (111/64 - 145/85)  BP(mean): --  RR: 16 (2022 07:15) (16 - 20)  SpO2: 99% (2022 07:15) (98% - 99%)    Parameters below as of 2022 07:15  Patient On (Oxygen Delivery Method): room air    Physical Exam:  General:  Appears stated age, well-groomed, well-nourished  Eyes : CARI  HENT:  WNL, no JVD  Chest: equal chest expansion  Cardiovascular:  pulse regular  Abdomen: incisions c/d/i. Derma oropeza present. Abd soft, non distended. Mild epigastric tenderness. No guarding.   Extremities:  no swelling. Symmetrical  Neuro/Psych:  Alert, oriented tp time, place and person       LABS:                        13.2   9.55  )-----------( 289      ( 2022 16:55 )             40.0         138  |  101  |  9   ----------------------------<  115<H>  3.6   |  24  |  0.92    Ca    9.3      2022 16:55    TPro  7.6  /  Alb  3.8  /  TBili  0.3  /  DBili  x   /  AST  31  /  ALT  35  /  AlkPhos  65      PT/INR - ( 2022 17:40 )   PT: 12.1 sec;   INR: 1.05 ratio         PTT - ( 2022 17:40 )  PTT:27.4 sec  Urinalysis Basic - ( 2022 18:10 )    Color: Yellow / Appearance: Clear / S.010 / pH: x  Gluc: x / Ketone: Moderate  / Bili: Negative / Urobili: Negative mg/dL   Blood: x / Protein: Negative mg/dL / Nitrite: Negative   Leuk Esterase: Negative / RBC: x / WBC x   Sq Epi: x / Non Sq Epi: x / Bacteria: x    RADIOLOGY & ADDITIONAL STUDIES:  < from: CT Abdomen and Pelvis w/ Oral Cont and w/ IV Cont (22 @ 19:59) >                      PROCEDURE DATE:  2022    INTERPRETATION:  CLINICAL INFORMATION: Abdominal pain. Recent lysis of   adhesions.  COMPARISON: CT abdomen pelvis 10/10/2022  CONTRAST/COMPLICATIONS:  IV Contrast: Omnipaque 350  90 cc administered   10 cc discarded  Oral Contrast: Smoothie Readi-Cat 2  Complications: None reported at time of study completion  PROCEDURE:  CT of the Abdomen and Pelvis was performed.  Sagittal andcoronal reformats were performed.  FINDINGS:  LOWER CHEST: Partially imaged breast implants. Small hiatal hernia.  LIVER: Within normal limits.  BILE DUCTS: Normal caliber.  GALLBLADDER: Within normal limits.  SPLEEN: Within normal limits.  PANCREAS: Within normal limits.  ADRENALS: Within normal limits.  KIDNEYS/URETERS: Subcentimeter stable hypodense right renal lesion,   statistically likely representing a cyst..    BLADDER: Within normal limits.  REPRODUCTIVE ORGANS: Routine is enhancement of uterus. Left adnexal cyst   measures 1.6 cm.    BOWEL: Status post gastric bypass. JJ anastomosis in the left upper   quadrant, similar to prior study. No bowel obstruction or evidence of   internal hernia. Normal appendix.  PERITONEUM: No ascites.  VESSELS: Within normal limits.  RETROPERITONEUM/LYMPH NODES: No lymphadenopathy.  ABDOMINAL WALL: New thickening of the umbilicus. New right ventral   abdominal wall skin thickening and subjacent infiltration at the level of   the umbilicus. These findings likely relate to recent surgery.  BONES: Within normal limits.    IMPRESSION:  Status post gastric bypass. No bowel obstruction or evidence of internal   hernia.    Assessment/Plan  Patient is a 27y old  Female who presents with a chief complaint of abdominal pain s/p recent ex lap and RAIZA.   -[facility] Hida scan today  -protonix  -tylenol PO  -ICDs  -IVF  -Adv deit to bar clears once HIDA scan completed               [FreeTextEntry1] : 6 do  gaining weight, nursing  discussed feeding  transcut bili 14.5, nml for age, weight check 1 week GENERAL SURGERY CONSULT NOTE    Patient is a 27y old  Female who presents with a chief complaint of abdominal pain s/p recent ex lap and RAIZA.     HPI: 26 yo F POD#4 of ex lap with RAIZA presented to ED c/o abd pain, nausea. Pshx of sleeve to gastric bypass in . States abd pain changed from RUQ to midline from umbilicus to breast bone. Not associated with food. Today pain 5/10 with pain meds. Describes pain as pressure, stabbing, and a sensation that there is something in her chest.  No nausea or vomiting today. +flatus and BM.       PAST MEDICAL & SURGICAL HISTORY:  Anxiety and depression  Chronic GERD  History of obesity  Migraines  Constipation  History of anemia  History of asthma  as a child  History of augmentation of both breasts  2014  History of sleeve gastrectomy  2012  S/P endoscopy  upper - 10/2019  S/P gastric bypass  2020  History of laparoscopy   for hiatal hernia repair, SBO and Lysis of adhesions      Review of Systems:  see above    MEDICATIONS  (STANDING):  lactated ringers. 1000 milliLiter(s) (125 mL/Hr) IV Continuous <Continuous>  pantoprazole  Injectable 40 milliGRAM(s) IV Push Once      Allergies  penicillins (Unknown)    SOCIAL HISTORY          Smoking: Yes [ ]  No [X ]            ETOH  Yes [ ]  No [ X]  Social [ ]          DRUGS:  Yes [ ]  No [ X]     FAMILY HISTORY:  Family history of anxiety disorder (Mother)  mother  Family history of depression (Mother)  mother  Family history of hyperlipidemia (Father)  father    Vital Signs Last 24 Hrs  T(C): 36.6 (2022 07:15), Max: 36.7 (2022 16:26)  T(F): 97.9 (2022 07:15), Max: 98 (2022 16:26)  HR: 57 (2022 07:15) (50 - 106)  BP: 141/89 (2022 07:15) (111/64 - 145/85)  BP(mean): --  RR: 16 (2022 07:15) (16 - 20)  SpO2: 99% (2022 07:15) (98% - 99%)    Parameters below as of 2022 07:15  Patient On (Oxygen Delivery Method): room air    Physical Exam:  General:  Appears stated age, well-groomed, well-nourished  Eyes : CARI  HENT:  WNL, no JVD  Chest: equal chest expansion  Cardiovascular:  pulse regular  Abdomen: incisions c/d/i. Derma oropeza present. Abd soft, non distended. Mild epigastric tenderness. No guarding.   Extremities:  no swelling. Symmetrical  Neuro/Psych:  Alert, oriented tp time, place and person       LABS:                        13.2   9.55  )-----------( 289      ( 2022 16:55 )             40.0     11-    138  |  101  |  9   ----------------------------<  115<H>  3.6   |  24  |  0.92    Ca    9.3      2022 16:55    TPro  7.6  /  Alb  3.8  /  TBili  0.3  /  DBili  x   /  AST  31  /  ALT  35  /  AlkPhos  65  11-13    PT/INR - ( 2022 17:40 )   PT: 12.1 sec;   INR: 1.05 ratio         PTT - ( 2022 17:40 )  PTT:27.4 sec  Urinalysis Basic - ( 2022 18:10 )    Color: Yellow / Appearance: Clear / S.010 / pH: x  Gluc: x / Ketone: Moderate  / Bili: Negative / Urobili: Negative mg/dL   Blood: x / Protein: Negative mg/dL / Nitrite: Negative   Leuk Esterase: Negative / RBC: x / WBC x   Sq Epi: x / Non Sq Epi: x / Bacteria: x    RADIOLOGY & ADDITIONAL STUDIES:  < from: CT Abdomen and Pelvis w/ Oral Cont and w/ IV Cont (22 @ 19:59) >                      PROCEDURE DATE:  2022    INTERPRETATION:  CLINICAL INFORMATION: Abdominal pain. Recent lysis of   adhesions.  COMPARISON: CT abdomen pelvis 10/10/2022  CONTRAST/COMPLICATIONS:  IV Contrast: Omnipaque 350  90 cc administered   10 cc discarded  Oral Contrast: Smoothie Readi-Cat 2  Complications: None reported at time of study completion  PROCEDURE:  CT of the Abdomen and Pelvis was performed.  Sagittal andcoronal reformats were performed.  FINDINGS:  LOWER CHEST: Partially imaged breast implants. Small hiatal hernia.  LIVER: Within normal limits.  BILE DUCTS: Normal caliber.  GALLBLADDER: Within normal limits.  SPLEEN: Within normal limits.  PANCREAS: Within normal limits.  ADRENALS: Within normal limits.  KIDNEYS/URETERS: Subcentimeter stable hypodense right renal lesion,   statistically likely representing a cyst..    BLADDER: Within normal limits.  REPRODUCTIVE ORGANS: Routine is enhancement of uterus. Left adnexal cyst   measures 1.6 cm.    BOWEL: Status post gastric bypass. JJ anastomosis in the left upper   quadrant, similar to prior study. No bowel obstruction or evidence of   internal hernia. Normal appendix.  PERITONEUM: No ascites.  VESSELS: Within normal limits.  RETROPERITONEUM/LYMPH NODES: No lymphadenopathy.  ABDOMINAL WALL: New thickening of the umbilicus. New right ventral   abdominal wall skin thickening and subjacent infiltration at the level of   the umbilicus. These findings likely relate to recent surgery.  BONES: Within normal limits.    IMPRESSION:  Status post gastric bypass. No bowel obstruction or evidence of internal   hernia.    Assessment/Plan  Patient is a 27y old  Female who presents with a chief complaint of abdominal pain s/p recent ex lap and RAIZA.   -[facility] Hida scan today to rule out biliary dyskinesia  -protonix  -standing tylenol PO  -ICDs  -IVF  -Adv diet to beau clears once HIDA scan completed

## 2025-07-09 ENCOUNTER — APPOINTMENT (OUTPATIENT)
Dept: PSYCHIATRY | Facility: CLINIC | Age: 30
End: 2025-07-09
Payer: COMMERCIAL

## 2025-07-09 PROCEDURE — 99214 OFFICE O/P EST MOD 30 MIN: CPT | Mod: 95

## 2025-08-05 ENCOUNTER — EMERGENCY (EMERGENCY)
Facility: HOSPITAL | Age: 30
LOS: 0 days | Discharge: HOME | End: 2025-08-05
Attending: STUDENT IN AN ORGANIZED HEALTH CARE EDUCATION/TRAINING PROGRAM
Payer: COMMERCIAL

## 2025-08-05 VITALS
OXYGEN SATURATION: 100 % | DIASTOLIC BLOOD PRESSURE: 85 MMHG | SYSTOLIC BLOOD PRESSURE: 129 MMHG | HEART RATE: 92 BPM | RESPIRATION RATE: 17 BRPM | TEMPERATURE: 98 F | WEIGHT: 130.07 LBS

## 2025-08-05 VITALS
SYSTOLIC BLOOD PRESSURE: 110 MMHG | OXYGEN SATURATION: 100 % | HEART RATE: 76 BPM | TEMPERATURE: 99 F | RESPIRATION RATE: 12 BRPM | DIASTOLIC BLOOD PRESSURE: 71 MMHG
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
## 2025-08-05 DIAGNOSIS — Z98.890 OTHER SPECIFIED POSTPROCEDURAL STATES: Chronic | ICD-10-CM

## 2025-08-05 DIAGNOSIS — Z90.3 ACQUIRED ABSENCE OF STOMACH [PART OF]: Chronic | ICD-10-CM

## 2025-08-05 DIAGNOSIS — M54.2 CERVICALGIA: ICD-10-CM
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
## 2025-08-05 DIAGNOSIS — M79.604 PAIN IN RIGHT LEG: ICD-10-CM
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
## 2025-08-05 DIAGNOSIS — S00.93XA CONTUSION OF UNSPECIFIED PART OF HEAD, INITIAL ENCOUNTER: ICD-10-CM

## 2025-08-05 DIAGNOSIS — K21.9 GASTRO-ESOPHAGEAL REFLUX DISEASE WITHOUT ESOPHAGITIS: ICD-10-CM

## 2025-08-05 DIAGNOSIS — Z88.0 ALLERGY STATUS TO PENICILLIN: ICD-10-CM

## 2025-08-05 DIAGNOSIS — Z98.84 BARIATRIC SURGERY STATUS: Chronic | ICD-10-CM
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
## 2025-08-05 LAB
ALBUMIN SERPL ELPH-MCNC: 3.6 G/DL — SIGNIFICANT CHANGE UP (ref 3.3–5)
ALP SERPL-CCNC: 43 U/L — SIGNIFICANT CHANGE UP (ref 40–120)
ALT FLD-CCNC: 32 U/L — SIGNIFICANT CHANGE UP (ref 12–78)
ANION GAP SERPL CALC-SCNC: 4 MMOL/L — LOW (ref 5–17)
APTT BLD: 26.7 SEC — SIGNIFICANT CHANGE UP (ref 26.1–36.8)
AST SERPL-CCNC: 17 U/L — SIGNIFICANT CHANGE UP (ref 15–37)
BASOPHILS # BLD AUTO: 0.07 K/UL — SIGNIFICANT CHANGE UP (ref 0–0.2)
BASOPHILS NFR BLD AUTO: 1 % — SIGNIFICANT CHANGE UP (ref 0–2)
BILIRUB SERPL-MCNC: 0.2 MG/DL — SIGNIFICANT CHANGE UP (ref 0.2–1.2)
BUN SERPL-MCNC: 16 MG/DL — SIGNIFICANT CHANGE UP (ref 7–23)
CALCIUM SERPL-MCNC: 8.5 MG/DL — SIGNIFICANT CHANGE UP (ref 8.5–10.1)
CHLORIDE SERPL-SCNC: 108 MMOL/L — SIGNIFICANT CHANGE UP (ref 96–108)
CO2 SERPL-SCNC: 28 MMOL/L — SIGNIFICANT CHANGE UP (ref 22–31)
CREAT SERPL-MCNC: 0.86 MG/DL — SIGNIFICANT CHANGE UP (ref 0.5–1.3)
EGFR: 92 ML/MIN/1.73M2 — SIGNIFICANT CHANGE UP
EGFR: 92 ML/MIN/1.73M2 — SIGNIFICANT CHANGE UP
EOSINOPHIL # BLD AUTO: 0.16 K/UL — SIGNIFICANT CHANGE UP (ref 0–0.5)
EOSINOPHIL NFR BLD AUTO: 2.2 % — SIGNIFICANT CHANGE UP (ref 0–6)
ETHANOL SERPL-MCNC: <10 MG/DL — SIGNIFICANT CHANGE UP (ref 0–10)
GLUCOSE SERPL-MCNC: 73 MG/DL — SIGNIFICANT CHANGE UP (ref 70–99)
HCG SERPL-ACNC: <1 MIU/ML — SIGNIFICANT CHANGE UP
HCT VFR BLD CALC: 37.5 % — SIGNIFICANT CHANGE UP (ref 34.5–45)
HGB BLD-MCNC: 12.3 G/DL — SIGNIFICANT CHANGE UP (ref 11.5–15.5)
IMM GRANULOCYTES # BLD AUTO: 0.02 K/UL — SIGNIFICANT CHANGE UP (ref 0–0.07)
IMM GRANULOCYTES NFR BLD AUTO: 0.3 % — SIGNIFICANT CHANGE UP (ref 0–0.9)
INR BLD: 0.86 RATIO — SIGNIFICANT CHANGE UP (ref 0.85–1.16)
LACTATE SERPL-SCNC: 0.8 MMOL/L — SIGNIFICANT CHANGE UP (ref 0.7–2)
LIDOCAIN IGE QN: 31 U/L — SIGNIFICANT CHANGE UP (ref 13–75)
LYMPHOCYTES # BLD AUTO: 2.45 K/UL — SIGNIFICANT CHANGE UP (ref 1–3.3)
LYMPHOCYTES NFR BLD AUTO: 33.5 % — SIGNIFICANT CHANGE UP (ref 13–44)
MCHC RBC-ENTMCNC: 29.6 PG — SIGNIFICANT CHANGE UP (ref 27–34)
MCHC RBC-ENTMCNC: 32.8 G/DL — SIGNIFICANT CHANGE UP (ref 32–36)
MCV RBC AUTO: 90.4 FL — SIGNIFICANT CHANGE UP (ref 80–100)
MONOCYTES # BLD AUTO: 0.48 K/UL — SIGNIFICANT CHANGE UP (ref 0–0.9)
MONOCYTES NFR BLD AUTO: 6.6 % — SIGNIFICANT CHANGE UP (ref 2–14)
NEUTROPHILS # BLD AUTO: 4.14 K/UL — SIGNIFICANT CHANGE UP (ref 1.8–7.4)
NEUTROPHILS NFR BLD AUTO: 56.4 % — SIGNIFICANT CHANGE UP (ref 43–77)
NRBC # BLD AUTO: 0 K/UL — SIGNIFICANT CHANGE UP (ref 0–0)
NRBC # FLD: 0 K/UL — SIGNIFICANT CHANGE UP (ref 0–0)
NRBC BLD AUTO-RTO: 0 /100 WBCS — SIGNIFICANT CHANGE UP (ref 0–0)
PLATELET # BLD AUTO: 275 K/UL — SIGNIFICANT CHANGE UP (ref 150–400)
PMV BLD: 9 FL — SIGNIFICANT CHANGE UP (ref 7–13)
POTASSIUM SERPL-MCNC: 3.5 MMOL/L — SIGNIFICANT CHANGE UP (ref 3.5–5.3)
POTASSIUM SERPL-SCNC: 3.5 MMOL/L — SIGNIFICANT CHANGE UP (ref 3.5–5.3)
PROT SERPL-MCNC: 6.4 GM/DL — SIGNIFICANT CHANGE UP (ref 6–8.3)
PROTHROM AB SERPL-ACNC: 9.9 SEC — SIGNIFICANT CHANGE UP (ref 9.9–13.4)
RBC # BLD: 4.15 M/UL — SIGNIFICANT CHANGE UP (ref 3.8–5.2)
RBC # FLD: 11.4 % — SIGNIFICANT CHANGE UP (ref 10.3–14.5)
SODIUM SERPL-SCNC: 140 MMOL/L — SIGNIFICANT CHANGE UP (ref 135–145)
WBC # BLD: 7.32 K/UL — SIGNIFICANT CHANGE UP (ref 3.8–10.5)
WBC # FLD AUTO: 7.32 K/UL — SIGNIFICANT CHANGE UP (ref 3.8–10.5)

## 2025-08-05 PROCEDURE — 74177 CT ABD & PELVIS W/CONTRAST: CPT | Mod: 26

## 2025-08-05 PROCEDURE — 72125 CT NECK SPINE W/O DYE: CPT | Mod: 26

## 2025-08-05 PROCEDURE — 70450 CT HEAD/BRAIN W/O DYE: CPT

## 2025-08-05 PROCEDURE — 72125 CT NECK SPINE W/O DYE: CPT

## 2025-08-05 PROCEDURE — 82962 GLUCOSE BLOOD TEST: CPT

## 2025-08-05 PROCEDURE — 85610 PROTHROMBIN TIME: CPT

## 2025-08-05 PROCEDURE — 85025 COMPLETE CBC W/AUTO DIFF WBC: CPT

## 2025-08-05 PROCEDURE — 99053 MED SERV 10PM-8AM 24 HR FAC: CPT

## 2025-08-05 PROCEDURE — 83690 ASSAY OF LIPASE: CPT

## 2025-08-05 PROCEDURE — 70450 CT HEAD/BRAIN W/O DYE: CPT | Mod: 26

## 2025-08-05 PROCEDURE — 80307 DRUG TEST PRSMV CHEM ANLYZR: CPT

## 2025-08-05 PROCEDURE — 99285 EMERGENCY DEPT VISIT HI MDM: CPT

## 2025-08-05 PROCEDURE — 83605 ASSAY OF LACTIC ACID: CPT

## 2025-08-05 PROCEDURE — 71260 CT THORAX DX C+: CPT | Mod: 26

## 2025-08-05 PROCEDURE — 99284 EMERGENCY DEPT VISIT MOD MDM: CPT | Mod: 25

## 2025-08-05 PROCEDURE — 71260 CT THORAX DX C+: CPT

## 2025-08-05 PROCEDURE — 84702 CHORIONIC GONADOTROPIN TEST: CPT

## 2025-08-05 PROCEDURE — 36415 COLL VENOUS BLD VENIPUNCTURE: CPT

## 2025-08-05 PROCEDURE — 80053 COMPREHEN METABOLIC PANEL: CPT

## 2025-08-05 PROCEDURE — 74177 CT ABD & PELVIS W/CONTRAST: CPT

## 2025-08-05 PROCEDURE — 85730 THROMBOPLASTIN TIME PARTIAL: CPT

## 2025-08-05 PROCEDURE — 96374 THER/PROPH/DIAG INJ IV PUSH: CPT | Mod: XU

## 2025-08-05 RX ORDER — METHOCARBAMOL 500 MG/1
1500 TABLET, FILM COATED ORAL ONCE
Refills: 0 | Status: COMPLETED | OUTPATIENT
Start: 2025-08-05 | End: 2025-08-05

## 2025-08-05 RX ORDER — METHOCARBAMOL 500 MG/1
2 TABLET, FILM COATED ORAL
Qty: 40 | Refills: 0
Start: 2025-08-05 | End: 2025-08-09

## 2025-08-05 RX ORDER — KETOROLAC TROMETHAMINE 30 MG/ML
15 INJECTION, SOLUTION INTRAMUSCULAR; INTRAVENOUS ONCE
Refills: 0 | Status: DISCONTINUED | OUTPATIENT
Start: 2025-08-05 | End: 2025-08-05

## 2025-08-05 RX ADMIN — METHOCARBAMOL 1500 MILLIGRAM(S): 500 TABLET, FILM COATED ORAL at 01:09

## 2025-08-05 RX ADMIN — KETOROLAC TROMETHAMINE 15 MILLIGRAM(S): 30 INJECTION, SOLUTION INTRAMUSCULAR; INTRAVENOUS at 01:10

## 2025-08-21 ENCOUNTER — TRANSCRIPTION ENCOUNTER (OUTPATIENT)
Age: 30
End: 2025-08-21

## (undated) DEVICE — SYR 60CC G MANOM

## (undated) DEVICE — GLV 6.5 PROTEXIS

## (undated) DEVICE — SYR LUER LOK 10CC

## (undated) DEVICE — SUT POLYSORB 0 30" GU-46

## (undated) DEVICE — ELCTR EDGE BOVIE INSULATED BLADE TIP 2.75"

## (undated) DEVICE — SHEARS HARMONIC ACE PLUS 7 5MM X 36CM CURVED TIP

## (undated) DEVICE — BLANKET WARMER UPPER ADULT

## (undated) DEVICE — Q TIP 6"

## (undated) DEVICE — ENDOCATCH 10MM SPECIMEN POUCH

## (undated) DEVICE — CATH ELECHMSTAT  INJ 7FR 210CM

## (undated) DEVICE — WRAP COMPRESSION CALF MED

## (undated) DEVICE — Device

## (undated) DEVICE — SNARE OVAL LOOP MICOR

## (undated) DEVICE — PROBE FIAPC CIRC O.D. 2.3MM/6.9FR LNTH 220CM/7.2FT

## (undated) DEVICE — TROCAR ETHICON XCEL UNIVERSAL SLEEVE W OPTIVIEW 5MM-100MM

## (undated) DEVICE — ELCTR NESSY OMEGA RETURN 85CM W/4M CBL

## (undated) DEVICE — TUBING CAP SET ENDO 24HR USE GI

## (undated) DEVICE — SOL IRR POUR H2O 1000ML

## (undated) DEVICE — BLANKET WARMER FULL ADULT

## (undated) DEVICE — TUBING STRYKEFLOW II SUCTION / IRRIGATOR

## (undated) DEVICE — PROBE FIAPC DIA 2.3MM/7FR LNTH 220CM/7.2FT

## (undated) DEVICE — TROCAR ETHICON ENDOPATH XCEL BLADELESS 5MM-100MM

## (undated) DEVICE — SYR CATH TIP 2 OZ

## (undated) DEVICE — GLV 8 PROTEXIS

## (undated) DEVICE — DRAPE TOWEL BLUE 17" X 24"

## (undated) DEVICE — SOL IRR BAG NS 0.9% 3000ML

## (undated) DEVICE — LIGASURE MARYLAND JAW LAPAROSCOPIC SEALER 37CM

## (undated) DEVICE — TROCAR COVIDIEN ENDO CLOSE SUTURING DEVICE

## (undated) DEVICE — NDL INJ SCLERO INTERJECT 25G

## (undated) DEVICE — DRSG 4X4

## (undated) DEVICE — D HELP - CLEARVIEW CLEARIFY SYSTEM

## (undated) DEVICE — SPONGE ENDO PEANUT 5MM

## (undated) DEVICE — TUBING SUCTION 20FT

## (undated) DEVICE — TROCAR ETHICON 11MM XCEL BLADELESS

## (undated) DEVICE — TUBING SUCTION CONN 6FT STERILE

## (undated) DEVICE — GOWN ISOLATION WHITE

## (undated) DEVICE — TUBING STRYKER PNEUMOSURE HI FLOW RTP

## (undated) DEVICE — SUCTION YANKAUER OPEN TIP NO VENT CURVE

## (undated) DEVICE — RETRIEVER ROTH NET PLATINUM-UNIVERSAL

## (undated) DEVICE — TRAP SUCTION POLYP ENDODYNAMIC

## (undated) DEVICE — SOL IRR POUR NS 0.9% 1000ML

## (undated) DEVICE — PACK GENERAL LAPAROSCOPY

## (undated) DEVICE — DRAPE 3/4 SHEET 52X76"

## (undated) DEVICE — BITE BLOCK SCOPE SAVER 20X27MM ADULT GREEN

## (undated) DEVICE — BLADE SURGICAL #15 CARBON

## (undated) DEVICE — SUT POLYSORB 3-0 30" V-20 UNDYED